# Patient Record
Sex: FEMALE | Race: WHITE
[De-identification: names, ages, dates, MRNs, and addresses within clinical notes are randomized per-mention and may not be internally consistent; named-entity substitution may affect disease eponyms.]

---

## 2017-03-30 ENCOUNTER — HOSPITAL ENCOUNTER (EMERGENCY)
Dept: HOSPITAL 10 - E/R | Age: 77
Discharge: HOME | End: 2017-03-30
Payer: MEDICARE

## 2017-03-30 VITALS
HEART RATE: 65 BPM | SYSTOLIC BLOOD PRESSURE: 222 MMHG | TEMPERATURE: 98.3 F | RESPIRATION RATE: 20 BRPM | DIASTOLIC BLOOD PRESSURE: 98 MMHG

## 2017-03-30 VITALS — HEIGHT: 55 IN | WEIGHT: 199.74 LBS | BODY MASS INDEX: 46.22 KG/M2

## 2017-03-30 DIAGNOSIS — R10.30: Primary | ICD-10-CM

## 2017-03-30 DIAGNOSIS — Z79.4: ICD-10-CM

## 2017-03-30 DIAGNOSIS — I10: ICD-10-CM

## 2017-03-30 DIAGNOSIS — E11.9: ICD-10-CM

## 2017-03-30 DIAGNOSIS — Z79.84: ICD-10-CM

## 2017-03-30 LAB
ADD SCAN DIFF: NO
ADD UMIC: NO
ALBUMIN SERPL-MCNC: 3.8 G/DL (ref 3.3–4.9)
ALBUMIN/GLOB SERPL: 1.11 {RATIO}
ALP SERPL-CCNC: 119 IU/L (ref 42–121)
ALT SERPL-CCNC: 23 IU/L (ref 13–69)
ANION GAP SERPL CALC-SCNC: 13 MMOL/L (ref 8–16)
AST SERPL-CCNC: 25 IU/L (ref 15–46)
BASOPHILS # BLD AUTO: 0 10^3/UL (ref 0–0.1)
BASOPHILS NFR BLD: 0.4 % (ref 0–2)
BILIRUB DIRECT SERPL-MCNC: 0 MG/DL (ref 0–0.2)
BILIRUB SERPL-MCNC: 0.2 MG/DL (ref 0.2–1.3)
BUN SERPL-MCNC: 16 MG/DL (ref 7–20)
CALCIUM SERPL-MCNC: 10.4 MG/DL (ref 8.4–10.2)
CHLORIDE SERPL-SCNC: 104 MMOL/L (ref 97–110)
CO2 SERPL-SCNC: 28 MMOL/L (ref 21–31)
COLOR UR: (no result)
CREAT SERPL-MCNC: 0.87 MG/DL (ref 0.44–1)
EOSINOPHIL # BLD: 0.2 10^3/UL (ref 0–0.5)
EOSINOPHIL NFR BLD: 2.8 % (ref 0–7)
ERYTHROCYTE [DISTWIDTH] IN BLOOD BY AUTOMATED COUNT: 14.3 % (ref 11.5–14.5)
GLOBULIN SER-MCNC: 3.4 G/DL (ref 1.3–3.2)
GLUCOSE SERPL-MCNC: 173 MG/DL (ref 70–220)
GLUCOSE UR STRIP-MCNC: NEGATIVE %
HCT VFR BLD CALC: 39.5 % (ref 37–47)
HGB BLD-MCNC: 13.2 G/DL (ref 12–16)
KETONES UR STRIP.AUTO-MCNC: NEGATIVE MG/DL
LYMPHOCYTES # BLD AUTO: 2.6 10^3/UL (ref 0.8–2.9)
LYMPHOCYTES NFR BLD AUTO: 33.7 % (ref 15–51)
MCH RBC QN AUTO: 31.3 PG (ref 29–33)
MCHC RBC AUTO-ENTMCNC: 33.4 G/DL (ref 32–37)
MCV RBC AUTO: 93.6 FL (ref 82–101)
MONOCYTES # BLD: 0.4 10^3/UL (ref 0.3–0.9)
MONOCYTES NFR BLD: 4.8 % (ref 0–11)
NEUTROPHILS # BLD: 4.5 10^3/UL (ref 1.6–7.5)
NEUTROPHILS NFR BLD AUTO: 57.8 % (ref 39–77)
NITRITE UR QL STRIP.AUTO: NEGATIVE
NRBC # BLD MANUAL: 0 10^3/UL (ref 0–0)
NRBC BLD QL: 0 /100WBC (ref 0–0)
PLATELET # BLD: 201 10^3/UL (ref 140–415)
PMV BLD AUTO: 10.2 FL (ref 7.4–10.4)
POTASSIUM SERPL-SCNC: 4.2 MMOL/L (ref 3.5–5.1)
PROT SERPL-MCNC: 7.2 G/DL (ref 6.1–8.1)
RBC # BLD AUTO: 4.22 10^6/UL (ref 4.2–5.4)
RBC # UR AUTO: NEGATIVE /UL
SODIUM SERPL-SCNC: 141 MMOL/L (ref 135–144)
URINE BILIRUBIN (DIP): NEGATIVE
URINE TOTAL PROTEIN (DIP): NEGATIVE
UROBILINOGEN UR STRIP-ACNC: (no result) (ref 0.1–1)
WBC # BLD AUTO: 7.7 10^3/UL (ref 4.8–10.8)
WBC # UR STRIP: NEGATIVE /UL

## 2017-03-30 PROCEDURE — 36415 COLL VENOUS BLD VENIPUNCTURE: CPT

## 2017-03-30 PROCEDURE — 80053 COMPREHEN METABOLIC PANEL: CPT

## 2017-03-30 PROCEDURE — 83690 ASSAY OF LIPASE: CPT

## 2017-03-30 PROCEDURE — 99284 EMERGENCY DEPT VISIT MOD MDM: CPT

## 2017-03-30 PROCEDURE — 81003 URINALYSIS AUTO W/O SCOPE: CPT

## 2017-03-30 PROCEDURE — 85025 COMPLETE CBC W/AUTO DIFF WBC: CPT

## 2017-03-30 PROCEDURE — 96374 THER/PROPH/DIAG INJ IV PUSH: CPT

## 2017-03-30 NOTE — ERD
ER Documentation


Chief Complaint


Date/Time


DATE: 3/30/17 


TIME: 10:59


Chief Complaint


LEFT FLANK PAIN LOWER ABD PAIN FOR A FEW DAYS. NO VOMITING. DYSURIA AT TIME





HPI


76-year-old female referred to the emergency department for abdominal pain that 

she has had for years now.  Her physician apparently checked a urine and was 

concerned that she might have a urinary tract infection and therefore referred 

the patient to the emergency department.  Patient reports the pain is 

nonspecific non-provoked and poorly localized.  Patient has no fevers, chills, 

vomiting, diarrhea.  Patient has no hematuria or dysuria but reports an itching 

inside her vagina for many years.





ROS


All systems reviewed and are negative except as per history of present illness.





Medications


Home Meds


Active Scripts


Fluconazole* (Diflucan*) 150 Mg Tablet, 150 MG PO ONCE, #1 TAB


   Prov:GIOVANI SOMMER         3/30/17


Reported Medications


Insulin Glargine* (Lantus*) 100 Unit/Ml Soln, 30 UNIT SC QPM Y for 0, #1 VIAL


   3/30/17


Insulin Glargine* (Lantus*) 100 Unit/Ml Soln, 45 UNIT SC QAM, #1 VIAL


   3/30/17


Zolpidem Tartrate* (Zolpidem Tartrate*) 5 Mg Tablet, 10 MG PO HS Y, TAB


   10/16/14


Ca Carbonate/Vitamin D3/Vit K (VIACTIV SOFT CHEW TABLET) 1 Each Tab.chew, 1 

EACH PO WITH MEALS, TAB.CHEW


   9/21/14


Esomeprazole Mag Trihydrate (Nexium) 40 Mg Capsule.dr, 40 MG PO DAILY, CAP


   9/21/14


Cholecalciferol (Vitamin D3) (VITAMIN D-3) 2,000 Unit Capsule, 2000 UNIT PO 

DAILY


   9/21/14


Amlodipine-Valsartan-HCTZ (Exforge HCT) -12.5 Mg Tab, 1 TAB PO DAILY, TAB


   9/21/14


Metoprolol Succinate* (Toprol XL*) 50 Mg Tab.er.24h, 50 MG PO DAILY, TAB


   9/21/14


Discontinued Reported Medications


Insulin Glargine* (Lantus*) 100 Unit/Ml Soln, 1 UNIT SC QHS, #1 VIAL


   9/11/16


Insulin Glargine* (Lantus*) 100 Unit/Ml Soln, 40 UNIT SC DAILY, #1 VIAL


   9/11/16


Hydrocodone Bit-Acetaminophen* (Norco*)  Mg Tablet, 1 TAB PO BID Y for 

PAIN, TAB


   9/21/14


Celecoxib* (Celebrex*) 200 Mg Capsule, 200 MG PO DAILY, CAP


   9/21/14


Gabapentin* (Gabapentin*) 300 Mg Capsule, 300 MG PO BID, CAP


   9/21/14


Sitagliptin* (Januvia*) 100 Mg Tablet, 100 MG PO DAILY, TAB


   9/21/14


Discontinued Scripts


Metoclopramide Hcl* (Metoclopramide Hcl*) 10 Mg Tablet, 10 MG PO Q6H Y for 

NAUSEA AND OR VOMITING, #20 TAB


   Prov:ESPERANZA LEMA MD         9/11/16


Sennosides* (Senna Lax*) 8.6 Mg Tablet, 1 TAB PO Q12H Y for CONSTIPATION, #10 

TAB


   Prov:ESPERANZA LEMA MD         9/11/16


Ciprofloxacin Hcl* (Ciprofloxacin Hcl*) 500 Mg Tablet, 500 MG PO BID for 10 Days

, TAB


   Prov:FRANCA ALVARADO DO         7/23/16


Tramadol HCl (Tramadol HCl) 50 Mg Tab, 50 MG PO Q6 Y for PAIN, #12 TAB


   Prov:DONALD HATHAWAY MD         6/28/15


Levofloxacin* (Levaquin*) 500 Mg Tab, 500 MG PO DAILY@06 for 3 Days


   Prov:DANTE VILLA NP         10/1/14





Allergies


Allergies:  


Coded Allergies:  


     No Known Allergies (Verified  Allergy, Mild, 6/28/15)





PMhx/Soc


History of Surgery:  No


Anesthesia Reaction:  No


Hx Neurological Disorder:  No


Hx Respiratory Disorders:  No


Hx Cardiac Disorders:  Yes (HTN )


Hx Psychiatric Problems:  No


Hx Miscellaneous Medical Probl:  Yes (DM, HIGH CHOLESTEROL )


Hx Alcohol Use:  No


Hx Substance Use:  No


Hx Tobacco Use:  No





FmHx


Noncontributory for chief complaint





Physical Exam


Vitals





Vital Signs








  Date Time  Temp Pulse Resp B/P Pulse Ox O2 Delivery O2 Flow Rate FiO2


 


3/30/17 09:27 98.8 67 20 184/77 97   








Physical Exam


GENERAL: The patient is well developed and appropriate for usual state of 

health in no apparent distress


HEENT: Pupils equal, round, and reactive to light.  EOMI. There is no scleral 

icterus.


NECK: C-spine is soft and supple, there is no meningismus.  There is no 

cervical lymphadenopathy.


LUNGS: Clear to auscultation bilaterally. There are no rales, wheezes or 

rhonchi.


HEART: Regular rate and rhythm, no murmurs, clicks, rubs or gallops.


ABDOMEN: Soft, non-tender, non-distended.  There are bowel sounds in all four 

quadrants. No rebound or guarding.


EXTREMITIES: There is no peripheral cyanosis or edema.  No focal swelling or 

erythema.


NEURO: The patient moves all four extremities with 5/5 strength.  Cranial 

nerves II - XII are intact. Normal gait. Alert and oriented


SKIN: There is no apparent rash or petechiae.


HEME/LYMPHATIC: There is no evidence of excessive bruising or lymphedema.


PSYCHIATRIC: The patient does not appear anxious or depressed.


Result Diagram:  


3/30/17 0950                                                                   

             3/30/17 0950





Results 24 hrs





 Laboratory Tests








Test


  3/30/17


09:30 3/30/17


09:50


 


Urine Color LT. YELLOW  


 


Urine Clarity CLEAR  


 


Urine pH 6.0  


 


Urine Specific Gravity 1.020  


 


Urine Ketones NEGATIVE  


 


Urine Nitrite NEGATIVE  


 


Urine Bilirubin NEGATIVE  


 


Urine Urobilinogen 0.2  E.U./dL  


 


Urine Leukocyte Esterase NEGATIVE  


 


Urine Hemoglobin NEGATIVE  


 


Urine Glucose NEGATIVE%  


 


Urine Total Protein NEGATIVE  


 


White Blood Count  7.710^3/ul 


 


Red Blood Count  4.2210^6/ul 


 


Hemoglobin  13.2g/dl 


 


Hematocrit  39.5% 


 


Mean Corpuscular Volume  93.6fl 


 


Mean Corpuscular Hemoglobin  31.3pg 


 


Mean Corpuscular Hemoglobin


Concent 


  33.4g/dl 


 


 


Red Cell Distribution Width  14.3% 


 


Platelet Count  77446^3/UL 


 


Mean Platelet Volume  10.2fl 


 


Neutrophils %  57.8% 


 


Lymphocytes %  33.7% 


 


Monocytes %  4.8% 


 


Eosinophils %  2.8% 


 


Basophils %  0.4% 


 


Nucleated Red Blood Cells %  0.0/100WBC 


 


Neutrophils #  4.510^3/ul 


 


Lymphocytes #  2.610^3/ul 


 


Monocytes #  0.410^3/ul 


 


Eosinophils #  0.210^3/ul 


 


Basophils #  0.010^3/ul 


 


Nucleated Red Blood Cells #  0.010^3/ul 


 


Sodium Level  141mmol/L 


 


Potassium Level  4.2mmol/L 


 


Chloride Level  104mmol/L 


 


Carbon Dioxide Level  28mmol/L 


 


Anion Gap  13 


 


Blood Urea Nitrogen  16mg/dl 


 


Creatinine  0.87mg/dl 


 


Glucose Level  173mg/dl 


 


Calcium Level  10.4mg/dl 


 


Total Bilirubin  0.2mg/dl 


 


Direct Bilirubin  0.00mg/dl 


 


Indirect Bilirubin  0.2mg/dl 


 


Aspartate Amino Transf


(AST/SGOT) 


  25IU/L 


 


 


Alanine Aminotransferase


(ALT/SGPT) 


  23IU/L 


 


 


Alkaline Phosphatase  119IU/L 


 


Total Protein  7.2g/dl 


 


Albumin  3.8g/dl 


 


Globulin  3.40g/dl 


 


Albumin/Globulin Ratio  1.11 


 


Lipase  121U/L 








 Current Medications








 Medications


  (Trade)  Dose


 Ordered  Sig/Ac


 Route


 PRN Reason  Start Time


 Stop Time Status Last Admin


Dose Admin


 


 Sodium Chloride  500 ml @ 


 500 mls/hr  Q1H STAT


 IV


   3/30/17 09:38


 3/30/17 10:37 DC 3/30/17 10:06


 


 


 Ceftriaxone Sodium


  (Rocephin)  50 ml @ 


 100 mls/hr  ONCE  STAT


 IVPB


   3/30/17 09:38


 3/30/17 10:07 DC 3/30/17 10:06


 











Procedures/MDM


Patient was taken to a room, seen and evaluated. Comfort measures were 

initiated. 





Diagnostic tests were ordered and reviewed.





REEVALUATION: Patient remained comfortable in the emergency room





MEDICAL DECISION MAKING: Patient presents with abdominal pain of uncertain 

etiology. Differential diagnosis considered includes appendicitis, 

diverticulitis, cholecystitis and other intra-abdominal medical and surgical 

concerns. I have reviewed the patients lab studies as well as multiple 

examinations of the abdomen. 


At this time, patient shows no evidence of high risk infection in her urine, 

her he would sugar is well controlled, she has no evidence of appendicitis or 

other high-risk issues.  I suspect the burning is likely a low-grade vaginitis 

which I will treat with fluconazole, but she is otherwise clinically well and 

appropriate for outpatient care.





Departure


Diagnosis:  


 Primary Impression:  


 Abdominal pain


Condition:  Stable


Patient Instructions:  Abdominal Pain


Referrals:  


JERRY GOMEZ (PCP)





Additional Instructions:  


See your doctor for follow-up as discussed.  Take a copy of your test results, 

if appropriate, to this follow-up visit.





See your doctor or return here if your symptoms do not improve as expected.





At any time, please return to the emergency department for any change or 

worsening in her symptoms.











GIOVANI SOMMER Mar 30, 2017 11:01

## 2017-05-21 ENCOUNTER — HOSPITAL ENCOUNTER (EMERGENCY)
Dept: HOSPITAL 10 - E/R | Age: 77
Discharge: HOME | End: 2017-05-21
Payer: MEDICARE

## 2017-05-21 VITALS
HEART RATE: 102 BPM | DIASTOLIC BLOOD PRESSURE: 76 MMHG | RESPIRATION RATE: 18 BRPM | TEMPERATURE: 98.7 F | SYSTOLIC BLOOD PRESSURE: 174 MMHG

## 2017-05-21 VITALS
WEIGHT: 242.51 LBS | HEIGHT: 62 IN | BODY MASS INDEX: 44.63 KG/M2 | WEIGHT: 242.51 LBS | HEIGHT: 62 IN | BODY MASS INDEX: 44.63 KG/M2

## 2017-05-21 DIAGNOSIS — Z79.4: ICD-10-CM

## 2017-05-21 DIAGNOSIS — E11.9: ICD-10-CM

## 2017-05-21 DIAGNOSIS — R10.84: Primary | ICD-10-CM

## 2017-05-21 DIAGNOSIS — I10: ICD-10-CM

## 2017-05-21 DIAGNOSIS — R11.11: ICD-10-CM

## 2017-05-21 DIAGNOSIS — R93.0: ICD-10-CM

## 2017-05-21 LAB
ADD SCAN DIFF: NO
ADD UMIC: NO
ALBUMIN SERPL-MCNC: 4.1 G/DL (ref 3.3–4.9)
ALBUMIN/GLOB SERPL: 1.02 {RATIO}
ALP SERPL-CCNC: 127 IU/L (ref 42–121)
ALT SERPL-CCNC: 38 IU/L (ref 13–69)
ANION GAP SERPL CALC-SCNC: 16 MMOL/L (ref 8–16)
APTT BLD: 33.4 SEC (ref 25–35)
AST SERPL-CCNC: 27 IU/L (ref 15–46)
BASOPHILS # BLD AUTO: 0 10^3/UL (ref 0–0.1)
BASOPHILS NFR BLD: 0.3 % (ref 0–2)
BILIRUB DIRECT SERPL-MCNC: 0 MG/DL (ref 0–0.2)
BILIRUB SERPL-MCNC: 0.1 MG/DL (ref 0.2–1.3)
BUN SERPL-MCNC: 17 MG/DL (ref 7–20)
CALCIUM SERPL-MCNC: 10.9 MG/DL (ref 8.4–10.2)
CHLORIDE SERPL-SCNC: 102 MMOL/L (ref 97–110)
CO2 SERPL-SCNC: 27 MMOL/L (ref 21–31)
COLOR UR: (no result)
CREAT SERPL-MCNC: 0.82 MG/DL (ref 0.44–1)
EOSINOPHIL # BLD: 0.2 10^3/UL (ref 0–0.5)
EOSINOPHIL NFR BLD: 1.8 % (ref 0–7)
ERYTHROCYTE [DISTWIDTH] IN BLOOD BY AUTOMATED COUNT: 13.6 % (ref 11.5–14.5)
GLOBULIN SER-MCNC: 4 G/DL (ref 1.3–3.2)
GLUCOSE SERPL-MCNC: 81 MG/DL (ref 70–220)
GLUCOSE UR STRIP-MCNC: NEGATIVE %
HCT VFR BLD CALC: 42.2 % (ref 37–47)
HGB BLD-MCNC: 13.9 G/DL (ref 12–16)
INR PPP: 0.91
KETONES UR STRIP.AUTO-MCNC: NEGATIVE MG/DL
LYMPHOCYTES # BLD AUTO: 2.6 10^3/UL (ref 0.8–2.9)
LYMPHOCYTES NFR BLD AUTO: 27.3 % (ref 15–51)
MCH RBC QN AUTO: 30.6 PG (ref 29–33)
MCHC RBC AUTO-ENTMCNC: 32.9 G/DL (ref 32–37)
MCV RBC AUTO: 93 FL (ref 82–101)
MONOCYTES # BLD: 0.5 10^3/UL (ref 0.3–0.9)
MONOCYTES NFR BLD: 4.9 % (ref 0–11)
NEUTROPHILS # BLD: 6.3 10^3/UL (ref 1.6–7.5)
NEUTROPHILS NFR BLD AUTO: 65.4 % (ref 39–77)
NITRITE UR QL STRIP.AUTO: NEGATIVE
NRBC # BLD MANUAL: 0 10^3/UL (ref 0–0)
NRBC BLD QL: 0 /100WBC (ref 0–0)
PLATELET # BLD: 232 10^3/UL (ref 140–415)
PMV BLD AUTO: 9.9 FL (ref 7.4–10.4)
POTASSIUM SERPL-SCNC: 3.8 MMOL/L (ref 3.5–5.1)
PROT SERPL-MCNC: 8.1 G/DL (ref 6.1–8.1)
PROTHROMBIN TIME: 12.3 SEC (ref 12.2–14.2)
PT RATIO: 1
RBC # BLD AUTO: 4.54 10^6/UL (ref 4.2–5.4)
RBC # UR AUTO: NEGATIVE /UL
SODIUM SERPL-SCNC: 141 MMOL/L (ref 135–144)
TROPONIN I SERPL-MCNC: < 0.012 NG/ML (ref 0–0.12)
URINE BILIRUBIN (DIP): NEGATIVE
URINE TOTAL PROTEIN (DIP): NEGATIVE
UROBILINOGEN UR STRIP-ACNC: (no result) (ref 0.1–1)
WBC # BLD AUTO: 9.6 10^3/UL (ref 4.8–10.8)
WBC # UR STRIP: NEGATIVE /UL

## 2017-05-21 PROCEDURE — 80053 COMPREHEN METABOLIC PANEL: CPT

## 2017-05-21 PROCEDURE — 84484 ASSAY OF TROPONIN QUANT: CPT

## 2017-05-21 PROCEDURE — 93005 ELECTROCARDIOGRAM TRACING: CPT

## 2017-05-21 PROCEDURE — 71010: CPT

## 2017-05-21 PROCEDURE — 36415 COLL VENOUS BLD VENIPUNCTURE: CPT

## 2017-05-21 PROCEDURE — 96376 TX/PRO/DX INJ SAME DRUG ADON: CPT

## 2017-05-21 PROCEDURE — 96375 TX/PRO/DX INJ NEW DRUG ADDON: CPT

## 2017-05-21 PROCEDURE — 70450 CT HEAD/BRAIN W/O DYE: CPT

## 2017-05-21 PROCEDURE — 81003 URINALYSIS AUTO W/O SCOPE: CPT

## 2017-05-21 PROCEDURE — 85025 COMPLETE CBC W/AUTO DIFF WBC: CPT

## 2017-05-21 PROCEDURE — 85730 THROMBOPLASTIN TIME PARTIAL: CPT

## 2017-05-21 PROCEDURE — 85610 PROTHROMBIN TIME: CPT

## 2017-05-21 PROCEDURE — 74176 CT ABD & PELVIS W/O CONTRAST: CPT

## 2017-05-21 PROCEDURE — 96374 THER/PROPH/DIAG INJ IV PUSH: CPT

## 2017-05-21 PROCEDURE — 83690 ASSAY OF LIPASE: CPT

## 2017-05-21 NOTE — RADRPT
PROCEDURE:   CT Brain without contrast. 

 

CLINICAL INDICATION:   Hypertension.  Nausea.  Vomiting.. 

 

TECHNIQUE:   A CT of the brain was performed on a multislice detector CT scanner utilizing axial sec
tions from the skull base through the vertex without contrast. Images were reviewed on a high-[facility]
[employer] PACS workstation.  Exam CTDlvol = 44 mGy and DLP = 630 mGy-cm.  One of the following 3 dose red
uction techniques were used: Automated exposure control; adjustment of the mA and/or kV according to
 patient size; or use of iterative reconstruction technique.

 

COMPARISON:   None available  

 

FINDINGS:

 

There are an old right occipital and left frontal subcortical infarct.  There is age appropriate rohan
tral and peripheral atrophy.  There is no midline shift.  There is a moderate degree of supratentori
al periventricular and subcortical white matter hypodensities.  There is no definite acute stroke.  
There is no mass lesion.  There is no intracranial hemorrhage or abnormal extra-axial fluid collecti
on.  Visualized paranasal sinuses are clear. 

 

IMPRESSION:

 

1.  No acute intracranial abnormality.

2.  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

3.  Nonspecific white matter changes most commonly seen with microvascular ischemic disease.

 

 

RPTAT:  HMVK

_____________________________________________ 

.Oumar Naranjo MD, MD           Date    Time 

Electronically viewed and signed by .Oumar Naranjo MD, MD on 05/21/2017 21:05 

 

D:  05/21/2017 21:05  T:  05/21/2017 21:05

.K/

## 2017-05-21 NOTE — RADRPT
PROCEDURE:   XR, Chest. 

 

CLINICAL INDICATION:   Cough/abdomen pain. 

 

TECHNIQUE:   AP chest 

 

COMPARISON:   Chest, 09/11/2016.

 

FINDINGS:

 

There is no acute infiltrate in the lungs. There is calcified atherosclerosis of the aortic arch. No
 pleural effusion.  The heart is not enlarged. 

 

IMPRESSION:

 

1.  Unremarkable chest x-ray.

2.  Calcified atherosclerosis of the aortic arch.

 

RPTAT: GG

_____________________________________________ 

.Sam Matos MD, MD           Date    Time 

Electronically viewed and signed by .Sam Matos MD, MD on 05/21/2017 17:17 

 

D:  05/21/2017 17:17  T:  05/21/2017 17:17

.Y/

## 2017-05-21 NOTE — ERA
ER Documentation


Chief Complaint


Date/Time


DATE: 5/21/17 


TIME: 17:53


Chief Complaint


vomiting today





HPI


76-year-old female history of chronic abdominal pain who presents with nausea 

and vomiting.  24 hours of nausea vomiting  that is nonbloody nonbilious.  She 

also describes mild diffuse cramping abdominal pain.  She states no diarrhea, 

she is having regular bowel movements.  No fevers or chills.  Abdominal pain 

feels somewhat similar to abdominal pain in the past.





ROS


All systems reviewed and are negative except as per history of present illness.





Medications


Home Meds


Active Scripts


Ondansetron (Ondansetron Odt) 4 Mg Tab.rapdis, 4 MG PO Q6H Y for NAUSEA AND/OR 

VOMITING, #30 TAB


   Prov:WOLFGANG BARROSO MD         5/21/17


Fluconazole* (Diflucan*) 150 Mg Tablet, 150 MG PO ONCE, #1 TAB


   Prov:GIOVANI SOMMER         3/30/17


Reported Medications


Exenatide Microspheres (Bydureon Pen) 2 Mg/0.65 Ml Pen.injctr, 2 MG SQ Q7D for 

ON Saturday., EACH


   5/21/17


Insulin Glargine* (Lantus*) 100 Unit/Ml Soln, 30 UNIT SC QPM Y for 0, #1 VIAL


   3/30/17


Insulin Glargine* (Lantus*) 100 Unit/Ml Soln, 45 UNIT SC QAM, #1 VIAL


   3/30/17


Zolpidem Tartrate* (Zolpidem Tartrate*) 5 Mg Tablet, 10 MG PO HS Y, TAB


   10/16/14


Ca Carbonate/Vitamin D3/Vit K (VIACTIV SOFT CHEW TABLET) 1 Each Tab.chew, 1 

EACH PO WITH MEALS, TAB.CHEW


   9/21/14


Esomeprazole Mag Trihydrate (Nexium) 40 Mg Capsule.dr, 40 MG PO DAILY, CAP


   9/21/14


Cholecalciferol (Vitamin D3) (VITAMIN D-3) 2,000 Unit Capsule, 2000 UNIT PO 

DAILY


   9/21/14


Amlodipine-Valsartan-HCTZ (Exforge HCT) -12.5 Mg Tab, 1 TAB PO DAILY, TAB


   9/21/14


Metoprolol Succinate* (Toprol XL*) 50 Mg Tab.er.24h, 50 MG PO DAILY, TAB


   9/21/14





Allergies


Allergies:  


Coded Allergies:  


     No Known Allergies (Verified  Allergy, Mild, 6/28/15)





PMhx/Soc


History of Surgery:  No


Anesthesia Reaction:  No


Hx Neurological Disorder:  No


Hx Respiratory Disorders:  No


Hx Cardiac Disorders:  Yes (HTN )


Hx Psychiatric Problems:  No


Hx Miscellaneous Medical Probl:  Yes (DM, HIGH CHOLESTEROL )


Hx Alcohol Use:  No


Hx Substance Use:  No


Hx Tobacco Use:  No


Smoking Status:  Never smoker





FmHx


Family History:  No diabetes





Physical Exam


Vitals





Vital Signs








  Date Time  Temp Pulse Resp B/P Pulse Ox O2 Delivery O2 Flow Rate FiO2


 


5/21/17 16:08 99.4 100 18 246/111 99   








Physical Exam


General: Well developed, well nourished, no acute distress


Head: Normocephalic, atraumatic


Eyes: Pupils equally reactive, EOM intact


ENT: Moist mucous membranes


Neck: Supple, no lymphadenopathy


Respiratory: Lungs clear bilaterally, no distress


Cardiovascular: RRR, no murmurs, rubs, or gallops


Abdominal: Soft, mild diffuse tenderness without rebound or guarding


: Deferred


MSK: No edema, no unilateral swelling, 5/5 strength


Neurologic: Alert and oriented, moving all extremities, normal speech, no focal 

weakness, no cerebellar signs


Skin: No rash


Psych: Normal mood


Result Diagram:  


5/21/17 1736                                                                   

             5/21/17 1736





Results 24 hrs





 Laboratory Tests








Test


  5/21/17


17:36 5/21/17


17:56


 


White Blood Count 9.610^3/ul  


 


Red Blood Count 4.5410^6/ul  


 


Hemoglobin 13.9g/dl  


 


Hematocrit 42.2%  


 


Mean Corpuscular Volume 93.0fl  


 


Mean Corpuscular Hemoglobin 30.6pg  


 


Mean Corpuscular Hemoglobin


Concent 32.9g/dl 


  


 


 


Red Cell Distribution Width 13.6%  


 


Platelet Count 33569^3/UL  


 


Mean Platelet Volume 9.9fl  


 


Neutrophils % 65.4%  


 


Lymphocytes % 27.3%  


 


Monocytes % 4.9%  


 


Eosinophils % 1.8%  


 


Basophils % 0.3%  


 


Nucleated Red Blood Cells % 0.0/100WBC  


 


Neutrophils # 6.310^3/ul  


 


Lymphocytes # 2.610^3/ul  


 


Monocytes # 0.510^3/ul  


 


Eosinophils # 0.210^3/ul  


 


Basophils # 0.010^3/ul  


 


Nucleated Red Blood Cells # 0.010^3/ul  


 


Prothrombin Time 12.3Sec  


 


Prothrombin Time Ratio 1.0  


 


INR International Normalized


Ratio 0.91 


  


 


 


Activated Partial


Thromboplast Time 33.4Sec 


  


 


 


Sodium Level 141mmol/L  


 


Potassium Level 3.8mmol/L  


 


Chloride Level 102mmol/L  


 


Carbon Dioxide Level 27mmol/L  


 


Anion Gap 16  


 


Blood Urea Nitrogen 17mg/dl  


 


Creatinine 0.82mg/dl  


 


Glucose Level 81mg/dl  


 


Calcium Level 10.9mg/dl  


 


Total Bilirubin 0.1mg/dl  


 


Direct Bilirubin 0.00mg/dl  


 


Indirect Bilirubin 0.1mg/dl  


 


Aspartate Amino Transf


(AST/SGOT) 27IU/L 


  


 


 


Alanine Aminotransferase


(ALT/SGPT) 38IU/L 


  


 


 


Alkaline Phosphatase 127IU/L  


 


Troponin I < 0.012ng/ml  


 


Total Protein 8.1g/dl  


 


Albumin 4.1g/dl  


 


Globulin 4.00g/dl  


 


Albumin/Globulin Ratio 1.02  


 


Lipase 59U/L  


 


Urine Color  LT. YELLOW 


 


Urine Clarity  CLEAR 


 


Urine pH  7.0 


 


Urine Specific Gravity  <=1.005 


 


Urine Ketones  NEGATIVE 


 


Urine Nitrite  NEGATIVE 


 


Urine Bilirubin  NEGATIVE 


 


Urine Urobilinogen  0.2  E.U./dL 


 


Urine Leukocyte Esterase  NEGATIVE 


 


Urine Hemoglobin  NEGATIVE 


 


Urine Glucose  NEGATIVE% 


 


Urine Total Protein  NEGATIVE 








 Current Medications








 Medications


  (Trade)  Dose


 Ordered  Sig/Ac


 Route


 PRN Reason  Start Time


 Stop Time Status Last Admin


Dose Admin


 


 Sodium Chloride


  (NS)  1,000 ml @ 


 1,000 mls/hr  Q1H STAT


 IV


   5/21/17 16:30


 5/21/17 17:29 DC 5/21/17 17:49


 


 


 Morphine Sulfate


  (morphine)  4 mg  ONCE  STAT


 IV


   5/21/17 16:30


 5/21/17 16:31 DC 5/21/17 17:49


 


 


 Ondansetron HCl


  (Zofran Inj)  4 mg  ONCE  STAT


 IV


   5/21/17 16:30


 5/21/17 16:31 DC 5/21/17 17:49


 











Procedures/MDM


EKG, MONITORS, & DIAGNOSTIC IMAGING:


Chest x-ray: I reviewed and interpreted a 1 view of the chest


Mediastinum: No enlargement


Cardiac silhouette: No cardiomegaly


Airspace: Clear lung fields bilaterally without evidence of pneumothorax


Bones: No evidence of fracture





EKG: I reviewed and interpreted a 12-lead EKG. 


Rhythm: Normal sinus rhythm


Ectopy: None


Intervals: No abnormalities


ST segments: No elevations or depressions


T waves: No contiguous inversions








CT abdomen and pelvis:


IMPRESSION:


1.  Mild atelectasis at the lung bases posteriorly.


2.  Fatty metamorphosis of the liver.


3.  Atherosclerosis.


4.  Normal appendix.


5.  Diverticulosis of the colon without evidence of diverticulitis.


6.  Degenerative changes of the spine.


7.  Bilateral pars defects at L5 with grade 1 anterolisthesis at L5-S1.


8.  Otherwise unremarkable noncontrast CT scan of the abdomen and pelvis.


9.  No significant change from 09/11/2016.  


 


RPTAT: QQ





LAB INTERPRETATION:


No leukocytosis, no hepatobiliary obstruction, no urinary tract infection, 

negative troponin





MEDICAL DECISION MAKING:


The patient does have a long history of abdominal pain, she has multiple visits 

to the emergency room including multiple CT scans.  However, given the patient 

says she is at risk for misdiagnosis and delayed diagnosis or atypical 

presentation.  For this reason a repeat CT scan was ordered.  Consider possible 

viral process versus bowel obstruction among others.





ER COURSE:


The patient's laboratory testing and diagnostic imaging are unrevealing.  

Reviewing the patient's electronic medical record she has multiple visits for 

abdominal pain in the past.  The patient is scheduled to follow-up with 

gastroenterology Dr. Mullins next month.  It is not clear if the patient has 

had a colonoscopy or endoscopy.  I strongly recommend this.  At this time the 

patient tolerated p.o. intake her symptoms are improved and repeat abdominal 

exam is benign.  The patient is safe for discharge.





I kept the patient and/or family informed of laboratory and diagnostic imaging 

results throughout the emergency room course.





DISPOSITION PLAN:


We discussed follow up with the patient's primary care doctor within 24 to 48 

hours as needed.  We also discussed return to the emergency room for worsening 

symptoms or worsening condition.





Outpatient referral: Gastroenterology





Discharge Medications:


Zofran





Departure


Diagnosis:  


 Primary Impression:  


 Abdominal pain


 Qualified Code:  R10.84 - Generalized abdominal pain


 Additional Impression:  


 Vomiting


 Qualified Code:  R11.11 - Non-intractable vomiting without nausea, unspecified 

vomiting type


Condition:  Stable











WOLFGANG BARROSO MD May 21, 2017 17:55

## 2017-05-21 NOTE — RADRPT
PROCEDURE:   CT Abdomen and Pelvis without contrast. 

 

CLINICAL INDICATION:   Abdominal and pelvic pain.  

 

TECHNIQUE:   CT scan of the abdomen and pelvis without contrast was performed. Coronal and sagittal 
reformatted images were obtained from the axial source images. Images were reviewed on a [employer] PACS workstation. Total exam DLP is 1080.65 mGy-cm.  CTDIvol is 21.14 mGy.  One or more of the 
following dose reduction techniques were used: Automated exposure control, adjustment of the mA and/
or kV according to patient size, use of iterative reconstruction technique.

 

COMPARISON:   CT scan of the abdomen and pelvis dated 09/11/2016.  

 

FINDINGS:

There is mild atelectasis at the lung bases posteriorly.  The lung bases are otherwise normal.  Ther
e is no pleural effusion or pericardial effusion.  The heart size is normal.  

The liver is normal in size and diffusely decreased in attenuation.  There is no focal hepatic lesio
n. 

The gallbladder and bile ducts are normal. 

The spleen is normal in size.  There is no focal splenic lesion. 

Both adrenals are normal with no enlargement or mass. 

The pancreas is unremarkable with no mass or evidence of pancreatitis. 

There is no renal mass or hydronephrosis.  There is no renal calculus or ureteral calculus. 

The abdominal aorta is not dilated.  There is calcification in the wall of the aorta consistent with
 atherosclerosis. 

There is no retroperitoneal lymphadenopathy or mass. 

There is no pelvic lymphadenopathy or mass. 

The bladder and distal ureters are normal. 

The appendix is well seen and appears normal. 

There is diverticulosis of the colon without evidence of diverticulitis.  The bowel and mesentery ar
e otherwise normal. 

There is no free fluid or free gas. 

There are degenerative changes of the spine.  There is no fracture or lytic lesion.  There are bilat
eral pars defects at L5 with grade 1 anterolisthesis at L5-S1.  

 

IMPRESSION:

1.  Mild atelectasis at the lung bases posteriorly.

2.  Fatty metamorphosis of the liver.

3.  Atherosclerosis.

4.  Normal appendix.

5.  Diverticulosis of the colon without evidence of diverticulitis.

6.  Degenerative changes of the spine.

7.  Bilateral pars defects at L5 with grade 1 anterolisthesis at L5-S1.

8.  Otherwise unremarkable noncontrast CT scan of the abdomen and pelvis.

9.  No significant change from 09/11/2016.  

 

RPTAT: QQ

_____________________________________________ 

.Miguel Granados MD, MD           Date    Time 

Electronically viewed and signed by .Miguel Granados MD, MD on 05/21/2017 18:46 

 

D:  05/21/2017 18:46  T:  05/21/2017 18:46

.R/

## 2017-07-16 ENCOUNTER — HOSPITAL ENCOUNTER (EMERGENCY)
Dept: HOSPITAL 10 - E/R | Age: 77
Discharge: HOME | End: 2017-07-16
Payer: MEDICARE

## 2017-07-16 VITALS — RESPIRATION RATE: 18 BRPM | DIASTOLIC BLOOD PRESSURE: 68 MMHG | SYSTOLIC BLOOD PRESSURE: 154 MMHG | HEART RATE: 71 BPM

## 2017-07-16 VITALS
WEIGHT: 197.31 LBS | HEIGHT: 62 IN | HEIGHT: 62 IN | BODY MASS INDEX: 36.31 KG/M2 | WEIGHT: 197.31 LBS | BODY MASS INDEX: 36.31 KG/M2

## 2017-07-16 VITALS — TEMPERATURE: 98.2 F

## 2017-07-16 DIAGNOSIS — E11.9: ICD-10-CM

## 2017-07-16 DIAGNOSIS — Z79.4: ICD-10-CM

## 2017-07-16 DIAGNOSIS — I10: ICD-10-CM

## 2017-07-16 DIAGNOSIS — R10.32: Primary | ICD-10-CM

## 2017-07-16 LAB
ADD SCAN DIFF: NO
ADD UMIC: NO
ALBUMIN SERPL-MCNC: 4.7 G/DL (ref 3.3–4.9)
ALBUMIN/GLOB SERPL: 1.3 {RATIO}
ALP SERPL-CCNC: 142 IU/L (ref 42–121)
ALT SERPL-CCNC: 28 IU/L (ref 13–69)
AMYLASE SERPL-CCNC: 52 U/L (ref 11–123)
ANION GAP SERPL CALC-SCNC: 21 MMOL/L (ref 8–16)
APTT BLD: 32.6 SEC (ref 25–35)
AST SERPL-CCNC: 25 IU/L (ref 15–46)
BASOPHILS # BLD AUTO: 0 10^3/UL (ref 0–0.1)
BASOPHILS NFR BLD: 0.3 % (ref 0–2)
BILIRUB DIRECT SERPL-MCNC: 0 MG/DL (ref 0–0.2)
BILIRUB SERPL-MCNC: 0.1 MG/DL (ref 0.2–1.3)
BUN SERPL-MCNC: 19 MG/DL (ref 7–20)
CALCIUM SERPL-MCNC: 11 MG/DL (ref 8.4–10.2)
CHLORIDE SERPL-SCNC: 97 MMOL/L (ref 97–110)
CO2 SERPL-SCNC: 27 MMOL/L (ref 21–31)
COLOR UR: (no result)
CREAT SERPL-MCNC: 0.8 MG/DL (ref 0.44–1)
EOSINOPHIL # BLD: 0.1 10^3/UL (ref 0–0.5)
EOSINOPHIL NFR BLD: 0.9 % (ref 0–7)
ERYTHROCYTE [DISTWIDTH] IN BLOOD BY AUTOMATED COUNT: 13.6 % (ref 11.5–14.5)
GLOBULIN SER-MCNC: 3.6 G/DL (ref 1.3–3.2)
GLUCOSE SERPL-MCNC: 163 MG/DL (ref 70–220)
GLUCOSE UR STRIP-MCNC: NEGATIVE MG/DL
HCT VFR BLD CALC: 42 % (ref 37–47)
HGB BLD-MCNC: 14.1 G/DL (ref 12–16)
INR PPP: 0.88
KETONES UR STRIP.AUTO-MCNC: NEGATIVE MG/DL
LYMPHOCYTES # BLD AUTO: 2.7 10^3/UL (ref 0.8–2.9)
LYMPHOCYTES NFR BLD AUTO: 25.7 % (ref 15–51)
MCH RBC QN AUTO: 30.5 PG (ref 29–33)
MCHC RBC AUTO-ENTMCNC: 33.6 G/DL (ref 32–37)
MCV RBC AUTO: 90.7 FL (ref 82–101)
MONOCYTES # BLD: 0.4 10^3/UL (ref 0.3–0.9)
MONOCYTES NFR BLD: 4.1 % (ref 0–11)
NEUTROPHILS # BLD: 7.1 10^3/UL (ref 1.6–7.5)
NEUTROPHILS NFR BLD AUTO: 68.5 % (ref 39–77)
NITRITE UR QL STRIP.AUTO: NEGATIVE MG/DL
NRBC # BLD MANUAL: 0 10^3/UL (ref 0–0)
NRBC BLD QL: 0 /100WBC (ref 0–0)
PLATELET # BLD: 248 10^3/UL (ref 140–415)
PMV BLD AUTO: 9.8 FL (ref 7.4–10.4)
POTASSIUM SERPL-SCNC: 4.2 MMOL/L (ref 3.5–5.1)
PROT SERPL-MCNC: 8.3 G/DL (ref 6.1–8.1)
PROTHROMBIN TIME: 11.9 SEC (ref 12.2–14.2)
PT RATIO: 0.9
RBC # BLD AUTO: 4.63 10^6/UL (ref 4.2–5.4)
RBC # UR AUTO: NEGATIVE MG/DL
SODIUM SERPL-SCNC: 141 MMOL/L (ref 135–144)
TROPONIN I SERPL-MCNC: < 0.012 NG/ML (ref 0–0.12)
UR ASCORBIC ACID: NEGATIVE MG/DL
UR BILIRUBIN (DIP): NEGATIVE MG/DL
UR CLARITY: CLEAR
UR PH (DIP): 7 (ref 5–9)
UR SPECIFIC GRAVITY (DIP): 1.02 (ref 1–1.03)
UR TOTAL PROTEIN (DIP): NEGATIVE MG/DL
UROBILINOGEN UR STRIP-ACNC: NEGATIVE MG/DL
WBC # BLD AUTO: 10.4 10^3/UL (ref 4.8–10.8)
WBC # UR STRIP: NEGATIVE LEU/UL

## 2017-07-16 PROCEDURE — 87040 BLOOD CULTURE FOR BACTERIA: CPT

## 2017-07-16 PROCEDURE — 81003 URINALYSIS AUTO W/O SCOPE: CPT

## 2017-07-16 PROCEDURE — 83690 ASSAY OF LIPASE: CPT

## 2017-07-16 PROCEDURE — 93005 ELECTROCARDIOGRAM TRACING: CPT

## 2017-07-16 PROCEDURE — 87086 URINE CULTURE/COLONY COUNT: CPT

## 2017-07-16 PROCEDURE — 82150 ASSAY OF AMYLASE: CPT

## 2017-07-16 PROCEDURE — 85730 THROMBOPLASTIN TIME PARTIAL: CPT

## 2017-07-16 PROCEDURE — 85025 COMPLETE CBC W/AUTO DIFF WBC: CPT

## 2017-07-16 PROCEDURE — 71010: CPT

## 2017-07-16 PROCEDURE — 74177 CT ABD & PELVIS W/CONTRAST: CPT

## 2017-07-16 PROCEDURE — 80053 COMPREHEN METABOLIC PANEL: CPT

## 2017-07-16 PROCEDURE — 84484 ASSAY OF TROPONIN QUANT: CPT

## 2017-07-16 PROCEDURE — 85610 PROTHROMBIN TIME: CPT

## 2017-07-16 PROCEDURE — 96375 TX/PRO/DX INJ NEW DRUG ADDON: CPT

## 2017-07-16 PROCEDURE — 96374 THER/PROPH/DIAG INJ IV PUSH: CPT

## 2017-07-16 PROCEDURE — 83605 ASSAY OF LACTIC ACID: CPT

## 2017-07-16 NOTE — ERD
ER Documentation


Chief Complaint


Date/Time


DATE: 7/16/17 


TIME: 16:04


Chief Complaint


Pt here for abdominal pain x1 day with constipation and N/V





HPI


This is a very pleasant 76-year-old Norwegian-speaking female presents to the 

emergency department brought in by her daughter.  The patient has a known 

history of insulin-dependent diabetes mellitus and hypertension.  She indicates 

that over the past year she has had recurrent abdominal pain however over the 

past 3 days she states she has had worsening of her left lower quadrant pain 

that has radiated to the suprapubic region.  She states the pain is 8 out of 10 

in intensity.  She states there is no alleviating or exacerbating factors.  She 

has had no past surgical history.  She stated that this morning just prior to 

arrival the pain increased to 10 out of 10 intensity which prompted her to come 

to the emergency department to be further evaluated.  She has not had a bowel 

movement in the past 24 hours but also indicates she has not consumed any oral 

intake for roughly 1 day due to the pain.  She is not had any diarrhea or 

recent hospitalizations.  She does have frequency urgency and dysuria with 

recurrent urinary tract infections.  She did not take any analgesic medication 

or antipyretics prior to arrival.  She denies any chest pain or pressure that 

radiates to the neck arm back or jaw.  She has no shortness of breath at rest 

or exertion.  She denies any back pain.  She did have 4 episodes of nonbloody 

nonbilious emesis in the past 24 hours.





ROS


All systems reviewed and are negative except as per history of present illness.





Medications


Home Meds


Active Scripts


Sucralfate* (Carafate*) 1 Gm Tab, 1 GM PO AC MEALS AND BEDTIME, #30 TAB


   Prov:ANGELINA RODRIGUEZ         7/16/17


Hydrocodone/Acetaminophen (Norco 5-325 Tablet) 1 Each Tablet, 1 EACH PO Q6, #20 

TAB


   Prov:ANGELINA RODRIGUEZ         7/16/17


Ondansetron (Ondansetron Odt) 4 Mg Tab.rapdis, 4 MG PO Q6H Y for NAUSEA AND/OR 

VOMITING, #30 TAB


   Prov:WOLFGANG BARROSO MD         5/21/17


Fluconazole* (Diflucan*) 150 Mg Tablet, 150 MG PO ONCE, #1 TAB


   Prov:GIOVANI SOMMER         3/30/17


Reported Medications


Exenatide Microspheres (Bydureon Pen) 2 Mg/0.65 Ml Pen.injctr, 2 MG SQ Q7D for 

ON Saturday., EACH


   5/21/17


Insulin Glargine* (Lantus*) 100 Unit/Ml Soln, 30 UNIT SC QPM Y for 0, #1 VIAL


   3/30/17


Insulin Glargine* (Lantus*) 100 Unit/Ml Soln, 45 UNIT SC QAM, #1 VIAL


   3/30/17


Zolpidem Tartrate* (Zolpidem Tartrate*) 5 Mg Tablet, 10 MG PO HS Y, TAB


   10/16/14


Ca Carbonate/Vitamin D3/Vit K (VIACTIV SOFT CHEW TABLET) 1 Each Tab.chew, 1 

EACH PO WITH MEALS, TAB.CHEW


   9/21/14


Esomeprazole Mag Trihydrate (Nexium) 40 Mg Capsule.dr, 40 MG PO DAILY, CAP


   9/21/14


Cholecalciferol (Vitamin D3) (VITAMIN D-3) 2,000 Unit Capsule, 2000 UNIT PO 

DAILY


   9/21/14


Amlodipine-Valsartan-HCTZ (Exforge HCT) -12.5 Mg Tab, 1 TAB PO DAILY, TAB


   9/21/14


Metoprolol Succinate* (Toprol XL*) 50 Mg Tab.er.24h, 50 MG PO DAILY, TAB


   9/21/14





Allergies


Allergies:  


Coded Allergies:  


     No Known Allergies (Verified  Allergy, Mild, 6/28/15)





PMhx/Soc


History of Surgery:  No


Anesthesia Reaction:  No


Hx Neurological Disorder:  No


Hx Respiratory Disorders:  No


Hx Cardiac Disorders:  Yes (HTN )


Hx Psychiatric Problems:  No


Hx Miscellaneous Medical Probl:  Yes (DM, HIGH CHOLESTEROL )


Hx Alcohol Use:  No


Hx Substance Use:  No


Hx Tobacco Use:  No





Physical Exam


Vitals





Vital Signs








  Date Time  Temp Pulse Resp B/P Pulse Ox O2 Delivery O2 Flow Rate FiO2


 


7/16/17 18:40 98.2 87 16 159/73 96 Room Air  


 


7/16/17 16:30 98.3 91 20 168/72 97 Room Air  


 


7/16/17 14:23 97.3 107 24 183/86 97   








Physical Exam


Constitutional:Well-developed. Well-nourished.


HEENT:Normocephalic. Atraumatic.Pupils were equal round reactive to light. 

Moist mucous membranes.No tonsillar exudates.


Neck: No nuchal rigidity. No lymphadenopathy. No posterior cervical spine 

tenderness or step-offs.


Respiratory: Not using accessory muscles of respiration.Lungs were clear to 

auscultation bilaterally. No rhonchi. No rales. No wheezing. 


Cardiovascular: Regular rate regular rhythm.No murmurs. No rubs were 

appreciated.S1, S2 normal. Distal pulses are palpable 2+ bilaterally.


GI: Abdomen was soft.  Left lower quadrant tenderness.  Non Distended. No 

pulsatile abdominal masses or bruits. No rebound. No guarding. Bowel sounds 

were present and normal. 


Muscle skeletal: Full range of motion of both the upper and lower extremities 

bilaterally.Normal muscle tone.No assymetrical calf tenderness or swelling. 


Skin: No petechia, no purpura. No lesions on the palms or the soles of the 

feet. No maculopapular rash.


NEURO: Patient was alert, awake, orientated x3.No facial droop. Gait observed 

and normal with no ataxia.Speech had regular rate and rhythm. No focal 

neurological deficits.


Result Diagram:  


7/16/17 1610                                                                   

             7/16/17 1610





Results 24 hrs





 Laboratory Tests








Test


  7/16/17


16:10 7/16/17


18:04 7/16/17


18:10


 


White Blood Count 10.410^3/ul   


 


Red Blood Count 4.6310^6/ul   


 


Hemoglobin 14.1g/dl   


 


Hematocrit 42.0%   


 


Mean Corpuscular Volume 90.7fl   


 


Mean Corpuscular Hemoglobin 30.5pg   


 


Mean Corpuscular Hemoglobin


Concent 33.6g/dl 


  


  


 


 


Red Cell Distribution Width 13.6%   


 


Platelet Count 86870^3/UL   


 


Mean Platelet Volume 9.8fl   


 


Neutrophils % 68.5%   


 


Lymphocytes % 25.7%   


 


Monocytes % 4.1%   


 


Eosinophils % 0.9%   


 


Basophils % 0.3%   


 


Nucleated Red Blood Cells % 0.0/100WBC   


 


Neutrophils # 7.110^3/ul   


 


Lymphocytes # 2.710^3/ul   


 


Monocytes # 0.410^3/ul   


 


Eosinophils # 0.110^3/ul   


 


Basophils # 0.010^3/ul   


 


Nucleated Red Blood Cells # 0.010^3/ul   


 


Prothrombin Time 11.9Sec   


 


Prothrombin Time Ratio 0.9   


 


INR International Normalized


Ratio 0.88 


  


  


 


 


Activated Partial


Thromboplast Time 32.6Sec 


  


  


 


 


Sodium Level 141mmol/L   


 


Potassium Level 4.2mmol/L   


 


Chloride Level 97mmol/L   


 


Carbon Dioxide Level 27mmol/L   


 


Anion Gap 21   


 


Blood Urea Nitrogen 19mg/dl   


 


Creatinine 0.80mg/dl   


 


Glucose Level 163mg/dl   


 


Lactic Acid Level 1.5mmol/L   


 


Calcium Level 11.0mg/dl   


 


Total Bilirubin 0.1mg/dl   


 


Direct Bilirubin 0.00mg/dl   


 


Indirect Bilirubin 0.1mg/dl   


 


Aspartate Amino Transf


(AST/SGOT) 25IU/L 


  


  


 


 


Alanine Aminotransferase


(ALT/SGPT) 28IU/L 


  


  


 


 


Alkaline Phosphatase 142IU/L   


 


Troponin I < 0.012ng/ml   


 


Total Protein 8.3g/dl   


 


Albumin 4.7g/dl   


 


Globulin 3.60g/dl   


 


Albumin/Globulin Ratio 1.30   


 


Amylase Level 52U/L   


 


Lipase 125U/L   


 


Urine Color  STRAW  


 


Urine Clarity  CLEAR  


 


Urine pH  7.0  


 


Urine Specific Gravity  1.020  


 


Urine Ketones  NEGATIVEmg/dL  


 


Urine Nitrite  NEGATIVEmg/dL  


 


Urine Bilirubin  NEGATIVEmg/dL  


 


Urine Urobilinogen  NEGATIVEmg/dL  


 


Urine Leukocyte Esterase  NEGATIVELeu/ul  


 


Urine Hemoglobin  NEGATIVEmg/dL  


 


Urine Glucose  NEGATIVEmg/dL  


 


Urine Total Protein  NEGATIVEmg/dl  


 


Bedside Urine pH (LAB)   7.0 


 


Bedside Urine Protein (LAB)   Trace 


 


Bedside Urine Glucose (UA)   Negative 


 


Bedside Urine Ketones (LAB)   Negative 


 


Bedside Urine Blood   Negative 


 


Bedside Urine Nitrite (LAB)   Negative 


 


Bedside Urine Leukocyte


Esterase (L 


  


  Negative 


 








 Current Medications








 Medications


  (Trade)  Dose


 Ordered  Sig/Ac


 Route


 PRN Reason  Start Time


 Stop Time Status Last Admin


Dose Admin


 


 Sodium Chloride


  (NS)  2,770 ml  BOLUS OVER 2 HOURS STAT


 IV*


   7/16/17 15:48


 7/16/17 15:51 DC 7/16/17 16:21


 


 


 IV Flush 10 ml  10 ml  STK-MED ONCE


 .ROUTE


   7/16/17 17:21


 7/16/17 17:22 DC 7/16/17 17:33


 


 


 Sodium Chloride


  (NS)  100 ml @ ud  STK-MED ONCE


 .ROUTE


   7/16/17 17:21


 7/16/17 17:22 DC 7/16/17 17:34


 


 


 Iodixanol


  (Visipaque Locm)  100 ml  STK-MED ONCE


 .ROUTE


   7/16/17 17:21


 7/16/17 17:22 DC 7/16/17 17:34


 


 


 Ketorolac


 Tromethamine


  (Toradol)  30 mg  ONCE  STAT


 IV


   7/16/17 18:24


 7/16/17 18:26 DC 7/16/17 18:38


 


 


 Hydromorphone HCl


  (Dilaudid)  1 mg  ONCE  STAT


 IV


   7/16/17 19:24


 7/16/17 19:25 DC 7/16/17 19:32


 


 


 Ondansetron HCl


  (Zofran Inj)  4 mg  ONCE  STAT


 IV


   7/16/17 19:24


 7/16/17 19:25 DC 7/16/17 19:32


 











Procedures/MDM


This patient presented to the emergency department with abdominal pain and was 

seen and evaluated by myself. My differential diagnosis included but was not 

limited to abdominal aortic aneurysm, appendicitis, pancreatitis, perforated 

peptic ulcer, perforated viscus, Boerhaave's syndrome or visceral pain such as 

diverticulitis, DKA, esophagitis, hepatitis or bowel obstruction.





The patient was placed on a cardiac monitor, continuous pulse oximetry, and IV 

access was established by nursing staff.  Patient did meet SIRS criteria and 

therefore was given a 30 cc/kg bolus of normal saline.  She was afebrile.  She 

was given intravenous morphine and Zofran for analgesic control.





I did obtain blood cultures and urine cultures.  The patient did not have a 

urinary tract infection.  Also obtained a CT scan of the patient's abdomen and 

pelvis which showed no acute pathology.  This was reviewed by myself and the 

radiologist.  There is no evidence of appendicitis, small bowel obstruction, 

masses or perforated viscus.  Given that this pain has been present for over 2 

years I did indicate that the patient will benefit from an outpatient upper 

endoscopy and colonoscopy.  The patient received analgesic medication with 

opiates and her pain had completely resolved.  Lactic acid was normal and there 

is no evidence of sepsis.





Observation Note:


Time:   6 hours


Family Hx:   No Hypertension


Evaluation:   Multiple exams showed improving symptoms and no evidence of 

peritoneal signs to suggest a surgical abdomen.





The patient was discharged home in fair condition. They were instructed to 

return to the emergency department at any time if there was any worsening of 

their condition. The patient stated they would follow up with their PCP in the 

next 24-48 hours to initiate a suitable medication regimen under the care of 

their PCP as well as to allow their PCP to monitor any drug reactions. The 

patient was discharged home with prescriptions after they gave informed consent 

to the new medication.  They were also fully informed by myself on the adverse 

effects and adverse drug interactions in order to provide adequate safeguards 

to prevent possible adverse reactions to medications.





Departure


Diagnosis:  


 Primary Impression:  


 Abdominal pain


 Abdominal location:  left lower quadrant  Qualified Code:  R10.32 - Left lower 

quadrant pain


Condition:  ANGELINA Kaur Jul 16, 2017 16:07

## 2017-07-16 NOTE — RADRPT
PROCEDURE:   XR Chest.

 

CLINICAL INDICATION:   Sepsis

 

TECHNIQUE:   Single frontal chest x-ray. 

 

COMPARISON:   05/21/2017

 

FINDINGS:

The lungs are clear of acute infiltrates, edema, effusions, or masses. Calcific atherosclerosis of t
he aorta is present..  The cardiomediastinal silhouette is unremarkable. The osseous structures are 
intact.   

 

IMPRESSION:

No acute cardiopulmonary disease.   

 

RPTAT: HJPL

_____________________________________________ 

.Robert Ferrari MD, MD           Date    Time 

Electronically viewed and signed by .Robert Ferrari MD, MD on 07/16/2017 16:40 

 

D:  07/16/2017 16:40  T:  07/16/2017 16:40

.L/

## 2017-07-23 ENCOUNTER — HOSPITAL ENCOUNTER (INPATIENT)
Dept: HOSPITAL 10 - E/R | Age: 77
LOS: 7 days | Discharge: HOME | DRG: 74 | End: 2017-07-30
Attending: INTERNAL MEDICINE | Admitting: INTERNAL MEDICINE
Payer: MEDICARE

## 2017-07-23 VITALS — RESPIRATION RATE: 20 BRPM | SYSTOLIC BLOOD PRESSURE: 162 MMHG | HEART RATE: 93 BPM | DIASTOLIC BLOOD PRESSURE: 70 MMHG

## 2017-07-23 VITALS — RESPIRATION RATE: 16 BRPM | DIASTOLIC BLOOD PRESSURE: 72 MMHG | SYSTOLIC BLOOD PRESSURE: 177 MMHG

## 2017-07-23 VITALS
HEIGHT: 62 IN | HEIGHT: 62 IN | BODY MASS INDEX: 37.85 KG/M2 | WEIGHT: 205.69 LBS | BODY MASS INDEX: 37.85 KG/M2 | WEIGHT: 205.69 LBS

## 2017-07-23 VITALS — TEMPERATURE: 98.1 F

## 2017-07-23 DIAGNOSIS — K31.7: ICD-10-CM

## 2017-07-23 DIAGNOSIS — K31.84: ICD-10-CM

## 2017-07-23 DIAGNOSIS — E11.43: Primary | ICD-10-CM

## 2017-07-23 DIAGNOSIS — R11.2: ICD-10-CM

## 2017-07-23 DIAGNOSIS — K29.70: ICD-10-CM

## 2017-07-23 DIAGNOSIS — K75.81: ICD-10-CM

## 2017-07-23 DIAGNOSIS — K59.00: ICD-10-CM

## 2017-07-23 DIAGNOSIS — K57.90: ICD-10-CM

## 2017-07-23 DIAGNOSIS — I10: ICD-10-CM

## 2017-07-23 DIAGNOSIS — E66.9: ICD-10-CM

## 2017-07-23 DIAGNOSIS — E86.0: ICD-10-CM

## 2017-07-23 LAB
ADD UMIC: YES
ALBUMIN SERPL-MCNC: 4.3 G/DL (ref 3.3–4.9)
ALBUMIN SERPL-MCNC: 4.7 G/DL (ref 3.3–4.9)
ALBUMIN/GLOB SERPL: 1.14 {RATIO}
ALP SERPL-CCNC: 128 IU/L (ref 42–121)
ALP SERPL-CCNC: 160 IU/L (ref 42–121)
ALT SERPL-CCNC: 31 IU/L (ref 13–69)
ALT SERPL-CCNC: 33 IU/L (ref 13–69)
ANION GAP SERPL CALC-SCNC: 21 MMOL/L (ref 8–16)
APTT BLD: 33.2 SEC (ref 25–35)
AST SERPL-CCNC: 26 IU/L (ref 15–46)
AST SERPL-CCNC: 33 IU/L (ref 15–46)
BASOPHILS # BLD AUTO: 0 10^3/UL (ref 0–0.1)
BASOPHILS # BLD AUTO: 0 10^3/UL (ref 0–0.1)
BASOPHILS NFR BLD: 0.4 % (ref 0–2)
BASOPHILS NFR BLD: 0.4 % (ref 0–2)
BILIRUB DIRECT SERPL-MCNC: 0 MG/DL (ref 0–0.2)
BILIRUB DIRECT SERPL-MCNC: 0 MG/DL (ref 0–0.2)
BILIRUB SERPL-MCNC: 0.1 MG/DL (ref 0.2–1.3)
BILIRUB SERPL-MCNC: 0.2 MG/DL (ref 0.2–1.3)
BUN SERPL-MCNC: 13 MG/DL (ref 7–20)
CALCIUM SERPL-MCNC: 11.1 MG/DL (ref 8.4–10.2)
CHLORIDE SERPL-SCNC: 99 MMOL/L (ref 97–110)
CO2 SERPL-SCNC: 29 MMOL/L (ref 21–31)
COLOR UR: YELLOW
CREAT SERPL-MCNC: 0.89 MG/DL (ref 0.44–1)
EOSINOPHIL # BLD: 0 10^3/UL (ref 0–0.5)
EOSINOPHIL # BLD: 0.1 10^3/UL (ref 0–0.5)
EOSINOPHIL NFR BLD: 0.3 % (ref 0–7)
EOSINOPHIL NFR BLD: 0.6 % (ref 0–7)
ERYTHROCYTE [DISTWIDTH] IN BLOOD BY AUTOMATED COUNT: 13.9 % (ref 11.5–14.5)
ERYTHROCYTE [DISTWIDTH] IN BLOOD BY AUTOMATED COUNT: 14 % (ref 11.5–14.5)
GLOBULIN SER-MCNC: 4.1 G/DL (ref 1.3–3.2)
GLUCOSE SERPL-MCNC: 208 MG/DL (ref 70–220)
GLUCOSE UR STRIP-MCNC: (no result) MG/DL
HCT VFR BLD CALC: 38.6 % (ref 37–47)
HCT VFR BLD CALC: 43.6 % (ref 37–47)
HGB BLD-MCNC: 12.6 G/DL (ref 12–16)
HGB BLD-MCNC: 14.8 G/DL (ref 12–16)
INR PPP: 0.92
KETONES UR STRIP.AUTO-MCNC: (no result) MG/DL
LYMPHOCYTES # BLD AUTO: 1.7 10^3/UL (ref 0.8–2.9)
LYMPHOCYTES # BLD AUTO: 2.8 10^3/UL (ref 0.8–2.9)
LYMPHOCYTES NFR BLD AUTO: 14.8 % (ref 15–51)
LYMPHOCYTES NFR BLD AUTO: 29 % (ref 15–51)
MCH RBC QN AUTO: 30.1 PG (ref 29–33)
MCH RBC QN AUTO: 30.9 PG (ref 29–33)
MCHC RBC AUTO-ENTMCNC: 32.6 G/DL (ref 32–37)
MCHC RBC AUTO-ENTMCNC: 33.9 G/DL (ref 32–37)
MCV RBC AUTO: 91 FL (ref 82–101)
MCV RBC AUTO: 92.1 FL (ref 82–101)
MONOCYTES # BLD: 0.3 10^3/UL (ref 0.3–0.9)
MONOCYTES # BLD: 0.4 10^3/UL (ref 0.3–0.9)
MONOCYTES NFR BLD: 2.9 % (ref 0–11)
MONOCYTES NFR BLD: 4.5 % (ref 0–11)
MUCOUS THREADS #/AREA URNS HPF: (no result) /HPF
NEUTROPHILS # BLD: 6.1 10^3/UL (ref 1.6–7.5)
NEUTROPHILS # BLD: 9.1 10^3/UL (ref 1.6–7.5)
NEUTROPHILS NFR BLD AUTO: 65 % (ref 39–77)
NEUTROPHILS NFR BLD AUTO: 81.1 % (ref 39–77)
NITRITE UR QL STRIP.AUTO: NEGATIVE MG/DL
NRBC # BLD MANUAL: 0 10^3/UL (ref 0–0)
NRBC # BLD MANUAL: 0 10^3/UL (ref 0–0)
NRBC BLD AUTO-RTO: 0 /100WBC (ref 0–0)
NRBC BLD AUTO-RTO: 0 /100WBC (ref 0–0)
PLATELET # BLD: 221 10^3/UL (ref 140–415)
PLATELET # BLD: 237 10^3/UL (ref 140–415)
PMV BLD AUTO: 10.1 FL (ref 7.4–10.4)
PMV BLD AUTO: 10.2 FL (ref 7.4–10.4)
POTASSIUM SERPL-SCNC: 4.4 MMOL/L (ref 3.5–5.1)
PROT SERPL-MCNC: 7.8 G/DL (ref 6.1–8.1)
PROT SERPL-MCNC: 8.8 G/DL (ref 6.1–8.1)
PROTHROMBIN TIME: 12.4 SEC (ref 12.2–14.2)
PT RATIO: 1
RBC # BLD AUTO: 4.19 10^6/UL (ref 4.2–5.4)
RBC # BLD AUTO: 4.79 10^6/UL (ref 4.2–5.4)
RBC # UR AUTO: NEGATIVE MG/DL
SODIUM SERPL-SCNC: 145 MMOL/L (ref 135–144)
TROPONIN I SERPL-MCNC: < 0.012 NG/ML (ref 0–0.12)
UR ASCORBIC ACID: NEGATIVE MG/DL
UR BILIRUBIN (DIP): NEGATIVE MG/DL
UR CLARITY: (no result)
UR PH (DIP): 6 (ref 5–9)
UR RBC: 2 /HPF (ref 0–5)
UR SPECIFIC GRAVITY (DIP): 1.02 (ref 1–1.03)
UR TOTAL PROTEIN (DIP): (no result) MG/DL
UROBILINOGEN UR STRIP-ACNC: NEGATIVE MG/DL
WBC # BLD AUTO: 11.2 10^3/UL (ref 4.8–10.8)
WBC # BLD AUTO: 9.5 10^3/UL (ref 4.8–10.8)
WBC # UR STRIP: NEGATIVE LEU/UL

## 2017-07-23 PROCEDURE — 96374 THER/PROPH/DIAG INJ IV PUSH: CPT

## 2017-07-23 PROCEDURE — 88312 SPECIAL STAINS GROUP 1: CPT

## 2017-07-23 PROCEDURE — 80053 COMPREHEN METABOLIC PANEL: CPT

## 2017-07-23 PROCEDURE — 84484 ASSAY OF TROPONIN QUANT: CPT

## 2017-07-23 PROCEDURE — G0378 HOSPITAL OBSERVATION PER HR: HCPCS

## 2017-07-23 PROCEDURE — 88305 TISSUE EXAM BY PATHOLOGIST: CPT

## 2017-07-23 PROCEDURE — 96375 TX/PRO/DX INJ NEW DRUG ADDON: CPT

## 2017-07-23 PROCEDURE — 87340 HEPATITIS B SURFACE AG IA: CPT

## 2017-07-23 PROCEDURE — 86704 HEP B CORE ANTIBODY TOTAL: CPT

## 2017-07-23 PROCEDURE — 82947 ASSAY GLUCOSE BLOOD QUANT: CPT

## 2017-07-23 PROCEDURE — 81001 URINALYSIS AUTO W/SCOPE: CPT

## 2017-07-23 PROCEDURE — 80048 BASIC METABOLIC PNL TOTAL CA: CPT

## 2017-07-23 PROCEDURE — 83690 ASSAY OF LIPASE: CPT

## 2017-07-23 PROCEDURE — 96376 TX/PRO/DX INJ SAME DRUG ADON: CPT

## 2017-07-23 PROCEDURE — 99217: CPT

## 2017-07-23 PROCEDURE — 86803 HEPATITIS C AB TEST: CPT

## 2017-07-23 PROCEDURE — 86709 HEPATITIS A IGM ANTIBODY: CPT

## 2017-07-23 PROCEDURE — 71010: CPT

## 2017-07-23 PROCEDURE — 85025 COMPLETE CBC W/AUTO DIFF WBC: CPT

## 2017-07-23 PROCEDURE — 80076 HEPATIC FUNCTION PANEL: CPT

## 2017-07-23 PROCEDURE — 83036 HEMOGLOBIN GLYCOSYLATED A1C: CPT

## 2017-07-23 PROCEDURE — 85730 THROMBOPLASTIN TIME PARTIAL: CPT

## 2017-07-23 PROCEDURE — 83605 ASSAY OF LACTIC ACID: CPT

## 2017-07-23 PROCEDURE — 74176 CT ABD & PELVIS W/O CONTRAST: CPT

## 2017-07-23 PROCEDURE — 93005 ELECTROCARDIOGRAM TRACING: CPT

## 2017-07-23 PROCEDURE — 36415 COLL VENOUS BLD VENIPUNCTURE: CPT

## 2017-07-23 PROCEDURE — 85610 PROTHROMBIN TIME: CPT

## 2017-07-23 PROCEDURE — 82962 GLUCOSE BLOOD TEST: CPT

## 2017-07-23 RX ADMIN — DOCUSATE SODIUM SCH MG: 100 CAPSULE, LIQUID FILLED ORAL at 21:02

## 2017-07-23 RX ADMIN — HYDRALAZINE HYDROCHLORIDE PRN MG: 20 INJECTION INTRAMUSCULAR; INTRAVENOUS at 21:15

## 2017-07-23 RX ADMIN — INSULIN GLARGINE SCH UNIT: 100 INJECTION, SOLUTION SUBCUTANEOUS at 22:36

## 2017-07-23 RX ADMIN — SENNOSIDES SCH TAB: 8.6 TABLET, FILM COATED ORAL at 21:02

## 2017-07-23 RX ADMIN — CALCIUM GLUCONATE SCH MLS/HR: 94 INJECTION, SOLUTION INTRAVENOUS at 15:54

## 2017-07-23 NOTE — ERA
ER Documentation


Chief Complaint


Date/Time


DATE: 7/23/17 


TIME: 10:39


Chief Complaint


10/10 abd pain with N/V x 1 week





HPI


76-year-old  female history of chronic abdominal pain who presents with 

abdominal pain.  Abdominal pain for approximately 1 week with nonbloody 

nonbilious emesis.  The patient has multiple visits to the emergency room one 

recently within the past week or so.  Patient had a CAT scan with IV contrast 

that showed no acute process other than mild constipation.  However the patient 

still reports 10 out of 10 diffuse abdominal pain with nonbloody nonbilious 

emesis, lack of oral intake.  The patient has had a colonoscopy in the last 

several years that showed polyps and no other acute process.  No abdominal 

surgical history.





ROS


All systems reviewed and are negative except as per history of present illness.





Medications


Home Meds


Reported Medications


Alprazolam* (Alprazolam*) 0.5 Mg Tablet, 0.5 MG PO QHS Y for SLEEP, TAB


   7/23/17


Omeprazole* (Omeprazole*) 40 Mg Capsule.dr, 40 MG PO DAILY, #30 CAP


   7/23/17


Metoprolol Succinate* (Toprol XL*) 100 Mg Tab.sr.24h, 100 MG PO DAILY, #30 TAB


   7/23/17


Insulin Glargine* (Lantus*) 100 Unit/Ml Soln, 35 UNIT SC QPM, #1 VIAL


   7/23/17


Insulin Glargine* (Lantus*) 100 Unit/Ml Soln, 40 UNIT SC QAM, #1 VIAL


   7/23/17


Exenatide Microspheres (Bydureon Pen) 2 Mg/0.65 Ml Pen.injctr, 2 MG SQ Q7D for 

ON Saturday., EACH


   5/21/17


Zolpidem Tartrate* (Zolpidem Tartrate*) 5 Mg Tablet, 10 MG PO HS Y, TAB


   10/16/14


Ca Carbonate/Vitamin D3/Vit K (VIACTIV SOFT CHEW TABLET) 1 Each Tab.chew, 1 

EACH PO WITH MEALS, TAB.CHEW


   9/21/14


Cholecalciferol (Vitamin D3) (VITAMIN D-3) 2,000 Unit Capsule, 2000 UNIT PO 

DAILY


   9/21/14


Amlodipine-Valsartan-HCTZ (Exforge HCT) -12.5 Mg Tab, 1 TAB PO DAILY, TAB


   9/21/14


Discontinued Reported Medications


Insulin Glargine* (Lantus*) 100 Unit/Ml Soln, 30 UNIT SC QPM Y for 0, #1 VIAL


   3/30/17


Insulin Glargine* (Lantus*) 100 Unit/Ml Soln, 45 UNIT SC QAM, #1 VIAL


   3/30/17


Esomeprazole Mag Trihydrate (Nexium) 40 Mg Capsule.dr, 40 MG PO DAILY, CAP


   9/21/14


Metoprolol Succinate* (Toprol XL*) 50 Mg Tab.er.24h, 50 MG PO DAILY, TAB


   9/21/14


Discontinued Scripts


Sucralfate* (Carafate*) 1 Gm Tab, 1 GM PO AC MEALS AND BEDTIME, #30 TAB


   Prov:ANGELINA RODRIGUEZ         7/16/17


Hydrocodone/Acetaminophen (Norco 5-325 Tablet) 1 Each Tablet, 1 EACH PO Q6, #20 

TAB


   Prov:ANGELINA RODRIGUEZ         7/16/17


Ondansetron (Ondansetron Odt) 4 Mg Tab.rapdis, 4 MG PO Q6H Y for NAUSEA AND/OR 

VOMITING, #30 TAB


   Prov:WOLFGANG BARROSO MD         5/21/17


Fluconazole* (Diflucan*) 150 Mg Tablet, 150 MG PO ONCE, #1 TAB


   Prov:GIOVANI SOMMER         3/30/17





Allergies


Allergies:  


Coded Allergies:  


     No Known Allergies (Verified  Allergy, Mild, 7/23/17)





PMhx/Soc


History of Surgery:  No


Anesthesia Reaction:  No


Hx Neurological Disorder:  No


Hx Respiratory Disorders:  No


Hx Cardiac Disorders:  No


Hx Psychiatric Problems:  No


Hx Miscellaneous Medical Probl:  No


Hx Alcohol Use:  No


Hx Substance Use:  No


Hx Tobacco Use:  No





FmHx


Family History:  No diabetes





Physical Exam


Vitals





Vital Signs








  Date Time  Temp Pulse Resp B/P Pulse Ox O2 Delivery O2 Flow Rate FiO2


 


7/23/17 12:39 98.1 96 18 167/81 96   


 


7/23/17 10:19 98.2 120 18 194/86 96   








Physical Exam


General: Morbidly obese, uncomfortable


Head: Normocephalic, atraumatic.


Eyes: Pupils equally reactive, EOM intact


ENT: Moist mucous membranes


Neck: Supple, no lymphadenopathy


Respiratory: Lungs clear bilaterally, no distress


Cardiovascular: RRR, no murmurs, rubs, or gallops


Abdominal: Soft, protuberant, diffuse tenderness without rebound or guarding, 

negative Manning sign, no tenderness to McBurney's point


: Deferred


MSK: No edema, no unilateral swelling, 5/5 strength


Neurologic: Alert and oriented, moving all extremities, normal speech, no focal 

weakness, no cerebellar signs


Skin: No rash


Psych: Normal mood


Result Diagram:  


7/23/17 1040                                                                   

             7/23/17 1040





Results 24 hrs





 Laboratory Tests








Test


  7/23/17


10:40


 


White Blood Count 11.210^3/ul 


 


Red Blood Count 4.7910^6/ul 


 


Hemoglobin 14.8g/dl 


 


Hematocrit 43.6% 


 


Mean Corpuscular Volume 91.0fl 


 


Mean Corpuscular Hemoglobin 30.9pg 


 


Mean Corpuscular Hemoglobin


Concent 33.9g/dl 


 


 


Red Cell Distribution Width 13.9% 


 


Platelet Count 27642^3/UL 


 


Mean Platelet Volume 10.2fl 


 


Neutrophils % 81.1% 


 


Lymphocytes % 14.8% 


 


Monocytes % 2.9% 


 


Eosinophils % 0.3% 


 


Basophils % 0.4% 


 


Nucleated Red Blood Cells % 0.0/100WBC 


 


Neutrophils # 9.110^3/ul 


 


Lymphocytes # 1.710^3/ul 


 


Monocytes # 0.310^3/ul 


 


Eosinophils # 0.010^3/ul 


 


Basophils # 0.010^3/ul 


 


Nucleated Red Blood Cells # 0.010^3/ul 


 


Prothrombin Time 12.4Sec 


 


Prothrombin Time Ratio 1.0 


 


INR International Normalized


Ratio 0.92 


 


 


Activated Partial


Thromboplast Time 33.2Sec 


 


 


Urine Color YELLOW 


 


Urine Clarity


  SLIGHTLY


CLOUDY


 


Urine pH 6.0 


 


Urine Specific Gravity 1.016 


 


Urine Ketones TRACEmg/dL 


 


Urine Nitrite NEGATIVEmg/dL 


 


Urine Bilirubin NEGATIVEmg/dL 


 


Urine Urobilinogen NEGATIVEmg/dL 


 


Urine Leukocyte Esterase NEGATIVELeu/ul 


 


Urine Microscopic RBC 2/HPF 


 


Urine Microscopic WBC 6/HPF 


 


Urine Mucus FEW/HPF 


 


Urine Hemoglobin NEGATIVEmg/dL 


 


Urine Glucose 1+mg/dL 


 


Urine Total Protein 2+mg/dl 


 


Sodium Level 145mmol/L 


 


Potassium Level 4.4mmol/L 


 


Chloride Level 99mmol/L 


 


Carbon Dioxide Level 29mmol/L 


 


Anion Gap 21 


 


Blood Urea Nitrogen 13mg/dl 


 


Creatinine 0.89mg/dl 


 


Glucose Level 208mg/dl 


 


Lactic Acid Level 2.7mmol/L 


 


Calcium Level 11.1mg/dl 


 


Total Bilirubin 0.2mg/dl 


 


Direct Bilirubin 0.00mg/dl 


 


Indirect Bilirubin 0.2mg/dl 


 


Aspartate Amino Transf


(AST/SGOT) 26IU/L 


 


 


Alanine Aminotransferase


(ALT/SGPT) 31IU/L 


 


 


Alkaline Phosphatase 160IU/L 


 


Troponin I < 0.012ng/ml 


 


Total Protein 8.8g/dl 


 


Albumin 4.7g/dl 


 


Globulin 4.10g/dl 


 


Albumin/Globulin Ratio 1.14 


 


Lipase 174U/L 








 Current Medications








 Medications


  (Trade)  Dose


 Ordered  Sig/Ac


 Route


 PRN Reason  Start Time


 Stop Time Status Last Admin


Dose Admin


 


 Sodium Chloride


  (NS)  1,000 ml @ 


 1,000 mls/hr  Q1H STAT


 IV


   7/23/17 10:32


 7/23/17 11:31 DC 7/23/17 10:59


 


 


 Morphine Sulfate


  (morphine)  4 mg  ONCE  STAT


 IV


   7/23/17 10:32


 7/23/17 10:33 DC 7/23/17 10:59


 


 


 Ondansetron HCl


  (Zofran Inj)  4 mg  ONCE  STAT


 IV


   7/23/17 10:32


 7/23/17 10:33 DC 7/23/17 10:59


 


 


 Ondansetron HCl


  (Zofran Inj)  4 mg  BRIDGE ORDER PRN


 IV


 NAUSEA AND/OR VOMITING  7/23/17 13:00


 7/24/17 12:59   


 


 


 Acetaminophen


  (Tylenol Tab)  650 mg  ER BRIDGE PRN


 PO


 MILD PAIN/FEVER  7/23/17 13:00


 7/24/17 12:59   


 











Procedures/MDM


EKG, MONITORS, & DIAGNOSTIC IMAGING:


EKG: I reviewed and interpreted a 12-lead EKG. 


Rhythm: Normal sinus rhythm


Ectopy: None


Intervals: No abnormalities


ST segments: No elevations or depressions


T waves: No contiguous inversions








CT abdomen and pelvis:


IMPRESSION:


 


1.  Unremarkable CT scan of the abdomen and pelvis. 


2.  Mild colonic diverticulosis without diverticulitis.  Mild fecal retention 

within the colon.


3.  No lymphadenopathy, bowel obstruction, obstructive uropathy, inflammatory 

process.


4.  Stable mild hepatomegaly with nodular contour.  No mass, intra or 

extrahepatic biliary ductal dilatation.


5.  Chronic changes as detailed above.


  


 


RPTAT:AAJJ





LAB INTERPRETATION:


Elevated lactic acid, no significant leukocytosis





MEDICAL DECISION MAKING:


The patient has a history of chronic abdominal pain.  However given her age and 

comorbidities she is at risk for acute intra-abdominal process.  My bigger 

concern is that the patient has not tolerated any oral intake in the past 48 

hours.  She appears to be uncomfortable.  Unfortunately repeat laboratory 

testing and CT imaging is required to rule out acute process.  No evidence of 

dissection or mesenteric ischemia given the chronicity of symptoms.  The 

patient had a CT with IV contrast last time.  Plain CT here is appropriate.  

Low threshold for hospitalization given chronicity of symptoms multiple repeat 

visits to the emergency room.





ER COURSE:


The patient has 1 out of 4 Sirs criteria with no evidence of source.  CT belly 

is negative.  The patient's lactic acid is slightly elevated likely secondary 

to vomiting and dehydration.  Fluid resuscitation will continue.  Repeat lactic 

acid pending.  At this time no indication for antibiotics.  I believe the 

patient will benefit from hospitalization for hydration and close monitoring.





I kept the patient and/or family informed of laboratory and diagnostic imaging 

results throughout the emergency room course.





DISPOSITION PLAN:


Medical surgical admission for management of vomiting, abdominal pain, 

dehydration





CONSULTATION:


Accepting care team and consultations:


I discussed the current laboratory data, diagnostic imaging and emergency care 

provided.


Admitting team: Dr. Maza


Admitting team indication: Insurance directed, Dr. Limon, the patient's primary 

care physician requests admission to Dr. Maza


Consulting services: None





Departure


Diagnosis:  


 Primary Impression:  


 Abdominal pain


 Qualified Code:  R10.84 - Generalized abdominal pain


 Additional Impressions:  


 Nausea and vomiting


 Qualified Code:  R11.2 - Nausea and vomiting, intractability of vomiting not 

specified, unspecified vomiting type


 Moderate dehydration


 Lactic acidosis


Condition:  Stable











WOLFGANG BARROSO MD Jul 23, 2017 10:41

## 2017-07-23 NOTE — HP
Date/Time of Note


Date/Time of Note


DATE: 7/23/17 


TIME: 19:20





Assessment/Plan


VTE Prophylaxis


VTE Prophylaxis Intervention:  SCD's





Lines/Catheters


IV Catheter Type (from Nrsg):  Saline Lock





Assessment/Plan


Assessment/Plan


- intractable Abdominal pain. CT showed-  diverticulosis without 

diverticulitis.  Mild fecal retention within the colon.


   - ADMIT TO ms


   - clear liquid diet


   - IVF


   - GI consult- Dr Hitchcock is notified


   - Zosyn


   - bowl regimen


   - resume home meds


-Intractable Nausea and vomiting- none at present


-Moderate dehydration- IVF, am labs


-Lactic acidosis


- Diabetes Mellitus


   - Glycemic control


- SCD for DVT prophylaxis


- Protonix for GI prophylaxis





Patient is getting Bydureon Pen 2 mg SQ q7DAY- staff to check with MD in am if 

needed to be continued here. Further recommendations depend upon patient 

clinical course.  Further recommendations depend upon patient clinical course. 

Plan of care dw Dr Maza/staff/patient daughter.





HPI/ROS


Admit Date/Time


Admit Date/Time


Jul 23, 2017 at 12:54





Hx of Present Illness


HPI


This is a 76-year-old  female with  history of chronic abdominal pain 

is admitted with abdominal pain from x1 week with nonbloody nonbilious emesis.  

The patient  was seen multiple times in  the emergency the past week. In ER, 

CAT scan with IV contrast that showed no acute process other than mild 

constipation but patient c/o 10 out of 10 diffuse abdominal pain with nonbloody 

nonbilious emesis, lack of oral intake.  The patient has had a colonoscopy in 

the last several years that showed polyps and no other acute process.  No 

abdominal surgical history reported.


During exam patient is alert,awake. oriented, seems comfortable. During 

assessment, patient c/o weakness /Denies any chest pain, shortness of breath, 

headache, dizziness, palpitations, focal weakness/numbness, nausea/vomitting.


 Plan of care dw daughter/staff/Dr Youngblood. Daughter at bed side- all Qs 

answered. Patient is admitted under DR Maza on med surg unit.








ROS


All systems reviewed and are negative except as per history of present illness.


Allergies





     No Known Allergies (Verified  Allergy, Mild, 7/23/17)





ROS


Respiratory:  no complaints


Cardiovascular:  no complaints


Gastrointestinal:  nausea, pain


Genitourinary:  no complaints


Skin:  no complaints


Neurologic:  no complaints





PMH/Family/Social


Past Medical History


PMhx/Soc


History of Surgery:  No


Anesthesia Reaction:  No


Hx Neurological Disorder:  No


Hx Respiratory Disorders:  No


Hx Cardiac Disorders:  No


Hx Psychiatric Problems:  No


Hx Miscellaneous Medical Probl:  No


Hx Alcohol Use:  No


Hx Substance Use:  No


Hx Tobacco Use:  No





FmHx


Family History:  No diabetes





Past Surgical History


Past Surgical Hx:  no surgical history





Family History


Significant Family History:  no pertinent family hx





Social History


Alcohol Use:  none


Smoking Status:  Never smoker


Drug Use:  cocaine





Exam/Review of Systems


Vital Signs


Vitals





 Vital Signs








  Date Time  Temp Pulse Resp B/P Pulse Ox O2 Delivery O2 Flow Rate FiO2


 


7/23/17 15:10      Nasal Cannula 2.0 


 


7/23/17 14:55 99.3 93 20 162/70 96   











Exam


Constitutional:  alert, oriented, well developed


Psych:  nl mood/affect


Respiratory:  clear to auscultation, normal air movement


Cardiovascular:  nl pulses, regular rate and rhythm


Gastrointestinal:  soft, tender (Diffuse tenderness without rebound or guarding

, negative Manning sign, no tenderness to McBurney's point)


Musculoskeletal:  nl extremities to inspection


Extremities:  normal pulses


Neurological:  nl mental status, nl speech





Labs


Result Diagram:  


7/23/17 1905                                                                   

             7/23/17 1040








Medications


Medications





 Current Medications


Sodium Chloride (1/2 NS) 1,000 ml @  50 mls/hr Q20H IV  Last administered on 7/ 23/17at 15:54; Admin Dose 50 MLS/HR;  Start 7/23/17 at 15:30


Ondansetron HCl (Zofran Inj) 4 mg Q6H  PRN IV NAUSEA AND/OR VOMITING;  Start 7/ 23/17 at 15:30


Insulin Aspart (Novolog Insulin Pen) NOVOLOG *MILD* ALGORITHM Q6 SC ;  Start 7/ 24/17 at 00:00


Miscellaneous Information 1 ea NOTE XX ;  Start 7/23/17 at 18:30


Glucose (Glutose) 15 gm Q15M  PRN PO DECREASED GLUCOSE;  Start 7/23/17 at 18:30


Glucose (Glutose) 22.5 gm Q15M  PRN PO DECREASED GLUCOSE;  Start 7/23/17 at 18:

30


Dextrose (D50w Syringe) 25 ml Q15M  PRN IV DECREASED GLUCOSE;  Start 7/23/17 at 

18:30


Dextrose (D50w Syringe) 50 ml Q15M  PRN IV DECREASED GLUCOSE;  Start 7/23/17 at 

18:30


Glucagon (Glucagen) 1 mg Q15M  PRN IM DECREASED GLUCOSE;  Start 7/23/17 at 18:30


Glucose (Glutose) 15 gm Q15M  PRN BUCCAL DECREASED GLUCOSE;  Start 7/23/17 at 18

:30





Procedures


Procedures


CT ABDOMEN/PELVIS


IMPRESSION:


 


1.  Unremarkable CT scan of the abdomen and pelvis. 


2.  Mild colonic diverticulosis without diverticulitis.  Mild fecal retention 

within the colon.


3.  No lymphadenopathy, bowel obstruction, obstructive uropathy, inflammatory 

process.


4.  Stable mild hepatomegaly with nodular contour.  No mass, intra or 

extrahepatic biliary ductal dilatation.


5.  Chronic changes as detailed above.











IMAN HOANG Jul 23, 2017 19:33

## 2017-07-23 NOTE — RADRPT
PROCEDURE:   CT Abdomen and Pelvis  without contrast. 

 

CLINICAL INDICATION:  Abdominal pain.  Vomiting.

 

TECHNIQUE:   CT scan of the abdomen and pelvis with and without contrast was performed on a multidet
marlys high-resolution CT scanner.   Coronal and sagittal reformatted images were obtained from the a
xial source images. The total exam CTDI equals 21.99 mGy and the total exam DLP equals 1166.61 mGy-c
m. One or more of the following dose reduction techniques were utilized:  Automated exposure control
, adjustment of the mA and/or kV according to patient size, use of iterative reconstruction techniqu
e.

 

COMPARISON:   07/16/2017 CT abdomen 

 

FINDINGS:

 

CT abdomen:

 

The lung bases are clear.  The heart size is normal, without pericardial thickening or effusion. Sta
ble hepatomegaly with mild nodular contour.  The liver is otherwise normal in density without focal 
mass or intrahepatic biliary dilatation.  The shaye hepatis region is clear. The spleen is normal in
 size and homogeneous in density.  The stomach is partially collapsed, but is grossly unremarkable. 
 The pancreas as visualized is normal. 

 

The gallbladder is mildly distended. No evidence of intra or extra hepatic biliary ductal dilatation
. Minimal nodularity of the adrenal gland.  Bilaterally left greater than right without focal mass.

 

The kidneys demonstrate no parenchymal abnormality. No nephrolithiasis, hydronephrosis, or obstructi
ve uropathy.

 

The aorta is of normal caliber.  Aortic vascular calcifications are present.  There is no ascites, r
etroperitoneal, or mesenteric lymphadenopathy.  The bowel and mesentery, as visualized, are equally 
unremarkable. Mild fecal retention within the colon.  Scattered colonic diverticulosis.

 

CT pelvis:

 

Normal retrocecal appendix.

 

The small bowel loops situated within the pelvis are unremarkable. Fat containing right inguinal her
ramandeep.  The pelvic organs are normal.  The pelvic sidewalls and inguinal regions are clear.  No lympha
denopathy, free fluid, or mass. Sigmoid diverticulosis without diverticulitis.

 

Bones:

 

Multilevel degenerative spondylosis and enthesopathy throughout the visualized thoracic and lumbar s
pine.  Grade 1 anterior anterolisthesis of 5 S1 with bilateral spondylolysis vacuum disk formation. 
 No osteolytic or osteoblastic lesion is detected.  

 

IMPRESSION:

 

1.  Unremarkable CT scan of the abdomen and pelvis. 

2.  Mild colonic diverticulosis without diverticulitis.  Mild fecal retention within the colon.

3.  No lymphadenopathy, bowel obstruction, obstructive uropathy, inflammatory process.

4.  Stable mild hepatomegaly with nodular contour.  No mass, intra or extrahepatic biliary ductal di
latation.

5.  Chronic changes as detailed above.

  

 

RPTAT:[id number]

_____________________________________________ 

JUAN PABLO Jesus Physician           Date    Time 

Electronically viewed and signed by JUAN PABLO Jesus Physician on 07/23/2017 11:56 

 

D:  07/23/2017 11:56  T:  07/23/2017 11:56

ARIES/

## 2017-07-24 VITALS — DIASTOLIC BLOOD PRESSURE: 57 MMHG | RESPIRATION RATE: 16 BRPM | SYSTOLIC BLOOD PRESSURE: 115 MMHG

## 2017-07-24 VITALS — SYSTOLIC BLOOD PRESSURE: 130 MMHG | RESPIRATION RATE: 19 BRPM | DIASTOLIC BLOOD PRESSURE: 68 MMHG

## 2017-07-24 VITALS — DIASTOLIC BLOOD PRESSURE: 69 MMHG | SYSTOLIC BLOOD PRESSURE: 149 MMHG | HEART RATE: 86 BPM

## 2017-07-24 VITALS — DIASTOLIC BLOOD PRESSURE: 76 MMHG | HEART RATE: 86 BPM | SYSTOLIC BLOOD PRESSURE: 179 MMHG

## 2017-07-24 VITALS — DIASTOLIC BLOOD PRESSURE: 68 MMHG | HEART RATE: 84 BPM | SYSTOLIC BLOOD PRESSURE: 156 MMHG

## 2017-07-24 VITALS — RESPIRATION RATE: 18 BRPM | DIASTOLIC BLOOD PRESSURE: 74 MMHG | SYSTOLIC BLOOD PRESSURE: 177 MMHG

## 2017-07-24 VITALS — RESPIRATION RATE: 18 BRPM | SYSTOLIC BLOOD PRESSURE: 189 MMHG | DIASTOLIC BLOOD PRESSURE: 85 MMHG

## 2017-07-24 VITALS — SYSTOLIC BLOOD PRESSURE: 165 MMHG | DIASTOLIC BLOOD PRESSURE: 70 MMHG

## 2017-07-24 LAB
ALBUMIN SERPL-MCNC: 3.8 G/DL (ref 3.3–4.9)
ALBUMIN/GLOB SERPL: 1.08 {RATIO}
ALP SERPL-CCNC: 108 IU/L (ref 42–121)
ALT SERPL-CCNC: 23 IU/L (ref 13–69)
ANION GAP SERPL CALC-SCNC: 18 MMOL/L (ref 8–16)
ANION GAP SERPL CALC-SCNC: 18 MMOL/L (ref 8–16)
AST SERPL-CCNC: 21 IU/L (ref 15–46)
BILIRUB DIRECT SERPL-MCNC: 0 MG/DL (ref 0–0.2)
BILIRUB SERPL-MCNC: 0.2 MG/DL (ref 0.2–1.3)
BUN SERPL-MCNC: 12 MG/DL (ref 7–20)
BUN SERPL-MCNC: 12 MG/DL (ref 7–20)
CALCIUM SERPL-MCNC: 10.1 MG/DL (ref 8.4–10.2)
CALCIUM SERPL-MCNC: 10.2 MG/DL (ref 8.4–10.2)
CHLORIDE SERPL-SCNC: 99 MMOL/L (ref 97–110)
CHLORIDE SERPL-SCNC: 99 MMOL/L (ref 97–110)
CO2 SERPL-SCNC: 28 MMOL/L (ref 21–31)
CO2 SERPL-SCNC: 28 MMOL/L (ref 21–31)
CREAT SERPL-MCNC: 0.86 MG/DL (ref 0.44–1)
CREAT SERPL-MCNC: 0.9 MG/DL (ref 0.44–1)
GLOBULIN SER-MCNC: 3.5 G/DL (ref 1.3–3.2)
GLUCOSE SERPL-MCNC: 148 MG/DL (ref 70–220)
GLUCOSE SERPL-MCNC: 178 MG/DL (ref 70–220)
POTASSIUM SERPL-SCNC: 3.8 MMOL/L (ref 3.5–5.1)
POTASSIUM SERPL-SCNC: 3.9 MMOL/L (ref 3.5–5.1)
PROT SERPL-MCNC: 7.3 G/DL (ref 6.1–8.1)
SODIUM SERPL-SCNC: 141 MMOL/L (ref 135–144)
SODIUM SERPL-SCNC: 141 MMOL/L (ref 135–144)

## 2017-07-24 RX ADMIN — SENNOSIDES SCH TAB: 8.6 TABLET, FILM COATED ORAL at 08:17

## 2017-07-24 RX ADMIN — DOCUSATE SODIUM SCH MG: 100 CAPSULE, LIQUID FILLED ORAL at 21:00

## 2017-07-24 RX ADMIN — SENNOSIDES SCH TAB: 8.6 TABLET, FILM COATED ORAL at 21:00

## 2017-07-24 RX ADMIN — HYDRALAZINE HYDROCHLORIDE PRN MG: 20 INJECTION INTRAMUSCULAR; INTRAVENOUS at 12:13

## 2017-07-24 RX ADMIN — DEXAMETHASONE SODIUM PHOSPHATE PRN MG: 10 INJECTION, SOLUTION INTRAMUSCULAR; INTRAVENOUS at 06:06

## 2017-07-24 RX ADMIN — INSULIN GLARGINE SCH UNIT: 100 INJECTION, SOLUTION SUBCUTANEOUS at 21:00

## 2017-07-24 RX ADMIN — ZOLPIDEM TARTRATE PRN MG: 5 TABLET, FILM COATED ORAL at 00:20

## 2017-07-24 RX ADMIN — DOCUSATE SODIUM SCH MG: 100 CAPSULE, LIQUID FILLED ORAL at 08:17

## 2017-07-24 RX ADMIN — CALCIUM GLUCONATE SCH MLS/HR: 94 INJECTION, SOLUTION INTRAVENOUS at 11:30

## 2017-07-24 RX ADMIN — PIPERACILLIN SODIUM AND TAZOBACTAM SODIUM SCH MLS/HR: 3; .375 INJECTION, POWDER, LYOPHILIZED, FOR SOLUTION INTRAVENOUS at 02:13

## 2017-07-24 RX ADMIN — PIPERACILLIN SODIUM AND TAZOBACTAM SODIUM SCH MLS/HR: 3; .375 INJECTION, POWDER, LYOPHILIZED, FOR SOLUTION INTRAVENOUS at 10:14

## 2017-07-24 RX ADMIN — PIPERACILLIN SODIUM AND TAZOBACTAM SODIUM SCH MLS/HR: 3; .375 INJECTION, POWDER, LYOPHILIZED, FOR SOLUTION INTRAVENOUS at 17:10

## 2017-07-24 RX ADMIN — INSULIN GLARGINE SCH UNIT: 100 INJECTION, SOLUTION SUBCUTANEOUS at 08:19

## 2017-07-24 RX ADMIN — METOPROLOL SUCCINATE SCH MG: 100 TABLET, FILM COATED, EXTENDED RELEASE ORAL at 08:17

## 2017-07-24 RX ADMIN — AMLODIPINE BESYLATE SCH MG: 10 TABLET ORAL at 08:17

## 2017-07-24 RX ADMIN — MORPHINE SULFATE PRN MG: 2 INJECTION, SOLUTION INTRAMUSCULAR; INTRAVENOUS at 13:50

## 2017-07-24 NOTE — CONS
Date/Time of Note


Date/Time of Note


DATE: 7/24/17 


TIME: 17:22





Assessment/Plan


Assessment/Plan


Additional Assessment/Plan


1.  Epigastric pain associated with nausea going on for last 6 month


2.  Irregular surface of the liver probably nodular may have cirrhosis of 

liver.  Patient's alkaline phosphatase is elevated


3.  Diabetes mellitus


4.  Obesity


Plan


Start empirically on Reglan for possible diabetic gastroparesis


Hepatitis panel


Cirrhosis of liver might be due to [place].


Patient may need EGD for abdominal pain vomiting and also surveillance for her 

varicose veins





Consultation Date/Type/Reason


Admit Date/Time


Jul 23, 2017 at 12:54


Reason for Consultation


Abdominal pain and vomiting





Hx of Present Illness


Patient is 76-year-old female with history of diabetes mellitus, presented to 

the ER complaining of abdominal pain confined to the epigastric area associated 

with nausea and vomiting.  This pain is going on for last 6 months.  No 

radiation to any part of the body.  No chest pain no shortness of breath no GI 

bleeding no  or CNS problem.  No history of weight loss.  Patient had a CAT 

scan done which showed the irregular contour consistent with the diagnosis of 

possible cirrhosis.


Respiratory:  no complaints


Cardiovascular:  no complaints


Gastrointestinal:  nausea, pain


Genitourinary:  no complaints


Skin:  no complaints


Neurologic:  no complaints





Past Surgical History


Past Surgical Hx:  no surgical history





Social History


Alcohol Use:  none


Smoking Status:  Never smoker


Drug Use:  none





Exam/Review of Systems


Vital Signs


Vitals





 Vital Signs








  Date Time  Temp Pulse Resp B/P Pulse Ox O2 Delivery O2 Flow Rate FiO2


 


7/24/17 15:04  84  156/68    


 


7/24/17 14:03 98.4  18  98   


 


7/24/17 08:30      Nasal Cannula 2.0 














 Intake and Output   


 


 7/23/17 7/23/17 7/24/17





 15:00 23:00 07:00


 


Intake Total  200 ml 750 ml


 


Balance  200 ml 750 ml











Exam


Constitutional:  alert, oriented, well developed


Psych:  nl mood/affect, no complaints


Head:  atraumatic, normocephalic


Eyes:  EOMI, PERRL, nl conjunctiva, nl lids, nl sclera


ENMT:  nl external ears & nose, nl lips & teeth, nl nasal mucosa & septum


Neck:  non-tender, supple


Respiratory:  clear to auscultation, normal air movement


Cardiovascular:  nl pulses, regular rate and rhythm


Gastrointestinal:  nl liver, spleen, non-tender, soft


Musculoskeletal:  nl extremities to inspection, nl gait and stance


Extremities:  normal pulses


Neurological:  CNS II-XII intact, nl mental status, nl speech, nl strength


Skin:  nl turgor, 


   No rash or lesions


Lymph:  nl lymph nodes





Results


Result Diagram:  


7/23/17 1905                                                                   

             7/24/17 0502





Results 24 hrs





Laboratory Tests








Test


  7/23/17


19:05 7/23/17


22:32 7/24/17


00:11 7/24/17


05:02


 


White Blood Count 9.5     


 


Red Blood Count 4.19  L   


 


Hemoglobin 12.6     


 


Hematocrit 38.6     


 


Mean Corpuscular Volume 92.1     


 


Mean Corpuscular Hemoglobin 30.1     


 


Mean Corpuscular Hemoglobin


Concent 32.6  


  


  


  


 


 


Red Cell Distribution Width 14.0     


 


Platelet Count 221     


 


Mean Platelet Volume 10.1     


 


Neutrophils % 65.0     


 


Lymphocytes % 29.0     


 


Monocytes % 4.5     


 


Eosinophils % 0.6     


 


Basophils % 0.4     


 


Nucleated Red Blood Cells % 0.0     


 


Neutrophils # 6.1     


 


Lymphocytes # 2.8     


 


Monocytes # 0.4     


 


Eosinophils # 0.1     


 


Basophils # 0.0     


 


Nucleated Red Blood Cells # 0.0     


 


Bedside Glucose  167   140   


 


Sodium Level    141  


 


Potassium Level    3.9  


 


Chloride Level    99  


 


Carbon Dioxide Level    28  


 


Anion Gap    18  H


 


Blood Urea Nitrogen    12  


 


Creatinine    0.86  


 


Glucose Level    148  #


 


Hemoglobin A1c    7.8  H


 


Calcium Level    10.2  














Test


  7/24/17


06:02 7/24/17


08:11 7/24/17


11:49 7/24/17


13:00


 


Bedside Glucose 137   137   134   


 


Troponin I    < 0.012  














Test


  7/24/17


17:11 


  


  


 


 


Bedside Glucose 136     











Medications


Medications





 Current Medications


Sodium Chloride (1/2 NS) 1,000 ml @  50 mls/hr Q20H IV  Last administered on 7/ 23/17at 15:54; Admin Dose 50 MLS/HR;  Start 7/23/17 at 15:30


Ondansetron HCl (Zofran Inj) 4 mg Q6H  PRN IV NAUSEA AND/OR VOMITING Last 

administered on 7/24/17at 06:06; Admin Dose 4 MG;  Start 7/23/17 at 15:30


Insulin Aspart (Novolog Insulin Pen) NOVOLOG *MILD* ALGORITHM Q6 SC ;  Start 7/ 24/17 at 00:00


Miscellaneous Information 1 ea NOTE XX ;  Start 7/23/17 at 18:30


Glucose (Glutose) 15 gm Q15M  PRN PO DECREASED GLUCOSE;  Start 7/23/17 at 18:30


Glucose (Glutose) 22.5 gm Q15M  PRN PO DECREASED GLUCOSE;  Start 7/23/17 at 18:

30


Dextrose (D50w Syringe) 25 ml Q15M  PRN IV DECREASED GLUCOSE;  Start 7/23/17 at 

18:30


Dextrose (D50w Syringe) 50 ml Q15M  PRN IV DECREASED GLUCOSE;  Start 7/23/17 at 

18:30


Glucagon (Glucagen) 1 mg Q15M  PRN IM DECREASED GLUCOSE;  Start 7/23/17 at 18:30


Glucose (Glutose) 15 gm Q15M  PRN BUCCAL DECREASED GLUCOSE;  Start 7/23/17 at 18

:30


Ondansetron HCl 4 mg 4 mg Q6H  PRN IV NAUSEA AND/OR VOMITING;  Start 7/23/17 at 

20:00


Piperacillin Sod/ Tazobactam Sod (Zosyn 3.375gm/ 100 ml (Pmx)) 100 ml @  25 mls/

hr TID@02,10,18 IVPB  Last administered on 7/24/17at 17:10; Admin Dose 25 MLS/HR

;  Start 7/24/17 at 02:00


Morphine Sulfate (morphine) 2 mg Q6  PRN IV PAIN LEVEL 7-10 Last administered 

on 7/24/17at 13:50; Admin Dose 2 MG;  Start 7/23/17 at 20:00


Docusate Sodium (Colace) 200 mg BID PO  Last administered on 7/24/17at 08:17; 

Admin Dose 200 MG;  Start 7/23/17 at 21:00


Senna (Senokot) 2 tab BID PO  Last administered on 7/24/17at 08:17; Admin Dose 

2 TAB;  Start 7/23/17 at 21:00


Acetaminophen (Tylenol Tab) 650 mg Q6H  PRN PO PAIN AND OR ELEVATED TEMP Last 

administered on 7/23/17at 21:15; Admin Dose 650 MG;  Start 7/23/17 at 20:00


Cholecalciferol (Vitamin D) 2,000 unit DAILY PO  Last administered on 7/24/17at 

08:17; Admin Dose 2,000 UNIT;  Start 7/24/17 at 09:00


Metoprolol Succinate (Toprol Xl) 100 mg DAILY PO  Last administered on 7/24/ 17at 08:17; Admin Dose 100 MG;  Start 7/24/17 at 09:00


Zolpidem Tartrate (Ambien) 5 mg HS  PRN PO INSOMNIA Last administered on 7/24/ 17at 00:20; Admin Dose 5 MG;  Start 7/23/17 at 20:00


Amlodipine Besylate (Norvasc) 10 mg DAILY PO  Last administered on 7/24/17at 08:

17; Admin Dose 10 MG;  Start 7/24/17 at 09:00


Insulin Glargine (Lantus) 35 unit QPM SC  Last administered on 7/23/17at 22:36; 

Admin Dose 35 UNIT;  Start 7/23/17 at 21:00


Insulin Glargine (Lantus) 40 unit QAM SC  Last administered on 7/24/17at 08:19; 

Admin Dose 40 UNIT;  Start 7/24/17 at 09:00


Miscellaneous Information (* Miscellaneous Pharmacy Order)  ONCE XX ;  Start 7/ 23/17 at 20:00


Miscellaneous Information (* Miscellaneous Pharmacy Order)  ONCE XX ;  Start 7/ 23/17 at 20:00


Hydralazine HCl (Apresoline) 10 mg Q6H  PRN IV ELEVATED BLOOD PRESSURE Last 

administered on 7/24/17at 12:13; Admin Dose 10 MG;  Start 7/23/17 at 20:30











ZULEYMA FERNANDO MD Jul 24, 2017 17:25

## 2017-07-24 NOTE — PN
Date/Time of Note


Date/Time of Note


DATE: 7/24/17 


TIME: 19:15





Assessment/Plan


VTE Prophylaxis


VTE Prophylaxis Intervention:  SCD's





Lines/Catheters


IV Catheter Type (from Nrs):  Saline Lock





Assessment/Plan


Chief Complaint/Hosp Course


Patient denies any nausea and vomiting, denies chest pain denies shortness of 

breath


Problems:  


Assessment/Plan


- Epigastric pain associated with nausea going on for last 6 month.  Dr. Hitchcock 

is following in gastroenterology consultation.  Possible EGD.


- Intractable nausea and vomiting and admission, continue Zofran as needed.


- Possible diabetic gastroparesis, continue Reglan.


- Diabetes mellitus, hemoglobin A1c 7.8, continue Lantus and NovoLog.


- Hypertension, continue metoprolol and Norvasc


- Obesity with BMI of 37.6


Further recommendations based on clinical course.  Plan of care discussed with 

Dr. Maza.





Exam/Review of Systems


Vital Signs


Vitals





 Vital Signs








  Date Time  Temp Pulse Resp B/P Pulse Ox O2 Delivery O2 Flow Rate FiO2


 


7/24/17 15:04  84  156/68    


 


7/24/17 14:03 98.4  18  98   


 


7/24/17 08:30      Nasal Cannula 2.0 














 Intake and Output   


 


 7/23/17 7/23/17 7/24/17





 15:00 23:00 07:00


 


Intake Total  200 ml 750 ml


 


Balance  200 ml 750 ml











Exam


Constitutional:  alert, oriented


Neck:  supple


Respiratory:  normal air movement


Cardiovascular:  nl pulses


Gastrointestinal:  non-tender, soft


Extremities:  normal pulses


Neurological:  nl mental status





Results


Result Diagram:  


7/23/17 1905                                                                   

             7/24/17 0502





Results 24 hrs





Laboratory Tests








Test


  7/23/17


22:32 7/24/17


00:11 7/24/17


05:02 7/24/17


06:02


 


Bedside Glucose 167   140    137  


 


Sodium Level   141   


 


Potassium Level   3.9   


 


Chloride Level   99   


 


Carbon Dioxide Level   28   


 


Anion Gap   18  H 


 


Blood Urea Nitrogen   12   


 


Creatinine   0.86   


 


Glucose Level   148  # 


 


Hemoglobin A1c   7.8  H 


 


Calcium Level   10.2   














Test


  7/24/17


08:11 7/24/17


11:49 7/24/17


13:00 7/24/17


17:11


 


Bedside Glucose 137   134    136  


 


Troponin I   < 0.012   











Medications


Medications





 Current Medications


Sodium Chloride (1/2 NS) 1,000 ml @  50 mls/hr Q20H IV  Last administered on 7/ 23/17at 15:54; Admin Dose 50 MLS/HR;  Start 7/23/17 at 15:30


Ondansetron HCl (Zofran Inj) 4 mg Q6H  PRN IV NAUSEA AND/OR VOMITING Last 

administered on 7/24/17at 06:06; Admin Dose 4 MG;  Start 7/23/17 at 15:30


Miscellaneous Information 1 ea NOTE XX ;  Start 7/23/17 at 18:30


Glucose (Glutose) 15 gm Q15M  PRN PO DECREASED GLUCOSE;  Start 7/23/17 at 18:30


Glucose (Glutose) 22.5 gm Q15M  PRN PO DECREASED GLUCOSE;  Start 7/23/17 at 18:

30


Dextrose (D50w Syringe) 25 ml Q15M  PRN IV DECREASED GLUCOSE;  Start 7/23/17 at 

18:30


Dextrose (D50w Syringe) 50 ml Q15M  PRN IV DECREASED GLUCOSE;  Start 7/23/17 at 

18:30


Glucagon (Glucagen) 1 mg Q15M  PRN IM DECREASED GLUCOSE;  Start 7/23/17 at 18:30


Glucose (Glutose) 15 gm Q15M  PRN BUCCAL DECREASED GLUCOSE;  Start 7/23/17 at 18

:30


Ondansetron HCl 4 mg 4 mg Q6H  PRN IV NAUSEA AND/OR VOMITING;  Start 7/23/17 at 

20:00


Piperacillin Sod/ Tazobactam Sod (Zosyn 3.375gm/ 100 ml (Pmx)) 100 ml @  25 mls/

hr TID@02,10,18 IVPB  Last administered on 7/24/17at 17:10; Admin Dose 25 MLS/HR

;  Start 7/24/17 at 02:00


Morphine Sulfate (morphine) 2 mg Q6  PRN IV PAIN LEVEL 7-10 Last administered 

on 7/24/17at 13:50; Admin Dose 2 MG;  Start 7/23/17 at 20:00


Docusate Sodium (Colace) 200 mg BID PO  Last administered on 7/24/17at 08:17; 

Admin Dose 200 MG;  Start 7/23/17 at 21:00


Senna (Senokot) 2 tab BID PO  Last administered on 7/24/17at 08:17; Admin Dose 

2 TAB;  Start 7/23/17 at 21:00


Acetaminophen (Tylenol Tab) 650 mg Q6H  PRN PO PAIN AND OR ELEVATED TEMP Last 

administered on 7/23/17at 21:15; Admin Dose 650 MG;  Start 7/23/17 at 20:00


Cholecalciferol (Vitamin D) 2,000 unit DAILY PO  Last administered on 7/24/17at 

08:17; Admin Dose 2,000 UNIT;  Start 7/24/17 at 09:00


Metoprolol Succinate (Toprol Xl) 100 mg DAILY PO  Last administered on 7/24/ 17at 08:17; Admin Dose 100 MG;  Start 7/24/17 at 09:00


Zolpidem Tartrate (Ambien) 5 mg HS  PRN PO INSOMNIA Last administered on 7/24/ 17at 00:20; Admin Dose 5 MG;  Start 7/23/17 at 20:00


Amlodipine Besylate (Norvasc) 10 mg DAILY PO  Last administered on 7/24/17at 08:

17; Admin Dose 10 MG;  Start 7/24/17 at 09:00


Insulin Glargine (Lantus) 35 unit QPM SC  Last administered on 7/23/17at 22:36; 

Admin Dose 35 UNIT;  Start 7/23/17 at 21:00


Insulin Glargine (Lantus) 40 unit QAM SC  Last administered on 7/24/17at 08:19; 

Admin Dose 40 UNIT;  Start 7/24/17 at 09:00


Miscellaneous Information (* Miscellaneous Pharmacy Order)  ONCE XX ;  Start 7/ 23/17 at 20:00


Miscellaneous Information (* Miscellaneous Pharmacy Order)  ONCE XX ;  Start 7/ 23/17 at 20:00


Hydralazine HCl (Apresoline) 10 mg Q6H  PRN IV ELEVATED BLOOD PRESSURE Last 

administered on 7/24/17at 12:13; Admin Dose 10 MG;  Start 7/23/17 at 20:30











ALIRIO MC Jul 24, 2017 19:20

## 2017-07-25 VITALS — DIASTOLIC BLOOD PRESSURE: 72 MMHG | SYSTOLIC BLOOD PRESSURE: 181 MMHG | RESPIRATION RATE: 19 BRPM

## 2017-07-25 VITALS — SYSTOLIC BLOOD PRESSURE: 149 MMHG | DIASTOLIC BLOOD PRESSURE: 70 MMHG | RESPIRATION RATE: 18 BRPM

## 2017-07-25 VITALS — RESPIRATION RATE: 20 BRPM | DIASTOLIC BLOOD PRESSURE: 74 MMHG | SYSTOLIC BLOOD PRESSURE: 178 MMHG

## 2017-07-25 VITALS — RESPIRATION RATE: 20 BRPM | DIASTOLIC BLOOD PRESSURE: 76 MMHG | SYSTOLIC BLOOD PRESSURE: 153 MMHG | HEART RATE: 78 BPM

## 2017-07-25 VITALS — DIASTOLIC BLOOD PRESSURE: 69 MMHG | RESPIRATION RATE: 20 BRPM | SYSTOLIC BLOOD PRESSURE: 154 MMHG

## 2017-07-25 LAB
BASOPHILS # BLD AUTO: 0 10^3/UL (ref 0–0.1)
BASOPHILS NFR BLD: 0.3 % (ref 0–2)
EOSINOPHIL # BLD: 0.2 10^3/UL (ref 0–0.5)
EOSINOPHIL NFR BLD: 1.5 % (ref 0–7)
ERYTHROCYTE [DISTWIDTH] IN BLOOD BY AUTOMATED COUNT: 14.2 % (ref 11.5–14.5)
HCT VFR BLD CALC: 39.5 % (ref 37–47)
HGB BLD-MCNC: 12.9 G/DL (ref 12–16)
LYMPHOCYTES # BLD AUTO: 2.9 10^3/UL (ref 0.8–2.9)
LYMPHOCYTES NFR BLD AUTO: 28.7 % (ref 15–51)
MCH RBC QN AUTO: 29.9 PG (ref 29–33)
MCHC RBC AUTO-ENTMCNC: 32.7 G/DL (ref 32–37)
MCV RBC AUTO: 91.4 FL (ref 82–101)
MONOCYTES # BLD: 0.6 10^3/UL (ref 0.3–0.9)
MONOCYTES NFR BLD: 5.5 % (ref 0–11)
NEUTROPHILS # BLD: 6.4 10^3/UL (ref 1.6–7.5)
NEUTROPHILS NFR BLD AUTO: 63.7 % (ref 39–77)
NRBC # BLD MANUAL: 0 10^3/UL (ref 0–0)
NRBC BLD AUTO-RTO: 0 /100WBC (ref 0–0)
PLATELET # BLD: 236 10^3/UL (ref 140–415)
PMV BLD AUTO: 10.1 FL (ref 7.4–10.4)
RBC # BLD AUTO: 4.32 10^6/UL (ref 4.2–5.4)
WBC # BLD AUTO: 10 10^3/UL (ref 4.8–10.8)

## 2017-07-25 RX ADMIN — PIPERACILLIN SODIUM AND TAZOBACTAM SODIUM SCH MLS/HR: 3; .375 INJECTION, POWDER, LYOPHILIZED, FOR SOLUTION INTRAVENOUS at 09:18

## 2017-07-25 RX ADMIN — SENNOSIDES SCH TAB: 8.6 TABLET, FILM COATED ORAL at 09:09

## 2017-07-25 RX ADMIN — SENNOSIDES SCH TAB: 8.6 TABLET, FILM COATED ORAL at 21:07

## 2017-07-25 RX ADMIN — METOPROLOL SUCCINATE SCH MG: 100 TABLET, FILM COATED, EXTENDED RELEASE ORAL at 09:11

## 2017-07-25 RX ADMIN — PIPERACILLIN SODIUM AND TAZOBACTAM SODIUM SCH MLS/HR: 3; .375 INJECTION, POWDER, LYOPHILIZED, FOR SOLUTION INTRAVENOUS at 02:00

## 2017-07-25 RX ADMIN — MORPHINE SULFATE PRN MG: 2 INJECTION, SOLUTION INTRAMUSCULAR; INTRAVENOUS at 17:47

## 2017-07-25 RX ADMIN — INSULIN GLARGINE SCH UNIT: 100 INJECTION, SOLUTION SUBCUTANEOUS at 09:16

## 2017-07-25 RX ADMIN — PIPERACILLIN SODIUM AND TAZOBACTAM SODIUM SCH MLS/HR: 3; .375 INJECTION, POWDER, LYOPHILIZED, FOR SOLUTION INTRAVENOUS at 17:46

## 2017-07-25 RX ADMIN — DOCUSATE SODIUM SCH MG: 100 CAPSULE, LIQUID FILLED ORAL at 09:09

## 2017-07-25 RX ADMIN — INSULIN GLARGINE SCH UNIT: 100 INJECTION, SOLUTION SUBCUTANEOUS at 21:08

## 2017-07-25 RX ADMIN — DOCUSATE SODIUM SCH MG: 100 CAPSULE, LIQUID FILLED ORAL at 21:06

## 2017-07-25 RX ADMIN — ZOLPIDEM TARTRATE PRN MG: 5 TABLET, FILM COATED ORAL at 22:02

## 2017-07-25 RX ADMIN — AMLODIPINE BESYLATE SCH MG: 10 TABLET ORAL at 09:10

## 2017-07-25 RX ADMIN — CALCIUM GLUCONATE SCH MLS/HR: 94 INJECTION, SOLUTION INTRAVENOUS at 01:00

## 2017-07-25 NOTE — CONS
Date/Time of Note


Date/Time of Note


DATE: 7/25/17 


TIME: 19:44





Assessment/Plan


Assessment/Plan


Chief Complaint/Hosp Course


Patient is 76-year-old female with history of diabetes mellitus, presented to 

the ER complaining of abdominal pain confined to the epigastric area associated 

with nausea and vomiting.  This pain is going on for last 6 months.  No 

radiation to any part of the body.  No chest pain no shortness of breath no GI 

bleeding no  or CNS problem.  No history of weight loss.  Patient had a CAT 

scan done which showed the irregular contour consistent with the diagnosis of 

possible cirrhosis.


Problems:  


Additional Assessment/Plan


Additional Assessment/Plan


1.  Epigastric pain associated with nausea going on for last 6 month


2.  Irregular surface of the liver probably nodular may have cirrhosis of 

liver.  Patient's alkaline phosphatase is elevated


3.  Diabetes mellitus


4.  Obesity


Plan


Start empirically on Reglan for possible diabetic gastroparesis


Hepatitis panel


Cirrhosis of liver might be due to [place].


Patient may need EGD for abdominal pain vomiting and also surveillance for her 

varicose veins


Colonoscopy for abdominal pain and constipation





Consultation Date/Type/Reason


Admit Date/Time


Jul 25, 2017 at 16:00


Initial Consult Date








24 HR Interval Summary


Free Text/Dictation


Complaints of severe, constipation.  Patient also is demanding colonoscopy





Exam/Review of Systems


Vital Signs


Vitals





 Vital Signs








  Date Time  Temp Pulse Resp B/P Pulse Ox O2 Delivery O2 Flow Rate FiO2


 


7/25/17 14:00 97.8 75 18 149/70 95   


 


7/25/17 09:00      Nasal Cannula 2.0 














 Intake and Output   


 


 7/24/17 7/24/17 7/25/17





 15:00 23:00 07:00


 


Intake Total 250 ml 1010 ml 500 ml


 


Balance 250 ml 1010 ml 500 ml











Exam


Constitutional:  alert, oriented, well developed


Psych:  nl mood/affect, no complaints


Head:  atraumatic, normocephalic


Eyes:  EOMI, PERRL, nl conjunctiva, nl lids, nl sclera


ENMT:  nl external ears & nose, nl lips & teeth, nl nasal mucosa & septum


Neck:  non-tender, supple


Respiratory:  clear to auscultation, normal air movement


Cardiovascular:  nl pulses, regular rate and rhythm


Gastrointestinal:  nl liver, spleen, non-tender, soft


Musculoskeletal:  nl extremities to inspection, nl gait and stance


Extremities:  normal pulses


Neurological:  CNS II-XII intact, nl mental status, nl speech, nl strength


Skin:  nl turgor, 


   No rash or lesions


Lymph:  nl lymph nodes





Results


Result Diagram:  


7/25/17 0539                                                                   

             7/24/17 2005





Results 24 hrs





Laboratory Tests








Test


  7/24/17


20:05 7/24/17


20:47 7/24/17


22:42 7/25/17


05:39


 


Sodium Level 141     


 


Potassium Level 3.8     


 


Chloride Level 99     


 


Carbon Dioxide Level 28     


 


Anion Gap 18  H   


 


Blood Urea Nitrogen 12     


 


Creatinine 0.90     


 


Glucose Level 178     


 


Calcium Level 10.1     


 


Total Bilirubin 0.2     


 


Direct Bilirubin 0.00     


 


Indirect Bilirubin 0.2     


 


Aspartate Amino Transf


(AST/SGOT) 21  


  


  


  


 


 


Alanine Aminotransferase


(ALT/SGPT) 23  


  


  


  


 


 


Alkaline Phosphatase 108     


 


Total Protein 7.3     


 


Albumin 3.8     


 


Globulin 3.50  H   


 


Albumin/Globulin Ratio 1.08     


 


Bedside Glucose  171   146   


 


White Blood Count    10.0  


 


Red Blood Count    4.32  


 


Hemoglobin    12.9  


 


Hematocrit    39.5  


 


Mean Corpuscular Volume    91.4  


 


Mean Corpuscular Hemoglobin    29.9  


 


Mean Corpuscular Hemoglobin


Concent 


  


  


  32.7  


 


 


Red Cell Distribution Width    14.2  


 


Platelet Count    236  


 


Mean Platelet Volume    10.1  


 


Neutrophils %    63.7  


 


Lymphocytes %    28.7  


 


Monocytes %    5.5  


 


Eosinophils %    1.5  


 


Basophils %    0.3  


 


Nucleated Red Blood Cells %    0.0  


 


Neutrophils #    6.4  


 


Lymphocytes #    2.9  


 


Monocytes #    0.6  


 


Eosinophils #    0.2  


 


Basophils #    0.0  


 


Nucleated Red Blood Cells #    0.0  














Test


  7/25/17


08:08 7/25/17


11:39 7/25/17


17:33 


 


 


Bedside Glucose 119   137   160   











Medications


Medications





 Current Medications


Sodium Chloride (1/2 NS) 1,000 ml @  50 mls/hr Q20H IV  Last administered on 7/ 25/17at 01:00; Admin Dose 50 MLS/HR;  Start 7/23/17 at 15:30


Ondansetron HCl (Zofran Inj) 4 mg Q6H  PRN IV NAUSEA AND/OR VOMITING Last 

administered on 7/24/17at 06:06; Admin Dose 4 MG;  Start 7/23/17 at 15:30


Miscellaneous Information 1 ea NOTE XX ;  Start 7/23/17 at 18:30


Glucose (Glutose) 15 gm Q15M  PRN PO DECREASED GLUCOSE;  Start 7/23/17 at 18:30


Glucose (Glutose) 22.5 gm Q15M  PRN PO DECREASED GLUCOSE;  Start 7/23/17 at 18:

30


Dextrose (D50w Syringe) 25 ml Q15M  PRN IV DECREASED GLUCOSE;  Start 7/23/17 at 

18:30


Dextrose (D50w Syringe) 50 ml Q15M  PRN IV DECREASED GLUCOSE;  Start 7/23/17 at 

18:30


Glucagon (Glucagen) 1 mg Q15M  PRN IM DECREASED GLUCOSE;  Start 7/23/17 at 18:30


Glucose (Glutose) 15 gm Q15M  PRN BUCCAL DECREASED GLUCOSE;  Start 7/23/17 at 18

:30


Ondansetron HCl 4 mg 4 mg Q6H  PRN IV NAUSEA AND/OR VOMITING;  Start 7/23/17 at 

20:00


Piperacillin Sod/ Tazobactam Sod (Zosyn 3.375gm/ 100 ml (Pmx)) 100 ml @  25 mls/

hr TID@02,10,18 IVPB  Last administered on 7/25/17at 17:46; Admin Dose 25 MLS/HR

;  Start 7/24/17 at 02:00


Morphine Sulfate (morphine) 2 mg Q6  PRN IV PAIN LEVEL 7-10 Last administered 

on 7/25/17at 17:47; Admin Dose 2 MG;  Start 7/23/17 at 20:00


Docusate Sodium (Colace) 200 mg BID PO  Last administered on 7/25/17at 09:09; 

Admin Dose 200 MG;  Start 7/23/17 at 21:00


Senna (Senokot) 2 tab BID PO  Last administered on 7/25/17at 09:09; Admin Dose 

2 TAB;  Start 7/23/17 at 21:00


Acetaminophen (Tylenol Tab) 650 mg Q6H  PRN PO PAIN AND OR ELEVATED TEMP Last 

administered on 7/23/17at 21:15; Admin Dose 650 MG;  Start 7/23/17 at 20:00


Cholecalciferol (Vitamin D) 2,000 unit DAILY PO  Last administered on 7/25/17at 

09:12; Admin Dose 2,000 UNIT;  Start 7/24/17 at 09:00


Metoprolol Succinate (Toprol Xl) 100 mg DAILY PO  Last administered on 7/25/ 17at 09:11; Admin Dose 100 MG;  Start 7/24/17 at 09:00


Zolpidem Tartrate (Ambien) 5 mg HS  PRN PO INSOMNIA Last administered on 7/24/ 17at 00:20; Admin Dose 5 MG;  Start 7/23/17 at 20:00


Amlodipine Besylate (Norvasc) 10 mg DAILY PO  Last administered on 7/25/17at 09:

10; Admin Dose 10 MG;  Start 7/24/17 at 09:00


Insulin Glargine (Lantus) 35 unit QPM SC  Last administered on 7/24/17at 21:00; 

Admin Dose 35 UNIT;  Start 7/23/17 at 21:00


Insulin Glargine (Lantus) 40 unit QAM SC  Last administered on 7/25/17at 09:16; 

Admin Dose 40 UNIT;  Start 7/24/17 at 09:00


Miscellaneous Information (* Miscellaneous Pharmacy Order)  ONCE XX ;  Start 7/ 23/17 at 20:00


Miscellaneous Information (* Miscellaneous Pharmacy Order)  ONCE XX ;  Start 7/ 23/17 at 20:00


Hydralazine HCl (Apresoline) 10 mg Q6H  PRN IV ELEVATED BLOOD PRESSURE Last 

administered on 7/24/17at 12:13; Admin Dose 10 MG;  Start 7/23/17 at 20:30


Alprazolam (Xanax) 0.5 mg Q6H  PRN PO ANXIETY;  Start 7/25/17 at 16:00











ZULEYMA FERNANDO MD Jul 25, 2017 19:45

## 2017-07-25 NOTE — PN
Date/Time of Note


Date/Time of Note


DATE: 7/25/17 


TIME: 17:18





Assessment/Plan


VTE Prophylaxis


VTE Prophylaxis Intervention:  SCD's





Lines/Catheters


IV Catheter Type (from [facility]):  Saline Lock





Assessment/Plan


Chief Complaint/Hosp Course


Patient complains of left upper quadrant pain, denies nausea vomiting.


Assessment/Plan


- Epigastric pain associated with nausea going on for last 6 month.  Dr. Hitchcock 

is following in gastroenterology consultation.  Possible EGD.


- Intractable nausea and vomiting and admission, continue Zofran as needed.


- Possible diabetic gastroparesis, continue Reglan.


- Diabetes mellitus, hemoglobin A1c 7.8, continue Lantus and NovoLog.


- Hypertension, continue metoprolol and Norvasc


- Obesity with BMI of 37.6


Further recommendations based on clinical course.  Plan of care discussed with 

Dr. Maza.


Problems:  





Exam/Review of Systems


Vital Signs


Vitals





 Vital Signs








  Date Time  Temp Pulse Resp B/P Pulse Ox O2 Delivery O2 Flow Rate FiO2


 


7/25/17 14:00 97.8 75 18 149/70 95   


 


7/25/17 09:00      Nasal Cannula 2.0 














 Intake and Output   


 


 7/24/17 7/24/17 7/25/17





 15:00 23:00 07:00


 


Intake Total 250 ml 1010 ml 500 ml


 


Balance 250 ml 1010 ml 500 ml











Exam


Constitutional:  alert, oriented


Neck:  supple


Respiratory:  normal air movement


Cardiovascular:  nl pulses


Gastrointestinal:  non-tender, soft


Extremities:  normal pulses


Neurological:  nl mental status





Results


Result Diagram:  


7/25/17 0539                                                                   

             7/24/17 2005





Results 24 hrs





Laboratory Tests








Test


  7/24/17


20:05 7/24/17


20:47 7/24/17


22:42 7/25/17


05:39


 


Sodium Level 141     


 


Potassium Level 3.8     


 


Chloride Level 99     


 


Carbon Dioxide Level 28     


 


Anion Gap 18  H   


 


Blood Urea Nitrogen 12     


 


Creatinine 0.90     


 


Glucose Level 178     


 


Calcium Level 10.1     


 


Total Bilirubin 0.2     


 


Direct Bilirubin 0.00     


 


Indirect Bilirubin 0.2     


 


Aspartate Amino Transf


(AST/SGOT) 21  


  


  


  


 


 


Alanine Aminotransferase


(ALT/SGPT) 23  


  


  


  


 


 


Alkaline Phosphatase 108     


 


Total Protein 7.3     


 


Albumin 3.8     


 


Globulin 3.50  H   


 


Albumin/Globulin Ratio 1.08     


 


Bedside Glucose  171   146   


 


White Blood Count    10.0  


 


Red Blood Count    4.32  


 


Hemoglobin    12.9  


 


Hematocrit    39.5  


 


Mean Corpuscular Volume    91.4  


 


Mean Corpuscular Hemoglobin    29.9  


 


Mean Corpuscular Hemoglobin


Concent 


  


  


  32.7  


 


 


Red Cell Distribution Width    14.2  


 


Platelet Count    236  


 


Mean Platelet Volume    10.1  


 


Neutrophils %    63.7  


 


Lymphocytes %    28.7  


 


Monocytes %    5.5  


 


Eosinophils %    1.5  


 


Basophils %    0.3  


 


Nucleated Red Blood Cells %    0.0  


 


Neutrophils #    6.4  


 


Lymphocytes #    2.9  


 


Monocytes #    0.6  


 


Eosinophils #    0.2  


 


Basophils #    0.0  


 


Nucleated Red Blood Cells #    0.0  














Test


  7/25/17


08:08 7/25/17


11:39 


  


 


 


Bedside Glucose 119   137    











Medications


Medications





 Current Medications


Sodium Chloride (1/2 NS) 1,000 ml @  50 mls/hr Q20H IV  Last administered on 7/ 25/17at 01:00; Admin Dose 50 MLS/HR;  Start 7/23/17 at 15:30


Ondansetron HCl (Zofran Inj) 4 mg Q6H  PRN IV NAUSEA AND/OR VOMITING Last 

administered on 7/24/17at 06:06; Admin Dose 4 MG;  Start 7/23/17 at 15:30


Miscellaneous Information 1 ea NOTE XX ;  Start 7/23/17 at 18:30


Glucose (Glutose) 15 gm Q15M  PRN PO DECREASED GLUCOSE;  Start 7/23/17 at 18:30


Glucose (Glutose) 22.5 gm Q15M  PRN PO DECREASED GLUCOSE;  Start 7/23/17 at 18:

30


Dextrose (D50w Syringe) 25 ml Q15M  PRN IV DECREASED GLUCOSE;  Start 7/23/17 at 

18:30


Dextrose (D50w Syringe) 50 ml Q15M  PRN IV DECREASED GLUCOSE;  Start 7/23/17 at 

18:30


Glucagon (Glucagen) 1 mg Q15M  PRN IM DECREASED GLUCOSE;  Start 7/23/17 at 18:30


Glucose (Glutose) 15 gm Q15M  PRN BUCCAL DECREASED GLUCOSE;  Start 7/23/17 at 18

:30


Ondansetron HCl 4 mg 4 mg Q6H  PRN IV NAUSEA AND/OR VOMITING;  Start 7/23/17 at 

20:00


Piperacillin Sod/ Tazobactam Sod (Zosyn 3.375gm/ 100 ml (Pmx)) 100 ml @  25 mls/

hr TID@02,10,18 IVPB  Last administered on 7/25/17at 09:18; Admin Dose 25 MLS/HR

;  Start 7/24/17 at 02:00


Morphine Sulfate (morphine) 2 mg Q6  PRN IV PAIN LEVEL 7-10 Last administered 

on 7/24/17at 13:50; Admin Dose 2 MG;  Start 7/23/17 at 20:00


Docusate Sodium (Colace) 200 mg BID PO  Last administered on 7/25/17at 09:09; 

Admin Dose 200 MG;  Start 7/23/17 at 21:00


Senna (Senokot) 2 tab BID PO  Last administered on 7/25/17at 09:09; Admin Dose 

2 TAB;  Start 7/23/17 at 21:00


Acetaminophen (Tylenol Tab) 650 mg Q6H  PRN PO PAIN AND OR ELEVATED TEMP Last 

administered on 7/23/17at 21:15; Admin Dose 650 MG;  Start 7/23/17 at 20:00


Cholecalciferol (Vitamin D) 2,000 unit DAILY PO  Last administered on 7/25/17at 

09:12; Admin Dose 2,000 UNIT;  Start 7/24/17 at 09:00


Metoprolol Succinate (Toprol Xl) 100 mg DAILY PO  Last administered on 7/25/ 17at 09:11; Admin Dose 100 MG;  Start 7/24/17 at 09:00


Zolpidem Tartrate (Ambien) 5 mg HS  PRN PO INSOMNIA Last administered on 7/24/ 17at 00:20; Admin Dose 5 MG;  Start 7/23/17 at 20:00


Amlodipine Besylate (Norvasc) 10 mg DAILY PO  Last administered on 7/25/17at 09:

10; Admin Dose 10 MG;  Start 7/24/17 at 09:00


Insulin Glargine (Lantus) 35 unit QPM SC  Last administered on 7/24/17at 21:00; 

Admin Dose 35 UNIT;  Start 7/23/17 at 21:00


Insulin Glargine (Lantus) 40 unit QAM SC  Last administered on 7/25/17at 09:16; 

Admin Dose 40 UNIT;  Start 7/24/17 at 09:00


Miscellaneous Information (* Miscellaneous Pharmacy Order)  ONCE XX ;  Start 7/ 23/17 at 20:00


Miscellaneous Information (* Miscellaneous Pharmacy Order)  ONCE XX ;  Start 7/ 23/17 at 20:00


Hydralazine HCl (Apresoline) 10 mg Q6H  PRN IV ELEVATED BLOOD PRESSURE Last 

administered on 7/24/17at 12:13; Admin Dose 10 MG;  Start 7/23/17 at 20:30


Alprazolam (Xanax) 0.5 mg Q6H  PRN PO ANXIETY;  Start 7/25/17 at 16:00











ALIRIO MC Jul 25, 2017 17:19

## 2017-07-25 NOTE — RADRPT
PROCEDURE:   XR Chest. 

 

CLINICAL INDICATION:   Shortness of breath.

 

TECHNIQUE:   Single frontal view. 

 

COMPARISON:   07/16/2017. 

 

FINDINGS:

The lungs are clear. 

The heart size is normal.  There is calcification in the aorta consistent with atherosclerosis. 

There is no pleural effusion. 

There is no pneumothorax.   

 

IMPRESSION:

1.  Atherosclerosis.

2.  Otherwise normal chest radiograph.

 

RPTAT: QQ

_____________________________________________ 

.Miguel Granados MD, MD           Date    Time 

Electronically viewed and signed by .Miguel Granados MD, MD on 07/25/2017 17:15 

 

D:  07/25/2017 17:15  T:  07/25/2017 17:15

.R/

## 2017-07-26 VITALS — HEART RATE: 81 BPM | SYSTOLIC BLOOD PRESSURE: 156 MMHG | DIASTOLIC BLOOD PRESSURE: 79 MMHG

## 2017-07-26 VITALS — RESPIRATION RATE: 18 BRPM | SYSTOLIC BLOOD PRESSURE: 173 MMHG | DIASTOLIC BLOOD PRESSURE: 73 MMHG

## 2017-07-26 VITALS — RESPIRATION RATE: 19 BRPM | HEART RATE: 78 BPM | SYSTOLIC BLOOD PRESSURE: 150 MMHG | DIASTOLIC BLOOD PRESSURE: 68 MMHG

## 2017-07-26 VITALS — RESPIRATION RATE: 22 BRPM | SYSTOLIC BLOOD PRESSURE: 149 MMHG | DIASTOLIC BLOOD PRESSURE: 68 MMHG | HEART RATE: 78 BPM

## 2017-07-26 VITALS — HEART RATE: 85 BPM | SYSTOLIC BLOOD PRESSURE: 155 MMHG | DIASTOLIC BLOOD PRESSURE: 73 MMHG

## 2017-07-26 VITALS — SYSTOLIC BLOOD PRESSURE: 148 MMHG | DIASTOLIC BLOOD PRESSURE: 65 MMHG | RESPIRATION RATE: 19 BRPM | HEART RATE: 84 BPM

## 2017-07-26 VITALS — DIASTOLIC BLOOD PRESSURE: 75 MMHG | SYSTOLIC BLOOD PRESSURE: 190 MMHG | HEART RATE: 86 BPM

## 2017-07-26 VITALS — SYSTOLIC BLOOD PRESSURE: 183 MMHG | RESPIRATION RATE: 16 BRPM | DIASTOLIC BLOOD PRESSURE: 77 MMHG

## 2017-07-26 VITALS — DIASTOLIC BLOOD PRESSURE: 72 MMHG | SYSTOLIC BLOOD PRESSURE: 174 MMHG | HEART RATE: 76 BPM | RESPIRATION RATE: 19 BRPM

## 2017-07-26 VITALS — DIASTOLIC BLOOD PRESSURE: 67 MMHG | HEART RATE: 76 BPM | SYSTOLIC BLOOD PRESSURE: 169 MMHG | RESPIRATION RATE: 24 BRPM

## 2017-07-26 VITALS — HEART RATE: 85 BPM | SYSTOLIC BLOOD PRESSURE: 172 MMHG | DIASTOLIC BLOOD PRESSURE: 75 MMHG

## 2017-07-26 VITALS — SYSTOLIC BLOOD PRESSURE: 156 MMHG | HEART RATE: 80 BPM | RESPIRATION RATE: 28 BRPM | DIASTOLIC BLOOD PRESSURE: 66 MMHG

## 2017-07-26 VITALS — DIASTOLIC BLOOD PRESSURE: 66 MMHG | SYSTOLIC BLOOD PRESSURE: 152 MMHG | HEART RATE: 77 BPM | RESPIRATION RATE: 25 BRPM

## 2017-07-26 VITALS — HEART RATE: 76 BPM | SYSTOLIC BLOOD PRESSURE: 180 MMHG | RESPIRATION RATE: 27 BRPM | DIASTOLIC BLOOD PRESSURE: 68 MMHG

## 2017-07-26 VITALS — DIASTOLIC BLOOD PRESSURE: 70 MMHG | RESPIRATION RATE: 20 BRPM | SYSTOLIC BLOOD PRESSURE: 123 MMHG | HEART RATE: 71 BPM

## 2017-07-26 VITALS — DIASTOLIC BLOOD PRESSURE: 69 MMHG | RESPIRATION RATE: 19 BRPM | SYSTOLIC BLOOD PRESSURE: 160 MMHG | HEART RATE: 90 BPM

## 2017-07-26 VITALS — DIASTOLIC BLOOD PRESSURE: 69 MMHG | SYSTOLIC BLOOD PRESSURE: 160 MMHG | RESPIRATION RATE: 18 BRPM

## 2017-07-26 VITALS — SYSTOLIC BLOOD PRESSURE: 168 MMHG | HEART RATE: 85 BPM | DIASTOLIC BLOOD PRESSURE: 71 MMHG

## 2017-07-26 VITALS — SYSTOLIC BLOOD PRESSURE: 179 MMHG | RESPIRATION RATE: 22 BRPM | HEART RATE: 77 BPM | DIASTOLIC BLOOD PRESSURE: 87 MMHG

## 2017-07-26 VITALS — SYSTOLIC BLOOD PRESSURE: 190 MMHG | HEART RATE: 84 BPM | DIASTOLIC BLOOD PRESSURE: 74 MMHG

## 2017-07-26 LAB
ANION GAP SERPL CALC-SCNC: 16 MMOL/L (ref 8–16)
BASOPHILS # BLD AUTO: 0 10^3/UL (ref 0–0.1)
BASOPHILS NFR BLD: 0.3 % (ref 0–2)
BUN SERPL-MCNC: 10 MG/DL (ref 7–20)
CALCIUM SERPL-MCNC: 9.8 MG/DL (ref 8.4–10.2)
CHLORIDE SERPL-SCNC: 103 MMOL/L (ref 97–110)
CO2 SERPL-SCNC: 29 MMOL/L (ref 21–31)
CREAT SERPL-MCNC: 0.9 MG/DL (ref 0.44–1)
DEPRECATED HBV CORE AB SER IA-ACNC: NEGATIVE
EOSINOPHIL # BLD: 0.1 10^3/UL (ref 0–0.5)
EOSINOPHIL NFR BLD: 0.9 % (ref 0–7)
ERYTHROCYTE [DISTWIDTH] IN BLOOD BY AUTOMATED COUNT: 14 % (ref 11.5–14.5)
GLUCOSE SERPL-MCNC: 78 MG/DL (ref 70–220)
HAAIG REFLEX: (no result)
HCT VFR BLD CALC: 38 % (ref 37–47)
HGB BLD-MCNC: 12.6 G/DL (ref 12–16)
LYMPHOCYTES # BLD AUTO: 2.9 10^3/UL (ref 0.8–2.9)
LYMPHOCYTES NFR BLD AUTO: 30.7 % (ref 15–51)
MCH RBC QN AUTO: 30.1 PG (ref 29–33)
MCHC RBC AUTO-ENTMCNC: 33.2 G/DL (ref 32–37)
MCV RBC AUTO: 90.9 FL (ref 82–101)
MONOCYTES # BLD: 0.6 10^3/UL (ref 0.3–0.9)
MONOCYTES NFR BLD: 6 % (ref 0–11)
NEUTROPHILS # BLD: 5.8 10^3/UL (ref 1.6–7.5)
NEUTROPHILS NFR BLD AUTO: 61.8 % (ref 39–77)
NRBC # BLD MANUAL: 0 10^3/UL (ref 0–0)
NRBC BLD AUTO-RTO: 0 /100WBC (ref 0–0)
PLATELET # BLD: 243 10^3/UL (ref 140–415)
PMV BLD AUTO: 9.8 FL (ref 7.4–10.4)
POTASSIUM SERPL-SCNC: 3.3 MMOL/L (ref 3.5–5.1)
RBC # BLD AUTO: 4.18 10^6/UL (ref 4.2–5.4)
SODIUM SERPL-SCNC: 145 MMOL/L (ref 135–144)
WBC # BLD AUTO: 9.4 10^3/UL (ref 4.8–10.8)

## 2017-07-26 PROCEDURE — 0DB68ZX EXCISION OF STOMACH, VIA NATURAL OR ARTIFICIAL OPENING ENDOSCOPIC, DIAGNOSTIC: ICD-10-PCS | Performed by: INTERNAL MEDICINE

## 2017-07-26 PROCEDURE — 0DJD8ZZ INSPECTION OF LOWER INTESTINAL TRACT, VIA NATURAL OR ARTIFICIAL OPENING ENDOSCOPIC: ICD-10-PCS | Performed by: INTERNAL MEDICINE

## 2017-07-26 RX ADMIN — PIPERACILLIN SODIUM AND TAZOBACTAM SODIUM SCH MLS/HR: 3; .375 INJECTION, POWDER, LYOPHILIZED, FOR SOLUTION INTRAVENOUS at 09:33

## 2017-07-26 RX ADMIN — INSULIN GLARGINE SCH UNIT: 100 INJECTION, SOLUTION SUBCUTANEOUS at 10:53

## 2017-07-26 RX ADMIN — DOCUSATE SODIUM SCH MG: 100 CAPSULE, LIQUID FILLED ORAL at 09:00

## 2017-07-26 RX ADMIN — ZOLPIDEM TARTRATE PRN MG: 5 TABLET, FILM COATED ORAL at 20:59

## 2017-07-26 RX ADMIN — AMLODIPINE BESYLATE SCH MG: 10 TABLET ORAL at 08:55

## 2017-07-26 RX ADMIN — DOCUSATE SODIUM SCH MG: 100 CAPSULE, LIQUID FILLED ORAL at 21:06

## 2017-07-26 RX ADMIN — SENNOSIDES SCH TAB: 8.6 TABLET, FILM COATED ORAL at 09:00

## 2017-07-26 RX ADMIN — HYDRALAZINE HYDROCHLORIDE PRN MG: 20 INJECTION INTRAMUSCULAR; INTRAVENOUS at 16:51

## 2017-07-26 RX ADMIN — DEXAMETHASONE SODIUM PHOSPHATE PRN MG: 10 INJECTION, SOLUTION INTRAMUSCULAR; INTRAVENOUS at 20:55

## 2017-07-26 RX ADMIN — SENNOSIDES SCH TAB: 8.6 TABLET, FILM COATED ORAL at 20:54

## 2017-07-26 RX ADMIN — INSULIN GLARGINE SCH UNIT: 100 INJECTION, SOLUTION SUBCUTANEOUS at 21:07

## 2017-07-26 RX ADMIN — PIPERACILLIN SODIUM AND TAZOBACTAM SODIUM SCH MLS/HR: 3; .375 INJECTION, POWDER, LYOPHILIZED, FOR SOLUTION INTRAVENOUS at 02:05

## 2017-07-26 RX ADMIN — METOPROLOL SUCCINATE SCH MG: 100 TABLET, FILM COATED, EXTENDED RELEASE ORAL at 08:55

## 2017-07-26 RX ADMIN — ALPRAZOLAM PRN MG: 0.5 TABLET ORAL at 18:00

## 2017-07-26 RX ADMIN — CALCIUM GLUCONATE SCH MLS/HR: 94 INJECTION, SOLUTION INTRAVENOUS at 03:30

## 2017-07-26 RX ADMIN — METOPROLOL SUCCINATE SCH MG: 50 TABLET, EXTENDED RELEASE ORAL at 20:53

## 2017-07-26 NOTE — RADRPT
Vent Rate: 89 bpm

RR Interval: 0 msec

KS Interval: 180 msec

QRS Duration: 84 msec

QT Interval: 382 msec

QTC Interval: 464 msec

P-R-T Axis: 56 - 2 - 51 degrees

 

 Normal sinus rhythm

 Normal ECG

 

Electronically Signed By: Bo Yanez

[id number]

## 2017-07-26 NOTE — PN
Date/Time of Note


Date/Time of Note


DATE: 7/26/17 


TIME: 15:56





Assessment/Plan


VTE Prophylaxis


VTE Prophylaxis Intervention:  SCD's





Lines/Catheters


IV Catheter Type (from Nrs):  Peripheral IV





Assessment/Plan


Chief Complaint/Hosp Course


Patient status post EGD yesterday, report is not available,  patient denies any 

nausea vomiting, states decrease in abdominal pain.


Assessment/Plan


- Epigastric pain associated with nausea going on for last 6 month.  Dr. Hitchcock 

is following in gastroenterology consultation. S/p EGD, report is not 

available.  Continue to follow-up gastroenterology recommendations.


- Intractable nausea and vomiting and admission, continue Zofran as needed.


- Possible diabetic gastroparesis, continue Reglan.


- Diabetes mellitus, hemoglobin A1c 7.8, continue Lantus and NovoLog.


- Hypertension, continue metoprolol and Norvasc


- Obesity with BMI of 37.6


Further recommendations based on clinical course.  Plan of care discussed with 

Dr. Maza.


Problems:  





Exam/Review of Systems


Vital Signs


Vitals





 Vital Signs








  Date Time  Temp Pulse Resp B/P Pulse Ox O2 Delivery O2 Flow Rate FiO2


 


7/26/17 14:00 98.2 82 16 183/77 97   


 


7/26/17 13:39      Room Air  


 


7/25/17 20:10       2.0 














 Intake and Output   


 


 7/25/17 7/25/17 7/26/17





 15:00 23:00 07:00


 


Intake Total 250 ml 1350 ml 3275 ml


 


Output Total   7 ml


 


Balance 250 ml 1350 ml 3268 ml











Exam


Constitutional:  alert, oriented


Neck:  supple


Respiratory:  normal air movement


Cardiovascular:  nl pulses


Gastrointestinal:  non-tender, soft


Extremities:  normal pulses


Neurological:  nl mental status





Results


Result Diagram:  


7/26/17 0505                                                                   

             7/26/17 0505





Results 24 hrs





Laboratory Tests








Test


  7/25/17


17:33 7/25/17


21:06 7/26/17


05:05 7/26/17


07:56


 


Bedside Glucose 160   157    80  


 


White Blood Count   9.4   


 


Red Blood Count   4.18  L 


 


Hemoglobin   12.6   


 


Hematocrit   38.0   


 


Mean Corpuscular Volume   90.9   


 


Mean Corpuscular Hemoglobin   30.1   


 


Mean Corpuscular Hemoglobin


Concent 


  


  33.2  


  


 


 


Red Cell Distribution Width   14.0   


 


Platelet Count   243   


 


Mean Platelet Volume   9.8   


 


Neutrophils %   61.8   


 


Lymphocytes %   30.7   


 


Monocytes %   6.0   


 


Eosinophils %   0.9   


 


Basophils %   0.3   


 


Nucleated Red Blood Cells %   0.0   


 


Neutrophils #   5.8   


 


Lymphocytes #   2.9   


 


Monocytes #   0.6   


 


Eosinophils #   0.1   


 


Basophils #   0.0   


 


Nucleated Red Blood Cells #   0.0   


 


Sodium Level   145  H 


 


Potassium Level   3.3  L 


 


Chloride Level   103   


 


Carbon Dioxide Level   29   


 


Anion Gap   16   


 


Blood Urea Nitrogen   10   


 


Creatinine   0.90   


 


Glucose Level   78  # 


 


Calcium Level   9.8   


 


Hepatitis B Surface Antigen   NEGATIVE   


 


Hepatitis B Core Total


Antibody 


  


  NEGATIVE  


  


 


 


Hepatitis C Antibody   NEGATIVE   














Test


  7/26/17


09:38 7/26/17


11:44 


  


 


 


Bedside Glucose 90   97    











Medications


Medications





 Current Medications


Ondansetron HCl (Zofran Inj) 4 mg Q6H  PRN IV NAUSEA AND/OR VOMITING Last 

administered on 7/24/17at 06:06; Admin Dose 4 MG;  Start 7/23/17 at 15:30


Miscellaneous Information 1 ea NOTE XX ;  Start 7/23/17 at 18:30


Glucose (Glutose) 15 gm Q15M  PRN PO DECREASED GLUCOSE;  Start 7/23/17 at 18:30


Glucose (Glutose) 22.5 gm Q15M  PRN PO DECREASED GLUCOSE;  Start 7/23/17 at 18:

30


Dextrose (D50w Syringe) 25 ml Q15M  PRN IV DECREASED GLUCOSE;  Start 7/23/17 at 

18:30


Dextrose (D50w Syringe) 50 ml Q15M  PRN IV DECREASED GLUCOSE;  Start 7/23/17 at 

18:30


Glucagon (Glucagen) 1 mg Q15M  PRN IM DECREASED GLUCOSE;  Start 7/23/17 at 18:30


Glucose (Glutose) 15 gm Q15M  PRN BUCCAL DECREASED GLUCOSE;  Start 7/23/17 at 18

:30


Ondansetron HCl (Zofran Inj) 4 mg Q6H  PRN IV NAUSEA AND/OR VOMITING;  Start 7/ 23/17 at 20:00


Morphine Sulfate (morphine) 2 mg Q6  PRN IV PAIN LEVEL 7-10 Last administered 

on 7/25/17at 17:47; Admin Dose 2 MG;  Start 7/23/17 at 20:00


Docusate Sodium (Colace) 200 mg BID PO  Last administered on 7/25/17at 21:06; 

Admin Dose 200 MG;  Start 7/23/17 at 21:00


Senna (Senokot) 2 tab BID PO  Last administered on 7/25/17at 21:07; Admin Dose 

2 TAB;  Start 7/23/17 at 21:00


Acetaminophen (Tylenol Tab) 650 mg Q6H  PRN PO PAIN AND OR ELEVATED TEMP Last 

administered on 7/23/17at 21:15; Admin Dose 650 MG;  Start 7/23/17 at 20:00


Cholecalciferol (Vitamin D) 2,000 unit DAILY PO  Last administered on 7/25/17at 

09:12; Admin Dose 2,000 UNIT;  Start 7/24/17 at 09:00


Metoprolol Succinate (Toprol Xl) 100 mg DAILY PO  Last administered on 7/26/ 17at 08:55; Admin Dose 100 MG;  Start 7/24/17 at 09:00


Zolpidem Tartrate (Ambien) 5 mg HS  PRN PO INSOMNIA Last administered on 7/25/ 17at 22:02; Admin Dose 5 MG;  Start 7/23/17 at 20:00


Amlodipine Besylate (Norvasc) 10 mg DAILY PO  Last administered on 7/26/17at 08:

55; Admin Dose 10 MG;  Start 7/24/17 at 09:00


Insulin Glargine (Lantus) 35 unit QPM SC  Last administered on 7/25/17at 21:08; 

Admin Dose 35 UNIT;  Start 7/23/17 at 21:00


Insulin Glargine (Lantus) 40 unit QAM SC  Last administered on 7/26/17at 10:53; 

Admin Dose 40 UNIT;  Start 7/24/17 at 09:00


Miscellaneous Information (* Miscellaneous Pharmacy Order)  ONCE XX ;  Start 7/ 23/17 at 20:00


Miscellaneous Information (* Miscellaneous Pharmacy Order)  ONCE XX ;  Start 7/ 23/17 at 20:00


Hydralazine HCl (Apresoline) 10 mg Q6H  PRN IV ELEVATED BLOOD PRESSURE Last 

administered on 7/24/17at 12:13; Admin Dose 10 MG;  Start 7/23/17 at 20:30


Alprazolam 0.5 mg 0.5 mg Q6H  PRN PO ANXIETY;  Start 7/25/17 at 16:00


Dextrose/Sodium Chloride (D5-1/2ns) 1,000 ml @  50 mls/hr Q20H IV  Last 

administered on 7/26/17at 10:52; Admin Dose 50 MLS/HR;  Start 7/26/17 at 10:30











ALIRIO MC Jul 26, 2017 15:58

## 2017-07-27 VITALS — DIASTOLIC BLOOD PRESSURE: 73 MMHG | SYSTOLIC BLOOD PRESSURE: 165 MMHG | RESPIRATION RATE: 16 BRPM

## 2017-07-27 VITALS — DIASTOLIC BLOOD PRESSURE: 74 MMHG | SYSTOLIC BLOOD PRESSURE: 162 MMHG | RESPIRATION RATE: 20 BRPM

## 2017-07-27 VITALS — SYSTOLIC BLOOD PRESSURE: 139 MMHG | HEART RATE: 72 BPM | DIASTOLIC BLOOD PRESSURE: 63 MMHG

## 2017-07-27 VITALS — SYSTOLIC BLOOD PRESSURE: 152 MMHG | DIASTOLIC BLOOD PRESSURE: 67 MMHG | RESPIRATION RATE: 16 BRPM

## 2017-07-27 VITALS — SYSTOLIC BLOOD PRESSURE: 191 MMHG | HEART RATE: 76 BPM | DIASTOLIC BLOOD PRESSURE: 72 MMHG

## 2017-07-27 VITALS — RESPIRATION RATE: 18 BRPM | SYSTOLIC BLOOD PRESSURE: 153 MMHG | DIASTOLIC BLOOD PRESSURE: 83 MMHG

## 2017-07-27 RX ADMIN — DEXAMETHASONE SODIUM PHOSPHATE PRN MG: 10 INJECTION, SOLUTION INTRAMUSCULAR; INTRAVENOUS at 17:23

## 2017-07-27 RX ADMIN — METOPROLOL SUCCINATE SCH MG: 50 TABLET, EXTENDED RELEASE ORAL at 08:08

## 2017-07-27 RX ADMIN — INSULIN GLARGINE SCH UNIT: 100 INJECTION, SOLUTION SUBCUTANEOUS at 20:49

## 2017-07-27 RX ADMIN — ZOLPIDEM TARTRATE PRN MG: 5 TABLET, FILM COATED ORAL at 20:48

## 2017-07-27 RX ADMIN — DOCUSATE SODIUM SCH MG: 100 CAPSULE, LIQUID FILLED ORAL at 20:47

## 2017-07-27 RX ADMIN — METOPROLOL SUCCINATE SCH MG: 50 TABLET, EXTENDED RELEASE ORAL at 20:47

## 2017-07-27 RX ADMIN — DOCUSATE SODIUM SCH MG: 100 CAPSULE, LIQUID FILLED ORAL at 08:09

## 2017-07-27 RX ADMIN — SENNOSIDES SCH TAB: 8.6 TABLET, FILM COATED ORAL at 08:09

## 2017-07-27 RX ADMIN — AMLODIPINE BESYLATE SCH MG: 10 TABLET ORAL at 08:08

## 2017-07-27 RX ADMIN — INSULIN GLARGINE SCH UNIT: 100 INJECTION, SOLUTION SUBCUTANEOUS at 08:10

## 2017-07-27 RX ADMIN — MORPHINE SULFATE PRN MG: 2 INJECTION, SOLUTION INTRAMUSCULAR; INTRAVENOUS at 17:23

## 2017-07-27 RX ADMIN — SENNOSIDES SCH TAB: 8.6 TABLET, FILM COATED ORAL at 20:47

## 2017-07-27 RX ADMIN — HYDRALAZINE HYDROCHLORIDE PRN MG: 20 INJECTION INTRAMUSCULAR; INTRAVENOUS at 21:55

## 2017-07-27 RX ADMIN — ALPRAZOLAM PRN MG: 0.5 TABLET ORAL at 11:23

## 2017-07-27 NOTE — CONS
Date/Time of Note


Date/Time of Note


DATE: 7/27/17 


TIME: 18:02





Assessment/Plan


Assessment/Plan


Chief Complaint/Hosp Course


Patient is 76-year-old female with history of diabetes mellitus, presented to 

the ER complaining of abdominal pain confined to the epigastric area associated 

with nausea and vomiting.  This pain is going on for last 6 months.  No 

radiation to any part of the body.  No chest pain no shortness of breath no GI 

bleeding no  or CNS problem.  No history of weight loss.  Patient had a CAT 

scan done which showed the irregular contour consistent with the diagnosis of 

possible cirrhosis.


Problems:  


Additional Assessment/Plan


Additional Assessment/Plan


1.  Epigastric pain associated with nausea going on for last 6 month


2.  Irregular surface of the liver probably nodular may have cirrhosis of 

liver.  Patient's alkaline phosphatase is elevated, hepatitis panel negative


3.  Diabetes mellitus


4.  Obesity


5.  Multiple gastric hyperplastic polyp


Plan


Start empirically on Reglan for possible diabetic gastroparesis


Hepatitis panel


Cirrhosis of liver might be due to Boles.  Patient may need antispasmodic 

medication





Consultation Date/Type/Reason


Admit Date/Time


Jul 25, 2017 at 16:00





24 HR Interval Summary


Free Text/Dictation


Patient complains of epigastric pain no nausea no vomiting.


Constitutional:  improved, no complaints





Exam/Review of Systems


Vital Signs


Vitals





 Vital Signs








  Date Time  Temp Pulse Resp B/P Pulse Ox O2 Delivery O2 Flow Rate FiO2


 


7/27/17 14:00 98.8 75 20 162/74 96   


 


7/26/17 22:30      Room Air  


 


7/25/17 20:10       2.0 














 Intake and Output   


 


 7/26/17 7/26/17 7/27/17





 15:00 23:00 07:00


 


Intake Total 50 ml 1480 ml 480 ml


 


Output Total 200 ml  


 


Balance -150 ml 1480 ml 480 ml











Exam


Constitutional:  alert, oriented, well developed


Psych:  nl mood/affect, no complaints


Head:  atraumatic, normocephalic


Eyes:  EOMI, PERRL, nl conjunctiva, nl lids, nl sclera


ENMT:  nl external ears & nose, nl lips & teeth, nl nasal mucosa & septum


Neck:  non-tender, supple


Respiratory:  clear to auscultation, normal air movement


Cardiovascular:  nl pulses, regular rate and rhythm


Gastrointestinal:  nl liver, spleen, non-tender, soft


Musculoskeletal:  nl extremities to inspection, nl gait and stance


Extremities:  normal pulses


Neurological:  CNS II-XII intact, nl mental status, nl speech, nl strength


Skin:  nl turgor, 


   No rash or lesions


Lymph:  nl lymph nodes





Results


Result Diagram:  


7/26/17 0505                                                                   

             7/26/17 0505





Results 24 hrs





Laboratory Tests








Test


  7/26/17


20:32 7/27/17


08:05 7/27/17


12:30 7/27/17


16:42


 


Bedside Glucose 322  H 128   163   121  











Medications


Medications





 Current Medications


Ondansetron HCl (Zofran Inj) 4 mg Q6H  PRN IV NAUSEA AND/OR VOMITING Last 

administered on 7/27/17at 17:23; Admin Dose 4 MG;  Start 7/23/17 at 15:30


Miscellaneous Information 1 ea NOTE XX ;  Start 7/23/17 at 18:30


Glucose (Glutose) 15 gm Q15M  PRN PO DECREASED GLUCOSE;  Start 7/23/17 at 18:30


Glucose (Glutose) 22.5 gm Q15M  PRN PO DECREASED GLUCOSE;  Start 7/23/17 at 18:

30


Dextrose (D50w Syringe) 25 ml Q15M  PRN IV DECREASED GLUCOSE;  Start 7/23/17 at 

18:30


Dextrose (D50w Syringe) 50 ml Q15M  PRN IV DECREASED GLUCOSE;  Start 7/23/17 at 

18:30


Glucagon (Glucagen) 1 mg Q15M  PRN IM DECREASED GLUCOSE;  Start 7/23/17 at 18:30


Glucose (Glutose) 15 gm Q15M  PRN BUCCAL DECREASED GLUCOSE;  Start 7/23/17 at 18

:30


Ondansetron HCl (Zofran Inj) 4 mg Q6H  PRN IV NAUSEA AND/OR VOMITING;  Start 7/ 23/17 at 20:00


Morphine Sulfate (morphine) 2 mg Q6  PRN IV PAIN LEVEL 7-10 Last administered 

on 7/27/17at 17:23; Admin Dose 2 MG;  Start 7/23/17 at 20:00


Docusate Sodium (Colace) 200 mg BID PO  Last administered on 7/27/17at 08:09; 

Admin Dose 200 MG;  Start 7/23/17 at 21:00


Senna (Senokot) 2 tab BID PO  Last administered on 7/27/17at 08:09; Admin Dose 

2 TAB;  Start 7/23/17 at 21:00


Acetaminophen (Tylenol Tab) 650 mg Q6H  PRN PO PAIN AND OR ELEVATED TEMP Last 

administered on 7/23/17at 21:15; Admin Dose 650 MG;  Start 7/23/17 at 20:00


Cholecalciferol (Vitamin D) 2,000 unit DAILY PO  Last administered on 7/27/17at 

08:09; Admin Dose 2,000 UNIT;  Start 7/24/17 at 09:00


Zolpidem Tartrate (Ambien) 5 mg HS  PRN PO INSOMNIA Last administered on 7/26/ 17at 20:59; Admin Dose 5 MG;  Start 7/23/17 at 20:00


Amlodipine Besylate (Norvasc) 10 mg DAILY PO  Last administered on 7/27/17at 08:

08; Admin Dose 10 MG;  Start 7/24/17 at 09:00


Insulin Glargine (Lantus) 35 unit QPM SC  Last administered on 7/26/17at 21:07; 

Admin Dose 35 UNIT;  Start 7/23/17 at 21:00


Insulin Glargine (Lantus) 40 unit QAM SC  Last administered on 7/27/17at 08:10; 

Admin Dose 40 UNIT;  Start 7/24/17 at 09:00


Miscellaneous Information (* Miscellaneous Pharmacy Order)  ONCE XX ;  Start 7/ 23/17 at 20:00


Miscellaneous Information (* Miscellaneous Pharmacy Order)  ONCE XX ;  Start 7/ 23/17 at 20:00


Hydralazine HCl (Apresoline) 10 mg Q6H  PRN IV ELEVATED BLOOD PRESSURE Last 

administered on 7/26/17at 16:51; Admin Dose 10 MG;  Start 7/23/17 at 20:30


Alprazolam (Xanax) 0.5 mg Q6H  PRN PO ANXIETY Last administered on 7/27/17at 11:

23; Admin Dose 0.5 MG;  Start 7/25/17 at 16:00


Metoprolol Succinate (Toprol Xl) 50 mg BID PO  Last administered on 7/27/17at 08

:08; Admin Dose 50 MG;  Start 7/26/17 at 21:00











ZULEYMA FERNANDO MD Jul 27, 2017 18:03

## 2017-07-27 NOTE — PN
Date/Time of Note


Date/Time of Note


DATE: 7/27/17 


TIME: 13:14





Assessment/Plan


VTE Prophylaxis


VTE Prophylaxis Intervention:  other





Lines/Catheters


IV Catheter Type (from Nrsg):  Saline Lock





Assessment/Plan


Chief Complaint/Hosp Course


- Epigastric pain associated with nausea going on for last 6 month.  Dr. Hitchcock 

is following in gastroenterology consultation. S/p EGD, report is not 

available.  Continue to follow-up gastroenterology recommendations.


- Intractable nausea and vomiting and admission, continue Zofran as needed.


- Possible diabetic gastroparesis, continue Reglan.


- Diabetes mellitus, hemoglobin A1c 7.8, continue Lantus and NovoLog.


- Hypertension, continue metoprolol and Norvasc


- Obesity with BMI of 37.6


Problems:  





Subjective


24 Hr Interval Summary


Free Text/Dictation


Patient complain of abdominal pain





Exam/Review of Systems


Vital Signs


Vitals





 Vital Signs








  Date Time  Temp Pulse Resp B/P Pulse Ox O2 Delivery O2 Flow Rate FiO2


 


7/27/17 08:00 98.6 100 18 153/83 98   


 


7/26/17 22:30      Room Air  


 


7/25/17 20:10       2.0 














 Intake and Output   


 


 7/26/17 7/26/17 7/27/17





 15:00 23:00 07:00


 


Intake Total 50 ml 1480 ml 480 ml


 


Output Total 200 ml  


 


Balance -150 ml 1480 ml 480 ml











Exam


Constitutional:  well developed


Head:  atraumatic, normocephalic


Neck:  supple


Respiratory:  clear to auscultation


Cardiovascular:  regular rate and rhythm


Gastrointestinal:  non-tender, soft


Extremities:  normal pulses





Results


Result Diagram:  


7/26/17 0505                                                                   

             7/26/17 0505





Results 24 hrs





Laboratory Tests








Test


  7/26/17


16:59 7/26/17


20:32 7/27/17


08:05 7/27/17


12:30


 


Bedside Glucose 116   322  H 128   163  











Medications


Medications





 Current Medications


Ondansetron HCl (Zofran Inj) 4 mg Q6H  PRN IV NAUSEA AND/OR VOMITING Last 

administered on 7/26/17at 20:55; Admin Dose 4 MG;  Start 7/23/17 at 15:30


Miscellaneous Information 1 ea NOTE XX ;  Start 7/23/17 at 18:30


Glucose (Glutose) 15 gm Q15M  PRN PO DECREASED GLUCOSE;  Start 7/23/17 at 18:30


Glucose (Glutose) 22.5 gm Q15M  PRN PO DECREASED GLUCOSE;  Start 7/23/17 at 18:

30


Dextrose (D50w Syringe) 25 ml Q15M  PRN IV DECREASED GLUCOSE;  Start 7/23/17 at 

18:30


Dextrose (D50w Syringe) 50 ml Q15M  PRN IV DECREASED GLUCOSE;  Start 7/23/17 at 

18:30


Glucagon (Glucagen) 1 mg Q15M  PRN IM DECREASED GLUCOSE;  Start 7/23/17 at 18:30


Glucose (Glutose) 15 gm Q15M  PRN BUCCAL DECREASED GLUCOSE;  Start 7/23/17 at 18

:30


Ondansetron HCl (Zofran Inj) 4 mg Q6H  PRN IV NAUSEA AND/OR VOMITING;  Start 7/ 23/17 at 20:00


Morphine Sulfate (morphine) 2 mg Q6  PRN IV PAIN LEVEL 7-10 Last administered 

on 7/25/17at 17:47; Admin Dose 2 MG;  Start 7/23/17 at 20:00


Docusate Sodium (Colace) 200 mg BID PO  Last administered on 7/27/17at 08:09; 

Admin Dose 200 MG;  Start 7/23/17 at 21:00


Senna (Senokot) 2 tab BID PO  Last administered on 7/27/17at 08:09; Admin Dose 

2 TAB;  Start 7/23/17 at 21:00


Acetaminophen (Tylenol Tab) 650 mg Q6H  PRN PO PAIN AND OR ELEVATED TEMP Last 

administered on 7/23/17at 21:15; Admin Dose 650 MG;  Start 7/23/17 at 20:00


Cholecalciferol (Vitamin D) 2,000 unit DAILY PO  Last administered on 7/27/17at 

08:09; Admin Dose 2,000 UNIT;  Start 7/24/17 at 09:00


Zolpidem Tartrate (Ambien) 5 mg HS  PRN PO INSOMNIA Last administered on 7/26/ 17at 20:59; Admin Dose 5 MG;  Start 7/23/17 at 20:00


Amlodipine Besylate (Norvasc) 10 mg DAILY PO  Last administered on 7/27/17at 08:

08; Admin Dose 10 MG;  Start 7/24/17 at 09:00


Insulin Glargine (Lantus) 35 unit QPM SC  Last administered on 7/26/17at 21:07; 

Admin Dose 35 UNIT;  Start 7/23/17 at 21:00


Insulin Glargine (Lantus) 40 unit QAM SC  Last administered on 7/27/17at 08:10; 

Admin Dose 40 UNIT;  Start 7/24/17 at 09:00


Miscellaneous Information (* Miscellaneous Pharmacy Order)  ONCE XX ;  Start 7/ 23/17 at 20:00


Miscellaneous Information (* Miscellaneous Pharmacy Order)  ONCE XX ;  Start 7/ 23/17 at 20:00


Hydralazine HCl (Apresoline) 10 mg Q6H  PRN IV ELEVATED BLOOD PRESSURE Last 

administered on 7/26/17at 16:51; Admin Dose 10 MG;  Start 7/23/17 at 20:30


Alprazolam (Xanax) 0.5 mg Q6H  PRN PO ANXIETY Last administered on 7/27/17at 11:

23; Admin Dose 0.5 MG;  Start 7/25/17 at 16:00


Metoprolol Succinate (Toprol Xl) 50 mg BID PO  Last administered on 7/27/17at 08

:08; Admin Dose 50 MG;  Start 7/26/17 at 21:00











ALISTAIR ADEN Jul 27, 2017 13:14

## 2017-07-28 VITALS — SYSTOLIC BLOOD PRESSURE: 164 MMHG | RESPIRATION RATE: 20 BRPM | DIASTOLIC BLOOD PRESSURE: 71 MMHG

## 2017-07-28 VITALS — DIASTOLIC BLOOD PRESSURE: 64 MMHG | HEART RATE: 80 BPM | SYSTOLIC BLOOD PRESSURE: 154 MMHG

## 2017-07-28 VITALS — DIASTOLIC BLOOD PRESSURE: 72 MMHG | SYSTOLIC BLOOD PRESSURE: 161 MMHG | RESPIRATION RATE: 20 BRPM

## 2017-07-28 VITALS — SYSTOLIC BLOOD PRESSURE: 126 MMHG | DIASTOLIC BLOOD PRESSURE: 59 MMHG | RESPIRATION RATE: 18 BRPM

## 2017-07-28 VITALS — RESPIRATION RATE: 16 BRPM | DIASTOLIC BLOOD PRESSURE: 72 MMHG | SYSTOLIC BLOOD PRESSURE: 166 MMHG

## 2017-07-28 RX ADMIN — DICYCLOMINE HYDROCHLORIDE SCH MG: 10 CAPSULE ORAL at 15:15

## 2017-07-28 RX ADMIN — INSULIN GLARGINE SCH UNIT: 100 INJECTION, SOLUTION SUBCUTANEOUS at 21:22

## 2017-07-28 RX ADMIN — INSULIN GLARGINE SCH UNIT: 100 INJECTION, SOLUTION SUBCUTANEOUS at 08:10

## 2017-07-28 RX ADMIN — METOPROLOL SUCCINATE SCH MG: 50 TABLET, EXTENDED RELEASE ORAL at 08:12

## 2017-07-28 RX ADMIN — DOCUSATE SODIUM SCH MG: 100 CAPSULE, LIQUID FILLED ORAL at 21:19

## 2017-07-28 RX ADMIN — ALPRAZOLAM PRN MG: 0.5 TABLET ORAL at 08:11

## 2017-07-28 RX ADMIN — AMLODIPINE BESYLATE SCH MG: 10 TABLET ORAL at 08:12

## 2017-07-28 RX ADMIN — SENNOSIDES SCH TAB: 8.6 TABLET, FILM COATED ORAL at 08:11

## 2017-07-28 RX ADMIN — DICYCLOMINE HYDROCHLORIDE SCH MG: 10 CAPSULE ORAL at 21:23

## 2017-07-28 RX ADMIN — METOPROLOL SUCCINATE SCH MG: 50 TABLET, EXTENDED RELEASE ORAL at 21:20

## 2017-07-28 RX ADMIN — DOCUSATE SODIUM SCH MG: 100 CAPSULE, LIQUID FILLED ORAL at 08:11

## 2017-07-28 RX ADMIN — MORPHINE SULFATE PRN MG: 2 INJECTION, SOLUTION INTRAMUSCULAR; INTRAVENOUS at 16:05

## 2017-07-28 RX ADMIN — ZOLPIDEM TARTRATE PRN MG: 5 TABLET, FILM COATED ORAL at 21:21

## 2017-07-28 RX ADMIN — SENNOSIDES SCH TAB: 8.6 TABLET, FILM COATED ORAL at 21:20

## 2017-07-28 NOTE — PN
Date/Time of Note


Date/Time of Note


DATE: 7/28/17 


TIME: 14:03





Assessment/Plan


VTE Prophylaxis


VTE Prophylaxis Intervention:  other





Lines/Catheters


IV Catheter Type (from Nrsg):  Saline Lock





Assessment/Plan


Chief Complaint/Hosp Course


- Epigastric pain associated with nausea going on for last 6 month.  Dr. Hitchcock 

is following in gastroenterology consultation. S/p EGD, report is not 

available.  Continue to follow-up gastroenterology recommendations.


- Intractable nausea and vomiting and admission, continue Zofran as needed.


- Possible diabetic gastroparesis, continue Reglan.


- Diabetes mellitus, hemoglobin A1c 7.8, continue Lantus and NovoLog.


- Hypertension, continue metoprolol and Norvasc


- Obesity with BMI of 37.6


Problems:  





Subjective


24 Hr Interval Summary


Free Text/Dictation


Patient complain of abdominal pain





Exam/Review of Systems


Vital Signs


Vitals





 Vital Signs








  Date Time  Temp Pulse Resp B/P Pulse Ox O2 Delivery O2 Flow Rate FiO2


 


7/28/17 08:00 98.6 75 20 164/71 96   


 


7/26/17 22:30      Room Air  


 


7/25/17 20:10       2.0 














 Intake and Output   


 


 7/27/17 7/27/17 7/28/17





 15:00 23:00 07:00


 


Intake Total  550 ml 500 ml


 


Balance  550 ml 500 ml











Exam


Constitutional:  well developed


Head:  atraumatic, normocephalic


Neck:  supple


Respiratory:  clear to auscultation


Cardiovascular:  regular rate and rhythm


Gastrointestinal:  non-tender, soft


Extremities:  normal pulses





Results


Result Diagram:  


7/26/17 0505                                                                   

             7/26/17 0505





Results 24 hrs





Laboratory Tests








Test


  7/27/17


16:42 7/27/17


20:46 7/28/17


08:06 7/28/17


12:10


 


Bedside Glucose 121   147   81   98  











Medications


Medications





 Current Medications


Ondansetron HCl (Zofran Inj) 4 mg Q6H  PRN IV NAUSEA AND/OR VOMITING Last 

administered on 7/27/17at 17:23; Admin Dose 4 MG;  Start 7/23/17 at 15:30


Miscellaneous Information 1 ea NOTE XX ;  Start 7/23/17 at 18:30


Glucose (Glutose) 15 gm Q15M  PRN PO DECREASED GLUCOSE;  Start 7/23/17 at 18:30


Glucose (Glutose) 22.5 gm Q15M  PRN PO DECREASED GLUCOSE;  Start 7/23/17 at 18:

30


Dextrose (D50w Syringe) 25 ml Q15M  PRN IV DECREASED GLUCOSE;  Start 7/23/17 at 

18:30


Dextrose (D50w Syringe) 50 ml Q15M  PRN IV DECREASED GLUCOSE;  Start 7/23/17 at 

18:30


Glucagon (Glucagen) 1 mg Q15M  PRN IM DECREASED GLUCOSE;  Start 7/23/17 at 18:30


Glucose (Glutose) 15 gm Q15M  PRN BUCCAL DECREASED GLUCOSE;  Start 7/23/17 at 18

:30


Ondansetron HCl (Zofran Inj) 4 mg Q6H  PRN IV NAUSEA AND/OR VOMITING;  Start 7/ 23/17 at 20:00


Morphine Sulfate (morphine) 2 mg Q6  PRN IV PAIN LEVEL 7-10 Last administered 

on 7/27/17at 17:23; Admin Dose 2 MG;  Start 7/23/17 at 20:00


Docusate Sodium (Colace) 200 mg BID PO  Last administered on 7/28/17at 08:11; 

Admin Dose 200 MG;  Start 7/23/17 at 21:00


Senna (Senokot) 2 tab BID PO  Last administered on 7/28/17at 08:11; Admin Dose 

2 TAB;  Start 7/23/17 at 21:00


Acetaminophen (Tylenol Tab) 650 mg Q6H  PRN PO PAIN AND OR ELEVATED TEMP Last 

administered on 7/23/17at 21:15; Admin Dose 650 MG;  Start 7/23/17 at 20:00


Cholecalciferol (Vitamin D) 2,000 unit DAILY PO  Last administered on 7/28/17at 

08:11; Admin Dose 2,000 UNIT;  Start 7/24/17 at 09:00


Zolpidem Tartrate (Ambien) 5 mg HS  PRN PO INSOMNIA Last administered on 7/27/ 17at 20:48; Admin Dose 5 MG;  Start 7/23/17 at 20:00


Amlodipine Besylate (Norvasc) 10 mg DAILY PO  Last administered on 7/28/17at 08:

12; Admin Dose 10 MG;  Start 7/24/17 at 09:00


Insulin Glargine (Lantus) 35 unit QPM SC  Last administered on 7/27/17at 20:49; 

Admin Dose 35 UNIT;  Start 7/23/17 at 21:00


Insulin Glargine (Lantus) 40 unit QAM SC  Last administered on 7/28/17at 08:10; 

Admin Dose 40 UNIT;  Start 7/24/17 at 09:00


Miscellaneous Information (* Miscellaneous Pharmacy Order)  ONCE XX ;  Start 7/ 23/17 at 20:00


Miscellaneous Information (* Miscellaneous Pharmacy Order)  ONCE XX ;  Start 7/ 23/17 at 20:00


Hydralazine HCl (Apresoline) 10 mg Q6H  PRN IV ELEVATED BLOOD PRESSURE Last 

administered on 7/27/17at 21:55; Admin Dose 10 MG;  Start 7/23/17 at 20:30


Alprazolam (Xanax) 0.5 mg Q6H  PRN PO ANXIETY Last administered on 7/28/17at 08:

11; Admin Dose 0.5 MG;  Start 7/25/17 at 16:00


Metoprolol Succinate (Toprol Xl) 50 mg BID PO  Last administered on 7/28/17at 08

:12; Admin Dose 50 MG;  Start 7/26/17 at 21:00


Dicyclomine HCl (Bentyl) 20 mg Q8 PO ;  Start 7/28/17 at 14:00











ALISTAIR ADEN Jul 28, 2017 14:03

## 2017-07-28 NOTE — CONS
Date/Time of Note


Date/Time of Note


DATE: 7/28/17 


TIME: 12:18





Assessment/Plan


Assessment/Plan


Chief Complaint/Hosp Course


Patient is 76-year-old female with history of diabetes mellitus, presented to 

the ER complaining of abdominal pain confined to the epigastric area associated 

with nausea and vomiting.  This pain is going on for last 6 months.  No 

radiation to any part of the body.  No chest pain no shortness of breath no GI 

bleeding no  or CNS problem.  No history of weight loss.  Patient had a CAT 

scan done which showed the irregular contour consistent with the diagnosis of 

possible cirrhosis.


Problems:  


Additional Assessment/Plan


Additional Assessment/Plan


1.  Epigastric pain associated with nausea going on for last 6 month


2.  Irregular surface of the liver probably nodular may have cirrhosis of 

liver.  Patient's alkaline phosphatase is elevated, hepatitis panel negative


3.  Diabetes mellitus


4.  Obesity


5.  Multiple gastric hyperplastic polyp


Plan


Start empirically on Reglan for possible diabetic gastroparesis


Hepatitis panel


Cirrhosis of liver might be due to Boles.  Patient may need antispasmodic 

medication


Bentyl for the pain





Consultation Date/Type/Reason


Admit Date/Time


Jul 25, 2017 at 16:00





24 HR Interval Summary


Free Text/Dictation


Still complains of mild epigastric discomfort


Constitutional:  improved





Exam/Review of Systems


Vital Signs


Vitals





 Vital Signs








  Date Time  Temp Pulse Resp B/P Pulse Ox O2 Delivery O2 Flow Rate FiO2


 


7/28/17 08:00 98.6 75 20 164/71 96   


 


7/26/17 22:30      Room Air  


 


7/25/17 20:10       2.0 














 Intake and Output   


 


 7/27/17 7/27/17 7/28/17





 15:00 23:00 07:00


 


Intake Total  550 ml 500 ml


 


Balance  550 ml 500 ml











Exam


Constitutional:  alert, oriented, well developed


Psych:  nl mood/affect, no complaints


Head:  atraumatic, normocephalic


Eyes:  EOMI, PERRL, nl conjunctiva, nl lids, nl sclera


ENMT:  nl external ears & nose, nl lips & teeth, nl nasal mucosa & septum


Neck:  non-tender, supple


Respiratory:  clear to auscultation, normal air movement


Cardiovascular:  nl pulses, regular rate and rhythm


Gastrointestinal:  nl liver, spleen, non-tender, soft


Musculoskeletal:  nl extremities to inspection, nl gait and stance


Extremities:  normal pulses


Neurological:  CNS II-XII intact, nl mental status, nl speech, nl strength


Skin:  nl turgor, 


   No rash or lesions


Lymph:  nl lymph nodes





Results


Result Diagram:  


7/26/17 0505                                                                   

             7/26/17 0505





Results 24 hrs





Laboratory Tests








Test


  7/27/17


12:30 7/27/17


16:42 7/27/17


20:46 7/28/17


08:06


 


Bedside Glucose 163   121   147   81  











Medications


Medications





 Current Medications


Ondansetron HCl (Zofran Inj) 4 mg Q6H  PRN IV NAUSEA AND/OR VOMITING Last 

administered on 7/27/17at 17:23; Admin Dose 4 MG;  Start 7/23/17 at 15:30


Miscellaneous Information 1 ea NOTE XX ;  Start 7/23/17 at 18:30


Glucose (Glutose) 15 gm Q15M  PRN PO DECREASED GLUCOSE;  Start 7/23/17 at 18:30


Glucose (Glutose) 22.5 gm Q15M  PRN PO DECREASED GLUCOSE;  Start 7/23/17 at 18:

30


Dextrose (D50w Syringe) 25 ml Q15M  PRN IV DECREASED GLUCOSE;  Start 7/23/17 at 

18:30


Dextrose (D50w Syringe) 50 ml Q15M  PRN IV DECREASED GLUCOSE;  Start 7/23/17 at 

18:30


Glucagon (Glucagen) 1 mg Q15M  PRN IM DECREASED GLUCOSE;  Start 7/23/17 at 18:30


Glucose (Glutose) 15 gm Q15M  PRN BUCCAL DECREASED GLUCOSE;  Start 7/23/17 at 18

:30


Ondansetron HCl (Zofran Inj) 4 mg Q6H  PRN IV NAUSEA AND/OR VOMITING;  Start 7/ 23/17 at 20:00


Morphine Sulfate (morphine) 2 mg Q6  PRN IV PAIN LEVEL 7-10 Last administered 

on 7/27/17at 17:23; Admin Dose 2 MG;  Start 7/23/17 at 20:00


Docusate Sodium (Colace) 200 mg BID PO  Last administered on 7/28/17at 08:11; 

Admin Dose 200 MG;  Start 7/23/17 at 21:00


Senna (Senokot) 2 tab BID PO  Last administered on 7/28/17at 08:11; Admin Dose 

2 TAB;  Start 7/23/17 at 21:00


Acetaminophen (Tylenol Tab) 650 mg Q6H  PRN PO PAIN AND OR ELEVATED TEMP Last 

administered on 7/23/17at 21:15; Admin Dose 650 MG;  Start 7/23/17 at 20:00


Cholecalciferol (Vitamin D) 2,000 unit DAILY PO  Last administered on 7/28/17at 

08:11; Admin Dose 2,000 UNIT;  Start 7/24/17 at 09:00


Zolpidem Tartrate (Ambien) 5 mg HS  PRN PO INSOMNIA Last administered on 7/27/ 17at 20:48; Admin Dose 5 MG;  Start 7/23/17 at 20:00


Amlodipine Besylate (Norvasc) 10 mg DAILY PO  Last administered on 7/28/17at 08:

12; Admin Dose 10 MG;  Start 7/24/17 at 09:00


Insulin Glargine (Lantus) 35 unit QPM SC  Last administered on 7/27/17at 20:49; 

Admin Dose 35 UNIT;  Start 7/23/17 at 21:00


Insulin Glargine (Lantus) 40 unit QAM SC  Last administered on 7/28/17at 08:10; 

Admin Dose 40 UNIT;  Start 7/24/17 at 09:00


Miscellaneous Information (* Miscellaneous Pharmacy Order)  ONCE XX ;  Start 7/ 23/17 at 20:00


Miscellaneous Information (* Miscellaneous Pharmacy Order)  ONCE XX ;  Start 7/ 23/17 at 20:00


Hydralazine HCl (Apresoline) 10 mg Q6H  PRN IV ELEVATED BLOOD PRESSURE Last 

administered on 7/27/17at 21:55; Admin Dose 10 MG;  Start 7/23/17 at 20:30


Alprazolam (Xanax) 0.5 mg Q6H  PRN PO ANXIETY Last administered on 7/28/17at 08:

11; Admin Dose 0.5 MG;  Start 7/25/17 at 16:00


Metoprolol Succinate (Toprol Xl) 50 mg BID PO  Last administered on 7/28/17at 08

:12; Admin Dose 50 MG;  Start 7/26/17 at 21:00











ZULEYMA FERNANDO MD Jul 28, 2017 12:19

## 2017-07-29 VITALS — DIASTOLIC BLOOD PRESSURE: 79 MMHG | HEART RATE: 70 BPM | SYSTOLIC BLOOD PRESSURE: 176 MMHG

## 2017-07-29 VITALS — RESPIRATION RATE: 17 BRPM | SYSTOLIC BLOOD PRESSURE: 145 MMHG | DIASTOLIC BLOOD PRESSURE: 63 MMHG

## 2017-07-29 VITALS — SYSTOLIC BLOOD PRESSURE: 160 MMHG | DIASTOLIC BLOOD PRESSURE: 88 MMHG | RESPIRATION RATE: 18 BRPM

## 2017-07-29 VITALS — DIASTOLIC BLOOD PRESSURE: 79 MMHG | RESPIRATION RATE: 20 BRPM | SYSTOLIC BLOOD PRESSURE: 179 MMHG

## 2017-07-29 VITALS — DIASTOLIC BLOOD PRESSURE: 82 MMHG | SYSTOLIC BLOOD PRESSURE: 152 MMHG | RESPIRATION RATE: 18 BRPM

## 2017-07-29 LAB
ANION GAP SERPL CALC-SCNC: 19 MMOL/L (ref 8–16)
BASOPHILS # BLD AUTO: 0 10^3/UL (ref 0–0.1)
BASOPHILS NFR BLD: 0.3 % (ref 0–2)
BUN SERPL-MCNC: 18 MG/DL (ref 7–20)
CALCIUM SERPL-MCNC: 10.6 MG/DL (ref 8.4–10.2)
CHLORIDE SERPL-SCNC: 103 MMOL/L (ref 97–110)
CO2 SERPL-SCNC: 27 MMOL/L (ref 21–31)
CREAT SERPL-MCNC: 1.01 MG/DL (ref 0.44–1)
EOSINOPHIL # BLD: 0.2 10^3/UL (ref 0–0.5)
EOSINOPHIL NFR BLD: 1.5 % (ref 0–7)
ERYTHROCYTE [DISTWIDTH] IN BLOOD BY AUTOMATED COUNT: 14.5 % (ref 11.5–14.5)
GLUCOSE SERPL-MCNC: 59 MG/DL (ref 70–220)
HCT VFR BLD CALC: 40.9 % (ref 37–47)
HGB BLD-MCNC: 13.2 G/DL (ref 12–16)
LYMPHOCYTES # BLD AUTO: 4.3 10^3/UL (ref 0.8–2.9)
LYMPHOCYTES NFR BLD AUTO: 37.4 % (ref 15–51)
MCH RBC QN AUTO: 29.9 PG (ref 29–33)
MCHC RBC AUTO-ENTMCNC: 32.3 G/DL (ref 32–37)
MCV RBC AUTO: 92.7 FL (ref 82–101)
MONOCYTES # BLD: 0.6 10^3/UL (ref 0.3–0.9)
MONOCYTES NFR BLD: 4.9 % (ref 0–11)
NEUTROPHILS # BLD: 6.4 10^3/UL (ref 1.6–7.5)
NEUTROPHILS NFR BLD AUTO: 55.5 % (ref 39–77)
NRBC # BLD MANUAL: 0 10^3/UL (ref 0–0)
NRBC BLD AUTO-RTO: 0 /100WBC (ref 0–0)
PLATELET # BLD: 294 10^3/UL (ref 140–415)
PMV BLD AUTO: 10.2 FL (ref 7.4–10.4)
POTASSIUM SERPL-SCNC: 3.8 MMOL/L (ref 3.5–5.1)
RBC # BLD AUTO: 4.41 10^6/UL (ref 4.2–5.4)
SODIUM SERPL-SCNC: 145 MMOL/L (ref 135–144)
WBC # BLD AUTO: 11.5 10^3/UL (ref 4.8–10.8)

## 2017-07-29 RX ADMIN — DICYCLOMINE HYDROCHLORIDE SCH MG: 10 CAPSULE ORAL at 14:00

## 2017-07-29 RX ADMIN — DEXAMETHASONE SODIUM PHOSPHATE PRN MG: 10 INJECTION, SOLUTION INTRAMUSCULAR; INTRAVENOUS at 13:55

## 2017-07-29 RX ADMIN — HYDRALAZINE HYDROCHLORIDE PRN MG: 20 INJECTION INTRAMUSCULAR; INTRAVENOUS at 22:33

## 2017-07-29 RX ADMIN — AMLODIPINE BESYLATE SCH MG: 10 TABLET ORAL at 08:05

## 2017-07-29 RX ADMIN — METOPROLOL SUCCINATE SCH MG: 50 TABLET, EXTENDED RELEASE ORAL at 20:08

## 2017-07-29 RX ADMIN — MORPHINE SULFATE PRN MG: 2 INJECTION, SOLUTION INTRAMUSCULAR; INTRAVENOUS at 13:55

## 2017-07-29 RX ADMIN — METOPROLOL SUCCINATE SCH MG: 50 TABLET, EXTENDED RELEASE ORAL at 08:05

## 2017-07-29 RX ADMIN — SENNOSIDES SCH TAB: 8.6 TABLET, FILM COATED ORAL at 08:04

## 2017-07-29 RX ADMIN — DOCUSATE SODIUM SCH MG: 100 CAPSULE, LIQUID FILLED ORAL at 08:04

## 2017-07-29 RX ADMIN — INSULIN GLARGINE SCH UNIT: 100 INJECTION, SOLUTION SUBCUTANEOUS at 08:01

## 2017-07-29 RX ADMIN — SENNOSIDES SCH TAB: 8.6 TABLET, FILM COATED ORAL at 20:07

## 2017-07-29 RX ADMIN — DICYCLOMINE HYDROCHLORIDE SCH MG: 10 CAPSULE ORAL at 05:42

## 2017-07-29 RX ADMIN — ZOLPIDEM TARTRATE PRN MG: 5 TABLET, FILM COATED ORAL at 21:13

## 2017-07-29 RX ADMIN — DOCUSATE SODIUM SCH MG: 100 CAPSULE, LIQUID FILLED ORAL at 20:06

## 2017-07-29 RX ADMIN — INSULIN GLARGINE SCH UNIT: 100 INJECTION, SOLUTION SUBCUTANEOUS at 21:12

## 2017-07-29 RX ADMIN — DICYCLOMINE HYDROCHLORIDE SCH MG: 10 CAPSULE ORAL at 21:13

## 2017-07-29 NOTE — PN
Date/Time of Note


Date/Time of Note


DATE: 7/29/17 


TIME: 11:42





Assessment/Plan


VTE Prophylaxis


VTE Prophylaxis Intervention:  other





Lines/Catheters


IV Catheter Type (from Nrsg):  Saline Lock





Assessment/Plan


Chief Complaint/Hosp Course


- Epigastric pain associated with nausea going on for last 6 month.  Dr. Hitchcock 

is following in gastroenterology consultation. S/p EGD, report is not 

available.  Continue to follow-up gastroenterology recommendations.


- Intractable nausea and vomiting and admission, continue Zofran as needed.


- Possible diabetic gastroparesis, continue Reglan.


- Diabetes mellitus, hemoglobin A1c 7.8, continue Lantus and NovoLog.


- Hypertension, continue metoprolol and Norvasc


- Obesity with BMI of 37.6


Problems:  





Subjective


24 Hr Interval Summary


Free Text/Dictation


Patient has no complaints





Exam/Review of Systems


Vital Signs


Vitals





 Vital Signs








  Date Time  Temp Pulse Resp B/P Pulse Ox O2 Delivery O2 Flow Rate FiO2


 


7/29/17 08:00 97.8 78 18 160/88 96   


 


7/26/17 22:30      Room Air  


 


7/25/17 20:10       2.0 














 Intake and Output   


 


 7/28/17 7/28/17 7/29/17





 15:00 23:00 07:00


 


Intake Total  920 ml 250 ml


 


Balance  920 ml 250 ml











Exam


Constitutional:  well developed


Head:  atraumatic, normocephalic


Neck:  supple


Respiratory:  clear to auscultation


Cardiovascular:  regular rate and rhythm


Gastrointestinal:  non-tender, soft


Extremities:  normal pulses





Results


Result Diagram:  


7/29/17 0520                                                                   

             7/29/17 0520





Results 24 hrs





Laboratory Tests








Test


  7/28/17


12:10 7/28/17


17:08 7/28/17


21:17 7/29/17


05:20


 


Bedside Glucose 98   150   142   


 


White Blood Count    11.5  #H


 


Red Blood Count    4.41  


 


Hemoglobin    13.2  


 


Hematocrit    40.9  


 


Mean Corpuscular Volume    92.7  


 


Mean Corpuscular Hemoglobin    29.9  


 


Mean Corpuscular Hemoglobin


Concent 


  


  


  32.3  


 


 


Red Cell Distribution Width    14.5  


 


Platelet Count    294  #


 


Mean Platelet Volume    10.2  


 


Neutrophils %    55.5  


 


Lymphocytes %    37.4  


 


Monocytes %    4.9  


 


Eosinophils %    1.5  


 


Basophils %    0.3  


 


Nucleated Red Blood Cells %    0.0  


 


Neutrophils #    6.4  


 


Lymphocytes #    4.3  H


 


Monocytes #    0.6  


 


Eosinophils #    0.2  


 


Basophils #    0.0  


 


Nucleated Red Blood Cells #    0.0  


 


Sodium Level    145  H


 


Potassium Level    3.8  


 


Chloride Level    103  


 


Carbon Dioxide Level    27  


 


Anion Gap    19  H


 


Blood Urea Nitrogen    18  


 


Creatinine    1.01  H


 


Glucose Level    59  L


 


Calcium Level    10.6  H














Test


  7/29/17


07:59 


  


  


 


 


Bedside Glucose 89     











Medications


Medications





 Current Medications


Ondansetron HCl (Zofran Inj) 4 mg Q6H  PRN IV NAUSEA AND/OR VOMITING Last 

administered on 7/27/17at 17:23; Admin Dose 4 MG;  Start 7/23/17 at 15:30


Miscellaneous Information 1 ea NOTE XX ;  Start 7/23/17 at 18:30


Glucose (Glutose) 15 gm Q15M  PRN PO DECREASED GLUCOSE;  Start 7/23/17 at 18:30


Glucose (Glutose) 22.5 gm Q15M  PRN PO DECREASED GLUCOSE;  Start 7/23/17 at 18:

30


Dextrose (D50w Syringe) 25 ml Q15M  PRN IV DECREASED GLUCOSE;  Start 7/23/17 at 

18:30


Dextrose (D50w Syringe) 50 ml Q15M  PRN IV DECREASED GLUCOSE;  Start 7/23/17 at 

18:30


Glucagon (Glucagen) 1 mg Q15M  PRN IM DECREASED GLUCOSE;  Start 7/23/17 at 18:30


Glucose (Glutose) 15 gm Q15M  PRN BUCCAL DECREASED GLUCOSE;  Start 7/23/17 at 18

:30


Ondansetron HCl (Zofran Inj) 4 mg Q6H  PRN IV NAUSEA AND/OR VOMITING;  Start 7/ 23/17 at 20:00


Morphine Sulfate (morphine) 2 mg Q6  PRN IV PAIN LEVEL 7-10 Last administered 

on 7/28/17at 16:05; Admin Dose 2 MG;  Start 7/23/17 at 20:00


Docusate Sodium (Colace) 200 mg BID PO  Last administered on 7/29/17at 08:04; 

Admin Dose 200 MG;  Start 7/23/17 at 21:00


Senna (Senokot) 2 tab BID PO  Last administered on 7/29/17at 08:04; Admin Dose 

2 TAB;  Start 7/23/17 at 21:00


Acetaminophen (Tylenol Tab) 650 mg Q6H  PRN PO PAIN AND OR ELEVATED TEMP Last 

administered on 7/23/17at 21:15; Admin Dose 650 MG;  Start 7/23/17 at 20:00


Cholecalciferol (Vitamin D) 2,000 unit DAILY PO  Last administered on 7/29/17at 

08:04; Admin Dose 2,000 UNIT;  Start 7/24/17 at 09:00


Zolpidem Tartrate (Ambien) 5 mg HS  PRN PO INSOMNIA Last administered on 7/28/ 17at 21:21; Admin Dose 5 MG;  Start 7/23/17 at 20:00


Amlodipine Besylate (Norvasc) 10 mg DAILY PO  Last administered on 7/29/17at 08:

05; Admin Dose 10 MG;  Start 7/24/17 at 09:00


Insulin Glargine (Lantus) 35 unit QPM SC  Last administered on 7/28/17at 21:22; 

Admin Dose 35 UNIT;  Start 7/23/17 at 21:00


Insulin Glargine (Lantus) 40 unit QAM SC  Last administered on 7/29/17at 08:01; 

Admin Dose 40 UNIT;  Start 7/24/17 at 09:00


Miscellaneous Information (* Miscellaneous Pharmacy Order)  ONCE XX ;  Start 7/ 23/17 at 20:00


Miscellaneous Information (* Miscellaneous Pharmacy Order)  ONCE XX ;  Start 7/ 23/17 at 20:00


Hydralazine HCl (Apresoline) 10 mg Q6H  PRN IV ELEVATED BLOOD PRESSURE Last 

administered on 7/27/17at 21:55; Admin Dose 10 MG;  Start 7/23/17 at 20:30


Alprazolam (Xanax) 0.5 mg Q6H  PRN PO ANXIETY Last administered on 7/28/17at 08:

11; Admin Dose 0.5 MG;  Start 7/25/17 at 16:00


Metoprolol Succinate (Toprol Xl) 50 mg BID PO  Last administered on 7/29/17at 08

:05; Admin Dose 50 MG;  Start 7/26/17 at 21:00


Dicyclomine HCl (Bentyl) 20 mg Q8 PO  Last administered on 7/29/17at 05:42; 

Admin Dose 20 MG;  Start 7/28/17 at 14:00











ALISTAIR ADEN Jul 29, 2017 11:42

## 2017-07-29 NOTE — CONS
Date/Time of Note


Date/Time of Note


DATE: 7/29/17 


TIME: 12:22





Assessment/Plan


Assessment/Plan


Chief Complaint/Hosp Course


Patient is 76-year-old female with history of diabetes mellitus, presented to 

the ER complaining of abdominal pain confined to the epigastric area associated 

with nausea and vomiting.  This pain is going on for last 6 months.  No 

radiation to any part of the body.  No chest pain no shortness of breath no GI 

bleeding no  or CNS problem.  No history of weight loss.  Patient had a CAT 

scan done which showed the irregular contour consistent with the diagnosis of 

possible cirrhosis.


Problems:  


Additional Assessment/Plan


Additional Assessment/Plan


1.  Epigastric pain associated with nausea going on for last 6 month, resolved


2.  Irregular surface of the liver probably nodular may have cirrhosis of 

liver.  Patient's alkaline phosphatase is elevated, hepatitis panel negative


3.  Diabetes mellitus


4.  Obesity


5.  Multiple gastric hyperplastic polyp


Plan


Start empirically on Reglan for possible diabetic gastroparesis


Hepatitis panel


Cirrhosis of liver might be due to [place].  Patient may need antispasmodic 

medication


Bentyl for the pain





Consultation Date/Type/Reason


Admit Date/Time


Jul 25, 2017 at 16:00





24 HR Interval Summary


Constitutional:  improved, no complaints





Exam/Review of Systems


Vital Signs


Vitals





 Vital Signs








  Date Time  Temp Pulse Resp B/P Pulse Ox O2 Delivery O2 Flow Rate FiO2


 


7/29/17 08:00 97.8 78 18 160/88 96   


 


7/26/17 22:30      Room Air  


 


7/25/17 20:10       2.0 














 Intake and Output   


 


 7/28/17 7/28/17 7/29/17





 15:00 23:00 07:00


 


Intake Total  920 ml 250 ml


 


Balance  920 ml 250 ml











Exam


Constitutional:  alert, oriented, well developed


Psych:  nl mood/affect, no complaints


Head:  atraumatic, normocephalic


Eyes:  EOMI, PERRL, nl conjunctiva, nl lids, nl sclera


ENMT:  nl external ears & nose, nl lips & teeth, nl nasal mucosa & septum


Neck:  non-tender, supple


Respiratory:  clear to auscultation, normal air movement


Cardiovascular:  nl pulses, regular rate and rhythm


Gastrointestinal:  nl liver, spleen, non-tender, soft


Musculoskeletal:  nl extremities to inspection, nl gait and stance


Extremities:  normal pulses


Neurological:  CNS II-XII intact, nl mental status, nl speech, nl strength


Skin:  nl turgor, 


   No rash or lesions


Lymph:  nl lymph nodes





Results


Result Diagram:  


7/29/17 0520                                                                   

             7/29/17 0520





Results 24 hrs





Laboratory Tests








Test


  7/28/17


17:08 7/28/17


21:17 7/29/17


05:20 7/29/17


07:59


 


Bedside Glucose 150   142    89  


 


White Blood Count   11.5  #H 


 


Red Blood Count   4.41   


 


Hemoglobin   13.2   


 


Hematocrit   40.9   


 


Mean Corpuscular Volume   92.7   


 


Mean Corpuscular Hemoglobin   29.9   


 


Mean Corpuscular Hemoglobin


Concent 


  


  32.3  


  


 


 


Red Cell Distribution Width   14.5   


 


Platelet Count   294  # 


 


Mean Platelet Volume   10.2   


 


Neutrophils %   55.5   


 


Lymphocytes %   37.4   


 


Monocytes %   4.9   


 


Eosinophils %   1.5   


 


Basophils %   0.3   


 


Nucleated Red Blood Cells %   0.0   


 


Neutrophils #   6.4   


 


Lymphocytes #   4.3  H 


 


Monocytes #   0.6   


 


Eosinophils #   0.2   


 


Basophils #   0.0   


 


Nucleated Red Blood Cells #   0.0   


 


Sodium Level   145  H 


 


Potassium Level   3.8   


 


Chloride Level   103   


 


Carbon Dioxide Level   27   


 


Anion Gap   19  H 


 


Blood Urea Nitrogen   18   


 


Creatinine   1.01  H 


 


Glucose Level   59  L 


 


Calcium Level   10.6  H 














Test


  7/29/17


11:51 


  


  


 


 


Bedside Glucose 191     











Medications


Medications





 Current Medications


Ondansetron HCl (Zofran Inj) 4 mg Q6H  PRN IV NAUSEA AND/OR VOMITING Last 

administered on 7/27/17at 17:23; Admin Dose 4 MG;  Start 7/23/17 at 15:30


Miscellaneous Information 1 ea NOTE XX ;  Start 7/23/17 at 18:30


Glucose (Glutose) 15 gm Q15M  PRN PO DECREASED GLUCOSE;  Start 7/23/17 at 18:30


Glucose (Glutose) 22.5 gm Q15M  PRN PO DECREASED GLUCOSE;  Start 7/23/17 at 18:

30


Dextrose (D50w Syringe) 25 ml Q15M  PRN IV DECREASED GLUCOSE;  Start 7/23/17 at 

18:30


Dextrose (D50w Syringe) 50 ml Q15M  PRN IV DECREASED GLUCOSE;  Start 7/23/17 at 

18:30


Glucagon (Glucagen) 1 mg Q15M  PRN IM DECREASED GLUCOSE;  Start 7/23/17 at 18:30


Glucose (Glutose) 15 gm Q15M  PRN BUCCAL DECREASED GLUCOSE;  Start 7/23/17 at 18

:30


Ondansetron HCl (Zofran Inj) 4 mg Q6H  PRN IV NAUSEA AND/OR VOMITING;  Start 7/ 23/17 at 20:00


Morphine Sulfate (morphine) 2 mg Q6  PRN IV PAIN LEVEL 7-10 Last administered 

on 7/28/17at 16:05; Admin Dose 2 MG;  Start 7/23/17 at 20:00


Docusate Sodium (Colace) 200 mg BID PO  Last administered on 7/29/17at 08:04; 

Admin Dose 200 MG;  Start 7/23/17 at 21:00


Senna (Senokot) 2 tab BID PO  Last administered on 7/29/17at 08:04; Admin Dose 

2 TAB;  Start 7/23/17 at 21:00


Acetaminophen (Tylenol Tab) 650 mg Q6H  PRN PO PAIN AND OR ELEVATED TEMP Last 

administered on 7/23/17at 21:15; Admin Dose 650 MG;  Start 7/23/17 at 20:00


Cholecalciferol (Vitamin D) 2,000 unit DAILY PO  Last administered on 7/29/17at 

08:04; Admin Dose 2,000 UNIT;  Start 7/24/17 at 09:00


Zolpidem Tartrate (Ambien) 5 mg HS  PRN PO INSOMNIA Last administered on 7/28/ 17at 21:21; Admin Dose 5 MG;  Start 7/23/17 at 20:00


Amlodipine Besylate (Norvasc) 10 mg DAILY PO  Last administered on 7/29/17at 08:

05; Admin Dose 10 MG;  Start 7/24/17 at 09:00


Insulin Glargine (Lantus) 35 unit QPM SC  Last administered on 7/28/17at 21:22; 

Admin Dose 35 UNIT;  Start 7/23/17 at 21:00


Insulin Glargine (Lantus) 40 unit QAM SC  Last administered on 7/29/17at 08:01; 

Admin Dose 40 UNIT;  Start 7/24/17 at 09:00


Miscellaneous Information (* Miscellaneous Pharmacy Order)  ONCE XX ;  Start 7/ 23/17 at 20:00


Miscellaneous Information (* Miscellaneous Pharmacy Order)  ONCE XX ;  Start 7/ 23/17 at 20:00


Hydralazine HCl (Apresoline) 10 mg Q6H  PRN IV ELEVATED BLOOD PRESSURE Last 

administered on 7/27/17at 21:55; Admin Dose 10 MG;  Start 7/23/17 at 20:30


Alprazolam (Xanax) 0.5 mg Q6H  PRN PO ANXIETY Last administered on 7/28/17at 08:

11; Admin Dose 0.5 MG;  Start 7/25/17 at 16:00


Metoprolol Succinate (Toprol Xl) 50 mg BID PO  Last administered on 7/29/17at 08

:05; Admin Dose 50 MG;  Start 7/26/17 at 21:00


Dicyclomine HCl (Bentyl) 20 mg Q8 PO  Last administered on 7/29/17at 05:42; 

Admin Dose 20 MG;  Start 7/28/17 at 14:00











ZULEYMA FERNANDO MD Jul 29, 2017 12:23

## 2017-07-30 VITALS — RESPIRATION RATE: 18 BRPM | DIASTOLIC BLOOD PRESSURE: 74 MMHG | SYSTOLIC BLOOD PRESSURE: 174 MMHG

## 2017-07-30 VITALS — DIASTOLIC BLOOD PRESSURE: 65 MMHG | SYSTOLIC BLOOD PRESSURE: 147 MMHG | RESPIRATION RATE: 20 BRPM

## 2017-07-30 VITALS — HEART RATE: 75 BPM | SYSTOLIC BLOOD PRESSURE: 160 MMHG | DIASTOLIC BLOOD PRESSURE: 71 MMHG

## 2017-07-30 VITALS — SYSTOLIC BLOOD PRESSURE: 143 MMHG | DIASTOLIC BLOOD PRESSURE: 65 MMHG | HEART RATE: 75 BPM | RESPIRATION RATE: 18 BRPM

## 2017-07-30 VITALS — RESPIRATION RATE: 19 BRPM | DIASTOLIC BLOOD PRESSURE: 60 MMHG | SYSTOLIC BLOOD PRESSURE: 127 MMHG

## 2017-07-30 LAB
ANION GAP SERPL CALC-SCNC: 16 MMOL/L (ref 8–16)
BASOPHILS # BLD AUTO: 0 10^3/UL (ref 0–0.1)
BASOPHILS NFR BLD: 0.2 % (ref 0–2)
BUN SERPL-MCNC: 12 MG/DL (ref 7–20)
CALCIUM SERPL-MCNC: 10.3 MG/DL (ref 8.4–10.2)
CHLORIDE SERPL-SCNC: 104 MMOL/L (ref 97–110)
CO2 SERPL-SCNC: 27 MMOL/L (ref 21–31)
CREAT SERPL-MCNC: 0.81 MG/DL (ref 0.44–1)
EOSINOPHIL # BLD: 0.1 10^3/UL (ref 0–0.5)
EOSINOPHIL NFR BLD: 1.5 % (ref 0–7)
ERYTHROCYTE [DISTWIDTH] IN BLOOD BY AUTOMATED COUNT: 14.1 % (ref 11.5–14.5)
GLUCOSE SERPL-MCNC: 95 MG/DL (ref 70–220)
HCT VFR BLD CALC: 36.9 % (ref 37–47)
HGB BLD-MCNC: 12.2 G/DL (ref 12–16)
LYMPHOCYTES # BLD AUTO: 2.6 10^3/UL (ref 0.8–2.9)
LYMPHOCYTES NFR BLD AUTO: 28.9 % (ref 15–51)
MCH RBC QN AUTO: 30.2 PG (ref 29–33)
MCHC RBC AUTO-ENTMCNC: 33.1 G/DL (ref 32–37)
MCV RBC AUTO: 91.3 FL (ref 82–101)
MONOCYTES # BLD: 0.5 10^3/UL (ref 0.3–0.9)
MONOCYTES NFR BLD: 5 % (ref 0–11)
NEUTROPHILS # BLD: 5.8 10^3/UL (ref 1.6–7.5)
NEUTROPHILS NFR BLD AUTO: 64.1 % (ref 39–77)
NRBC # BLD MANUAL: 0 10^3/UL (ref 0–0)
NRBC BLD AUTO-RTO: 0 /100WBC (ref 0–0)
PLATELET # BLD: 242 10^3/UL (ref 140–415)
PMV BLD AUTO: 10.3 FL (ref 7.4–10.4)
POTASSIUM SERPL-SCNC: 3.7 MMOL/L (ref 3.5–5.1)
RBC # BLD AUTO: 4.04 10^6/UL (ref 4.2–5.4)
SODIUM SERPL-SCNC: 143 MMOL/L (ref 135–144)
WBC # BLD AUTO: 9.1 10^3/UL (ref 4.8–10.8)

## 2017-07-30 RX ADMIN — ALPRAZOLAM PRN MG: 0.5 TABLET ORAL at 12:19

## 2017-07-30 RX ADMIN — ALPRAZOLAM PRN MG: 0.5 TABLET ORAL at 00:38

## 2017-07-30 RX ADMIN — SENNOSIDES SCH TAB: 8.6 TABLET, FILM COATED ORAL at 08:36

## 2017-07-30 RX ADMIN — MORPHINE SULFATE PRN MG: 2 INJECTION, SOLUTION INTRAMUSCULAR; INTRAVENOUS at 14:18

## 2017-07-30 RX ADMIN — INSULIN GLARGINE SCH UNIT: 100 INJECTION, SOLUTION SUBCUTANEOUS at 08:35

## 2017-07-30 RX ADMIN — AMLODIPINE BESYLATE SCH MG: 10 TABLET ORAL at 08:36

## 2017-07-30 RX ADMIN — DOCUSATE SODIUM SCH MG: 100 CAPSULE, LIQUID FILLED ORAL at 08:37

## 2017-07-30 RX ADMIN — METOPROLOL SUCCINATE SCH MG: 50 TABLET, EXTENDED RELEASE ORAL at 08:37

## 2017-07-30 RX ADMIN — DICYCLOMINE HYDROCHLORIDE SCH MG: 10 CAPSULE ORAL at 14:15

## 2017-07-30 RX ADMIN — DICYCLOMINE HYDROCHLORIDE SCH MG: 10 CAPSULE ORAL at 05:19

## 2017-07-30 NOTE — PN
Date/Time of Note


Date/Time of Note


DATE: 7/30/17 


TIME: 12:40





Assessment/Plan


VTE Prophylaxis


VTE Prophylaxis Intervention:  other





Lines/Catheters


IV Catheter Type (from [facility]):  Saline Lock


Urinary Cath still in place:  No





Assessment/Plan


Chief Complaint/Hosp Course


- Epigastric pain associated with nausea going on for last 6 month.  Dr. Hitchcock 

is following in gastroenterology consultation. S/p EGD, report is not 

available.  Continue to follow-up gastroenterology recommendations.


- Intractable nausea and vomiting and admission, continue Zofran as needed.


- Possible diabetic gastroparesis, continue Reglan.


- Diabetes mellitus, hemoglobin A1c 7.8, continue Lantus and NovoLog.


- Hypertension, continue metoprolol and Norvasc


- Obesity with BMI of 37.6


Problems:  





Subjective


24 Hr Interval Summary


Free Text/Dictation


Patient is asymptomatic, doing well





Exam/Review of Systems


Vital Signs


Vitals





 Vital Signs








  Date Time  Temp Pulse Resp B/P Pulse Ox O2 Delivery O2 Flow Rate FiO2


 


7/30/17 08:00 98.5 69 18 174/74 95   


 


7/30/17 02:30      Room Air  














 Intake and Output   


 


 7/29/17 7/29/17 7/30/17





 15:00 23:00 07:00


 


Intake Total  960 ml 540 ml


 


Balance  960 ml 540 ml











Exam


Constitutional:  well developed


Head:  atraumatic, normocephalic


Neck:  supple


Respiratory:  clear to auscultation


Cardiovascular:  regular rate and rhythm


Gastrointestinal:  non-tender, soft


Extremities:  normal pulses





Results


Result Diagram:  


7/30/17 0430                                                                   

             7/30/17 0430





Results 24 hrs





Laboratory Tests








Test


  7/29/17


17:05 7/29/17


20:32 7/29/17


20:36 7/29/17


20:58


 


Bedside Glucose 144    123   169  


 


Glucose Level  110  #  














Test


  7/30/17


04:30 7/30/17


08:16 7/30/17


12:09 


 


 


White Blood Count 9.1  #   


 


Red Blood Count 4.04  L   


 


Hemoglobin 12.2     


 


Hematocrit 36.9  L   


 


Mean Corpuscular Volume 91.3     


 


Mean Corpuscular Hemoglobin 30.2     


 


Mean Corpuscular Hemoglobin


Concent 33.1  


  


  


  


 


 


Red Cell Distribution Width 14.1     


 


Platelet Count 242     


 


Mean Platelet Volume 10.3     


 


Neutrophils % 64.1     


 


Lymphocytes % 28.9     


 


Monocytes % 5.0     


 


Eosinophils % 1.5     


 


Basophils % 0.2     


 


Nucleated Red Blood Cells % 0.0     


 


Neutrophils # 5.8     


 


Lymphocytes # 2.6     


 


Monocytes # 0.5     


 


Eosinophils # 0.1     


 


Basophils # 0.0     


 


Nucleated Red Blood Cells # 0.0     


 


Sodium Level 143     


 


Potassium Level 3.7     


 


Chloride Level 104     


 


Carbon Dioxide Level 27     


 


Anion Gap 16     


 


Blood Urea Nitrogen 12     


 


Creatinine 0.81     


 


Glucose Level 95     


 


Calcium Level 10.3  H   


 


Bedside Glucose  121   111   











Medications


Medications





 Current Medications


Ondansetron HCl (Zofran Inj) 4 mg Q6H  PRN IV NAUSEA AND/OR VOMITING Last 

administered on 7/29/17at 13:55; Admin Dose 4 MG;  Start 7/23/17 at 15:30


Miscellaneous Information 1 ea NOTE XX ;  Start 7/23/17 at 18:30


Glucose (Glutose) 15 gm Q15M  PRN PO DECREASED GLUCOSE;  Start 7/23/17 at 18:30


Glucose (Glutose) 22.5 gm Q15M  PRN PO DECREASED GLUCOSE Last administered on 7/ 29/17at 20:13; Admin Dose 22.5 GM;  Start 7/23/17 at 18:30


Dextrose (D50w Syringe) 25 ml Q15M  PRN IV DECREASED GLUCOSE;  Start 7/23/17 at 

18:30


Dextrose (D50w Syringe) 50 ml Q15M  PRN IV DECREASED GLUCOSE;  Start 7/23/17 at 

18:30


Glucagon (Glucagen) 1 mg Q15M  PRN IM DECREASED GLUCOSE;  Start 7/23/17 at 18:30


Glucose (Glutose) 15 gm Q15M  PRN BUCCAL DECREASED GLUCOSE;  Start 7/23/17 at 18

:30


Ondansetron HCl (Zofran Inj) 4 mg Q6H  PRN IV NAUSEA AND/OR VOMITING;  Start 7/ 23/17 at 20:00


Morphine Sulfate (morphine) 2 mg Q6  PRN IV PAIN LEVEL 7-10 Last administered 

on 7/29/17at 13:55; Admin Dose 2 MG;  Start 7/23/17 at 20:00


Docusate Sodium (Colace) 200 mg BID PO  Last administered on 7/30/17at 08:37; 

Admin Dose 200 MG;  Start 7/23/17 at 21:00


Senna (Senokot) 2 tab BID PO  Last administered on 7/30/17at 08:36; Admin Dose 

2 TAB;  Start 7/23/17 at 21:00


Acetaminophen (Tylenol Tab) 650 mg Q6H  PRN PO PAIN AND OR ELEVATED TEMP Last 

administered on 7/23/17at 21:15; Admin Dose 650 MG;  Start 7/23/17 at 20:00


Cholecalciferol (Vitamin D) 2,000 unit DAILY PO  Last administered on 7/30/17at 

08:33; Admin Dose 2,000 UNIT;  Start 7/24/17 at 09:00


Zolpidem Tartrate (Ambien) 5 mg HS  PRN PO INSOMNIA Last administered on 7/29/ 17at 21:13; Admin Dose 5 MG;  Start 7/23/17 at 20:00


Amlodipine Besylate (Norvasc) 10 mg DAILY PO  Last administered on 7/30/17at 08:

36; Admin Dose 10 MG;  Start 7/24/17 at 09:00


Insulin Glargine (Lantus) 35 unit QPM SC  Last administered on 7/29/17at 21:12; 

Admin Dose 35 UNIT;  Start 7/23/17 at 21:00


Insulin Glargine (Lantus) 40 unit QAM SC  Last administered on 7/30/17at 08:35; 

Admin Dose 40 UNIT;  Start 7/24/17 at 09:00


Miscellaneous Information (* Miscellaneous Pharmacy Order)  ONCE XX ;  Start 7/ 23/17 at 20:00


Miscellaneous Information (* Miscellaneous Pharmacy Order)  ONCE XX ;  Start 7/ 23/17 at 20:00


Hydralazine HCl (Apresoline) 10 mg Q6H  PRN IV ELEVATED BLOOD PRESSURE Last 

administered on 7/29/17at 22:33; Admin Dose 10 MG;  Start 7/23/17 at 20:30


Alprazolam (Xanax) 0.5 mg Q6H  PRN PO ANXIETY Last administered on 7/30/17at 12:

19; Admin Dose 0.5 MG;  Start 7/25/17 at 16:00


Metoprolol Succinate (Toprol Xl) 50 mg BID PO  Last administered on 7/30/17at 08

:37; Admin Dose 50 MG;  Start 7/26/17 at 21:00


Dicyclomine HCl (Bentyl) 20 mg Q8 PO  Last administered on 7/30/17at 05:19; 

Admin Dose 20 MG;  Start 7/28/17 at 14:00











ALISTAIR ADEN 30, 2017 12:40

## 2017-07-30 NOTE — CONS
Date/Time of Note


Date/Time of Note


DATE: 7/30/17 


TIME: 13:32





Assessment/Plan


Assessment/Plan


Chief Complaint/Hosp Course


Patient is 76-year-old female with history of diabetes mellitus, presented to 

the ER complaining of abdominal pain confined to the epigastric area associated 

with nausea and vomiting.  This pain is going on for last 6 months.  No 

radiation to any part of the body.  No chest pain no shortness of breath no GI 

bleeding no  or CNS problem.  No history of weight loss.  Patient had a CAT 

scan done which showed the irregular contour consistent with the diagnosis of 

possible cirrhosis.


Problems:  


Additional Assessment/Plan


Additional Assessment/Plan


1.  Epigastric pain associated with nausea going on for last 6 month, resolved


2.  Irregular surface of the liver probably nodular may have cirrhosis of 

liver.  Patient's alkaline phosphatase is elevated, hepatitis panel negative


3.  Diabetes mellitus


4.  Obesity


5.  Multiple gastric hyperplastic polyp


Plan


Start empirically on Reglan for possible diabetic gastroparesis


Hepatitis panel


Cirrhosis of liver might be due to [place].  Patient may need antispasmodic 

medication


Bentyl for the pain


Stable from GI point to be discharged





Consultation Date/Type/Reason


Admit Date/Time


Jul 25, 2017 at 16:00





24 HR Interval Summary


Constitutional:  improved, no complaints





Exam/Review of Systems


Vital Signs


Vitals





 Vital Signs








  Date Time  Temp Pulse Resp B/P Pulse Ox O2 Delivery O2 Flow Rate FiO2


 


7/30/17 08:00 98.5 69 18 174/74 95   


 


7/30/17 02:30      Room Air  














 Intake and Output   


 


 7/29/17 7/29/17 7/30/17





 15:00 23:00 07:00


 


Intake Total  960 ml 540 ml


 


Balance  960 ml 540 ml











Exam


Constitutional:  alert, oriented, well developed


Psych:  nl mood/affect, no complaints


Head:  atraumatic, normocephalic


Eyes:  EOMI, PERRL, nl conjunctiva, nl lids, nl sclera


ENMT:  nl external ears & nose, nl lips & teeth, nl nasal mucosa & septum


Neck:  non-tender, supple


Respiratory:  clear to auscultation, normal air movement


Cardiovascular:  nl pulses, regular rate and rhythm


Gastrointestinal:  nl liver, spleen, non-tender, soft


Musculoskeletal:  nl extremities to inspection, nl gait and stance


Extremities:  normal pulses


Neurological:  CNS II-XII intact, nl mental status, nl speech, nl strength


Skin:  nl turgor, 


   No rash or lesions


Lymph:  nl lymph nodes





Results


Result Diagram:  


7/30/17 0430                                                                   

             7/30/17 0430





Results 24 hrs





Laboratory Tests








Test


  7/29/17


17:05 7/29/17


20:32 7/29/17


20:36 7/29/17


20:58


 


Bedside Glucose 144    123   169  


 


Glucose Level  110  #  














Test


  7/30/17


04:30 7/30/17


08:16 7/30/17


12:09 


 


 


White Blood Count 9.1  #   


 


Red Blood Count 4.04  L   


 


Hemoglobin 12.2     


 


Hematocrit 36.9  L   


 


Mean Corpuscular Volume 91.3     


 


Mean Corpuscular Hemoglobin 30.2     


 


Mean Corpuscular Hemoglobin


Concent 33.1  


  


  


  


 


 


Red Cell Distribution Width 14.1     


 


Platelet Count 242     


 


Mean Platelet Volume 10.3     


 


Neutrophils % 64.1     


 


Lymphocytes % 28.9     


 


Monocytes % 5.0     


 


Eosinophils % 1.5     


 


Basophils % 0.2     


 


Nucleated Red Blood Cells % 0.0     


 


Neutrophils # 5.8     


 


Lymphocytes # 2.6     


 


Monocytes # 0.5     


 


Eosinophils # 0.1     


 


Basophils # 0.0     


 


Nucleated Red Blood Cells # 0.0     


 


Sodium Level 143     


 


Potassium Level 3.7     


 


Chloride Level 104     


 


Carbon Dioxide Level 27     


 


Anion Gap 16     


 


Blood Urea Nitrogen 12     


 


Creatinine 0.81     


 


Glucose Level 95     


 


Calcium Level 10.3  H   


 


Bedside Glucose  121   111   











Medications


Medications





 Current Medications


Ondansetron HCl (Zofran Inj) 4 mg Q6H  PRN IV NAUSEA AND/OR VOMITING Last 

administered on 7/29/17at 13:55; Admin Dose 4 MG;  Start 7/23/17 at 15:30


Miscellaneous Information 1 ea NOTE XX ;  Start 7/23/17 at 18:30


Glucose (Glutose) 15 gm Q15M  PRN PO DECREASED GLUCOSE;  Start 7/23/17 at 18:30


Glucose (Glutose) 22.5 gm Q15M  PRN PO DECREASED GLUCOSE Last administered on 7/ 29/17at 20:13; Admin Dose 22.5 GM;  Start 7/23/17 at 18:30


Dextrose (D50w Syringe) 25 ml Q15M  PRN IV DECREASED GLUCOSE;  Start 7/23/17 at 

18:30


Dextrose (D50w Syringe) 50 ml Q15M  PRN IV DECREASED GLUCOSE;  Start 7/23/17 at 

18:30


Glucagon (Glucagen) 1 mg Q15M  PRN IM DECREASED GLUCOSE;  Start 7/23/17 at 18:30


Glucose (Glutose) 15 gm Q15M  PRN BUCCAL DECREASED GLUCOSE;  Start 7/23/17 at 18

:30


Ondansetron HCl (Zofran Inj) 4 mg Q6H  PRN IV NAUSEA AND/OR VOMITING;  Start 7/ 23/17 at 20:00


Morphine Sulfate (morphine) 2 mg Q6  PRN IV PAIN LEVEL 7-10 Last administered 

on 7/29/17at 13:55; Admin Dose 2 MG;  Start 7/23/17 at 20:00


Docusate Sodium (Colace) 200 mg BID PO  Last administered on 7/30/17at 08:37; 

Admin Dose 200 MG;  Start 7/23/17 at 21:00


Senna (Senokot) 2 tab BID PO  Last administered on 7/30/17at 08:36; Admin Dose 

2 TAB;  Start 7/23/17 at 21:00


Acetaminophen (Tylenol Tab) 650 mg Q6H  PRN PO PAIN AND OR ELEVATED TEMP Last 

administered on 7/23/17at 21:15; Admin Dose 650 MG;  Start 7/23/17 at 20:00


Cholecalciferol (Vitamin D) 2,000 unit DAILY PO  Last administered on 7/30/17at 

08:33; Admin Dose 2,000 UNIT;  Start 7/24/17 at 09:00


Zolpidem Tartrate (Ambien) 5 mg HS  PRN PO INSOMNIA Last administered on 7/29/ 17at 21:13; Admin Dose 5 MG;  Start 7/23/17 at 20:00


Amlodipine Besylate (Norvasc) 10 mg DAILY PO  Last administered on 7/30/17at 08:

36; Admin Dose 10 MG;  Start 7/24/17 at 09:00


Insulin Glargine (Lantus) 35 unit QPM SC  Last administered on 7/29/17at 21:12; 

Admin Dose 35 UNIT;  Start 7/23/17 at 21:00


Insulin Glargine (Lantus) 40 unit QAM SC  Last administered on 7/30/17at 08:35; 

Admin Dose 40 UNIT;  Start 7/24/17 at 09:00


Miscellaneous Information (* Miscellaneous Pharmacy Order)  ONCE XX ;  Start 7/ 23/17 at 20:00


Miscellaneous Information (* Miscellaneous Pharmacy Order)  ONCE XX ;  Start 7/ 23/17 at 20:00


Hydralazine HCl (Apresoline) 10 mg Q6H  PRN IV ELEVATED BLOOD PRESSURE Last 

administered on 7/29/17at 22:33; Admin Dose 10 MG;  Start 7/23/17 at 20:30


Alprazolam (Xanax) 0.5 mg Q6H  PRN PO ANXIETY Last administered on 7/30/17at 12:

19; Admin Dose 0.5 MG;  Start 7/25/17 at 16:00


Metoprolol Succinate (Toprol Xl) 50 mg BID PO  Last administered on 7/30/17at 08

:37; Admin Dose 50 MG;  Start 7/26/17 at 21:00


Dicyclomine HCl (Bentyl) 20 mg Q8 PO  Last administered on 7/30/17at 05:19; 

Admin Dose 20 MG;  Start 7/28/17 at 14:00











ZULEYMA FERNANDO MD Jul 30, 2017 13:32

## 2017-08-03 NOTE — GILP
DATE OF PROCEDURE:  08/03/2017

 

 

 

PROCEDURE PERFORMED:  A 76-year-old female undergoing this

procedure for abdominal pain confined to the epigastric area and

the severe constipation.  The risks of the procedure, related

complications, anesthetic risk and alternatives were discussed

and informed consent was obtained.

 

 

 

DESCRIPTION OF PROCEDURE:  The patient was brought to the GI lab

and sedated by the anesthesiologist.  After optimal sedation,

scope was passed as much into the esophagus, which was grossly

within normal limits.  Stomach mucosa revealed multiple polyps.

Biopsy taken from that.  The patient also had gastritis, duodenum

1st and 2nd part was within normal limits.  Biopsy taken from the

stomach and also from the polyp.  Retroversion done.  No growth

was seen.  Scope was straightened out and removed with good

patient tolerance.

 

 

 

IMPRESSION:

 

1.   Multiple polyps in the stomach, all are benign-looking.

2.   Gastritis.

3.   Normal esophagus.

4.   Normal duodenum.

 

 

PLAN:  Plan is to review histopathology.  Continue PPI in the

interim.

 

 

 

PROCEDURE PERFORMED:  Colonoscopy.

 

 

 

DESCRIPTION OF PROCEDURE:  The patient was turned around.  Scope

was passed as much into the rectum, advanced to the sigmoid,

descending, transverse colon all the way into the cecum.

Diverticulosis identified in the left side of the colon.  The

rest of the colon was normal.  While coming out mucosa lateral

inspected.  No gross lesion was identified.  The scope was

removed with good patient tolerance.  Digital examination was

normal.

 

 

 

IMPRESSION:

 

1.   Diverticulosis, otherwise negative, all the way into the

  cecum.

2.   Normal digital examination.

3.   Clarity and cleanliness was very good to adequate.

 

 

 

 

PLAN:  Stay on high-fiber diet.  If the pain is persistent

despite this, then we will start the patient on anti-spasmodic

medications.

 

 

 

 

 

 

 

Dictated By:  Cliff Hitchcock MD

 

 

 

/miranda/

 

Job#:  [id number]/Document#:  [id number]

 

D:  08/03/2017 15:43

 

T:  08/03/2017 16:44

 

CC:  Onelia Hood MD;*End*

## 2017-11-16 ENCOUNTER — HOSPITAL ENCOUNTER (INPATIENT)
Dept: HOSPITAL 10 - E/R | Age: 77
LOS: 13 days | Discharge: HOME HEALTH | DRG: 74 | End: 2017-11-29
Attending: INTERNAL MEDICINE | Admitting: INTERNAL MEDICINE
Payer: MEDICARE

## 2017-11-16 VITALS
BODY MASS INDEX: 35.46 KG/M2 | WEIGHT: 192.68 LBS | BODY MASS INDEX: 35.46 KG/M2 | HEIGHT: 62 IN | HEIGHT: 62 IN | WEIGHT: 192.68 LBS

## 2017-11-16 VITALS — HEART RATE: 80 BPM

## 2017-11-16 VITALS — DIASTOLIC BLOOD PRESSURE: 74 MMHG | SYSTOLIC BLOOD PRESSURE: 178 MMHG

## 2017-11-16 VITALS — HEART RATE: 89 BPM

## 2017-11-16 VITALS — DIASTOLIC BLOOD PRESSURE: 77 MMHG | RESPIRATION RATE: 17 BRPM | SYSTOLIC BLOOD PRESSURE: 183 MMHG

## 2017-11-16 VITALS — SYSTOLIC BLOOD PRESSURE: 168 MMHG | DIASTOLIC BLOOD PRESSURE: 74 MMHG | RESPIRATION RATE: 16 BRPM

## 2017-11-16 VITALS — TEMPERATURE: 97.5 F

## 2017-11-16 VITALS — SYSTOLIC BLOOD PRESSURE: 168 MMHG | DIASTOLIC BLOOD PRESSURE: 74 MMHG

## 2017-11-16 DIAGNOSIS — M48.02: ICD-10-CM

## 2017-11-16 DIAGNOSIS — E03.9: ICD-10-CM

## 2017-11-16 DIAGNOSIS — K31.7: ICD-10-CM

## 2017-11-16 DIAGNOSIS — I16.1: ICD-10-CM

## 2017-11-16 DIAGNOSIS — M54.12: Primary | ICD-10-CM

## 2017-11-16 DIAGNOSIS — D72.829: ICD-10-CM

## 2017-11-16 DIAGNOSIS — E11.9: ICD-10-CM

## 2017-11-16 DIAGNOSIS — I10: ICD-10-CM

## 2017-11-16 DIAGNOSIS — Z86.73: ICD-10-CM

## 2017-11-16 DIAGNOSIS — E66.9: ICD-10-CM

## 2017-11-16 DIAGNOSIS — G47.00: ICD-10-CM

## 2017-11-16 DIAGNOSIS — K29.70: ICD-10-CM

## 2017-11-16 DIAGNOSIS — N28.0: ICD-10-CM

## 2017-11-16 DIAGNOSIS — R79.89: ICD-10-CM

## 2017-11-16 LAB
ANION GAP SERPL CALC-SCNC: 14 MMOL/L (ref 8–16)
APTT BLD: 34.4 SEC (ref 25–35)
BASOPHILS # BLD AUTO: 0 10^3/UL (ref 0–0.1)
BASOPHILS NFR BLD: 0.4 % (ref 0–2)
BUN SERPL-MCNC: 26 MG/DL (ref 7–20)
CALCIUM SERPL-MCNC: 10.4 MG/DL (ref 8.4–10.2)
CHLORIDE SERPL-SCNC: 102 MMOL/L (ref 97–110)
CO2 SERPL-SCNC: 27 MMOL/L (ref 21–31)
CREAT SERPL-MCNC: 1.04 MG/DL (ref 0.44–1)
EOSINOPHIL # BLD: 0.1 10^3/UL (ref 0–0.5)
EOSINOPHIL NFR BLD: 0.7 % (ref 0–7)
ERYTHROCYTE [DISTWIDTH] IN BLOOD BY AUTOMATED COUNT: 14 % (ref 11.5–14.5)
GLUCOSE SERPL-MCNC: 200 MG/DL (ref 70–220)
HCT VFR BLD CALC: 37.9 % (ref 37–47)
HGB BLD-MCNC: 12.6 G/DL (ref 12–16)
INR PPP: 0.96
LYMPHOCYTES # BLD AUTO: 2.6 10^3/UL (ref 0.8–2.9)
LYMPHOCYTES NFR BLD AUTO: 23.6 % (ref 15–51)
MCH RBC QN AUTO: 29.4 PG (ref 29–33)
MCHC RBC AUTO-ENTMCNC: 33.2 G/DL (ref 32–37)
MCV RBC AUTO: 88.6 FL (ref 82–101)
MONOCYTES # BLD: 0.5 10^3/UL (ref 0.3–0.9)
MONOCYTES NFR BLD: 4.6 % (ref 0–11)
NEUTROPHILS # BLD: 7.8 10^3/UL (ref 1.6–7.5)
NEUTROPHILS NFR BLD AUTO: 70.3 % (ref 39–77)
NRBC # BLD MANUAL: 0 10^3/UL (ref 0–0)
NRBC BLD AUTO-RTO: 0 /100WBC (ref 0–0)
PLATELET # BLD: 209 10^3/UL (ref 140–415)
PMV BLD AUTO: 9.9 FL (ref 7.4–10.4)
POTASSIUM SERPL-SCNC: 4.2 MMOL/L (ref 3.5–5.1)
PROTHROMBIN TIME: 12.8 SEC (ref 12.2–14.2)
PT RATIO: 1
RBC # BLD AUTO: 4.28 10^6/UL (ref 4.2–5.4)
SODIUM SERPL-SCNC: 139 MMOL/L (ref 135–144)
TROPONIN I SERPL-MCNC: 0.02 NG/ML (ref 0–0.12)
WBC # BLD AUTO: 11.1 10^3/UL (ref 4.8–10.8)

## 2017-11-16 PROCEDURE — 97116 GAIT TRAINING THERAPY: CPT

## 2017-11-16 PROCEDURE — 84439 ASSAY OF FREE THYROXINE: CPT

## 2017-11-16 PROCEDURE — 84484 ASSAY OF TROPONIN QUANT: CPT

## 2017-11-16 PROCEDURE — 80048 BASIC METABOLIC PNL TOTAL CA: CPT

## 2017-11-16 PROCEDURE — 87040 BLOOD CULTURE FOR BACTERIA: CPT

## 2017-11-16 PROCEDURE — 97110 THERAPEUTIC EXERCISES: CPT

## 2017-11-16 PROCEDURE — 82553 CREATINE MB FRACTION: CPT

## 2017-11-16 PROCEDURE — 70450 CT HEAD/BRAIN W/O DYE: CPT

## 2017-11-16 PROCEDURE — 94760 N-INVAS EAR/PLS OXIMETRY 1: CPT

## 2017-11-16 PROCEDURE — 80061 LIPID PANEL: CPT

## 2017-11-16 PROCEDURE — 82962 GLUCOSE BLOOD TEST: CPT

## 2017-11-16 PROCEDURE — 92526 ORAL FUNCTION THERAPY: CPT

## 2017-11-16 PROCEDURE — 71010: CPT

## 2017-11-16 PROCEDURE — 81001 URINALYSIS AUTO W/SCOPE: CPT

## 2017-11-16 PROCEDURE — 83036 HEMOGLOBIN GLYCOSYLATED A1C: CPT

## 2017-11-16 PROCEDURE — 97530 THERAPEUTIC ACTIVITIES: CPT

## 2017-11-16 PROCEDURE — 87086 URINE CULTURE/COLONY COUNT: CPT

## 2017-11-16 PROCEDURE — 36415 COLL VENOUS BLD VENIPUNCTURE: CPT

## 2017-11-16 PROCEDURE — 85025 COMPLETE CBC W/AUTO DIFF WBC: CPT

## 2017-11-16 PROCEDURE — 93306 TTE W/DOPPLER COMPLETE: CPT

## 2017-11-16 PROCEDURE — 72156 MRI NECK SPINE W/O & W/DYE: CPT

## 2017-11-16 PROCEDURE — 92610 EVALUATE SWALLOWING FUNCTION: CPT

## 2017-11-16 PROCEDURE — 85610 PROTHROMBIN TIME: CPT

## 2017-11-16 PROCEDURE — 97162 PT EVAL MOD COMPLEX 30 MIN: CPT

## 2017-11-16 PROCEDURE — 93005 ELECTROCARDIOGRAM TRACING: CPT

## 2017-11-16 PROCEDURE — 96375 TX/PRO/DX INJ NEW DRUG ADDON: CPT

## 2017-11-16 PROCEDURE — 96376 TX/PRO/DX INJ SAME DRUG ADON: CPT

## 2017-11-16 PROCEDURE — 80307 DRUG TEST PRSMV CHEM ANLYZR: CPT

## 2017-11-16 PROCEDURE — 96374 THER/PROPH/DIAG INJ IV PUSH: CPT

## 2017-11-16 PROCEDURE — 84443 ASSAY THYROID STIM HORMONE: CPT

## 2017-11-16 PROCEDURE — 82550 ASSAY OF CK (CPK): CPT

## 2017-11-16 PROCEDURE — 70553 MRI BRAIN STEM W/O & W/DYE: CPT

## 2017-11-16 PROCEDURE — 85730 THROMBOPLASTIN TIME PARTIAL: CPT

## 2017-11-16 RX ADMIN — HYDRALAZINE HYDROCHLORIDE PRN MG: 20 INJECTION INTRAMUSCULAR; INTRAVENOUS at 16:10

## 2017-11-16 RX ADMIN — LABETALOL HYDROCHLORIDE PRN MG: 5 INJECTION, SOLUTION INTRAVENOUS at 11:09

## 2017-11-16 RX ADMIN — LABETALOL HYDROCHLORIDE PRN MG: 5 INJECTION, SOLUTION INTRAVENOUS at 10:56

## 2017-11-16 RX ADMIN — RANITIDINE SCH MG: 150 TABLET ORAL at 20:52

## 2017-11-16 RX ADMIN — INSULIN GLARGINE SCH UNIT: 100 INJECTION, SOLUTION SUBCUTANEOUS at 20:58

## 2017-11-16 NOTE — ERD
ER Documentation


Chief Complaint


Chief Complaint


pt bib family with c/o neck and shoulder pain x 3 wks, seen PMD ,





HPI


Patient is a 77-year-old female with hypertension and diabetes who presents 

with neck pain and arm weakness.  The patient has had 3 weeks of pain in her 

neck which radiates down her left arm and she has had weakness in the left arm 

was shaking.  She is upper back pain as well and chest pain.  The symptoms of 

chest pain started today.  She saw Dr. Lomeli her primary doctor on Monday.  

Upon review of old medical records the patient has had multiple visits to the 

ER for various complaints.





ROS


All systems reviewed and are negative except as per history of present illness.





Medications


Home Meds


Reported Medications


Gabapentin* (Gabapentin*) 300 Mg Capsule, 600 MG PO QHS, #180 CAP


   11/16/17


Gabapentin* (Gabapentin*) 300 Mg Capsule, 300 MG PO QAM, #60 CAP


   11/16/17


Tramadol Hcl* (Ultram*) 50 Mg Tablet, 50 MG PO Q6H Y for PAIN, TAB


   11/16/17


Hydrocodone/Acetaminophen (Norco  Tablet) 1 Each Tablet, 1 EACH PO DAILY 

Y for SEVERE PAIN LEVEL 7-10, TAB


   11/16/17


Donepezil* (Donepezil*) 10 Mg Tablet, 10 MG PO DAILY, #30 TAB


   11/16/17


Lisinopril* (Lisinopril*) 40 Mg Tablet, 40 MG PO DAILY, #30 TAB


   11/16/17


Dexlansoprazole (Dexilant) 60 Mg Cap., 60 MG PO DAILY, #30 CAP


   11/16/17


Ranitidine Hcl* (Zantac*) 300 Mg Tablet, 300 MG PO HS, #30 TAB


   11/16/17


Alprazolam* (Alprazolam*) 0.5 Mg Tablet, 0.5 MG PO QHS Y for SLEEP, TAB


   7/23/17


Insulin Glargine* (Lantus*) 100 Unit/Ml Soln, 35 UNIT SC QPM, #1 VIAL


   7/23/17


Insulin Glargine* (Lantus*) 100 Unit/Ml Soln, 40 UNIT SC QAM, #1 VIAL


   7/23/17


Ca Carbonate/Vitamin D3/Vit K (VIACTIV SOFT CHEW TABLET) 1 Each Tab.chew, 1 

EACH PO WITH MEALS, TAB.CHEW


   9/21/14


Cholecalciferol (Vitamin D3) (VITAMIN D-3) 2,000 Unit Capsule, 2000 UNIT PO 

DAILY


   9/21/14


Discontinued Reported Medications


Omeprazole* (Omeprazole*) 40 Mg Capsule.dr, 40 MG PO DAILY, #30 CAP


   7/23/17


Metoprolol Succinate* (Toprol XL*) 100 Mg Tab.sr.24h, 100 MG PO DAILY, #30 TAB


   7/23/17


Exenatide Microspheres (Bydureon Pen) 2 Mg/0.65 Ml Pen.injctr, 2 MG SQ Q7D for 

ON Saturday., EACH


   5/21/17


Zolpidem Tartrate* (Zolpidem Tartrate*) 5 Mg Tablet, 10 MG PO HS Y, TAB


   10/16/14


Amlodipine-Valsartan-HCTZ (Exforge HCT) -12.5 Mg Tab, 1 TAB PO DAILY, TAB


   9/21/14





Allergies


Allergies:  


Coded Allergies:  


     No Known Allergies (Verified  Allergy, Mild, 7/23/17)





PMhx/Soc


Medical and Surgical Hx:  pt denies Surgical Hx


History of Surgery:  No


Anesthesia Reaction:  No


Hx Neurological Disorder:  Yes (MEMORY LOSS)


Hx Respiratory Disorders:  No


Hx Cardiac Disorders:  Yes (HTN)


Hx Psychiatric Problems:  Yes (DEPRESSION)


Hx Miscellaneous Medical Probl:  No


Hx Alcohol Use:  No


Hx Substance Use:  No


Hx Tobacco Use:  No


Smoking Status:  Never smoker





FmHx


Family History:  diabetes





Physical Exam


Vitals





Vital Signs








  Date Time  Temp Pulse Resp B/P Pulse Ox O2 Delivery O2 Flow Rate FiO2


 


11/16/17 11:48  70 20 181/71 97 Nasal Cannula 1.0 


 


11/16/17 10:57  71 20 198/63 96 Room Air  


 


11/16/17 10:35 97.5 76 20 245/93 100 Room Air  


 


11/16/17 09:38 98.3 89 16 237/102 99   








Physical Exam


Const: Moderate distress secondary to pain


Head:   Atraumatic 


Eyes:    Normal Conjunctiva


ENT:    Normal External Ears, Nose and Mouth.


Neck:               Full range of motion..~ No meningismus.


Resp:    Clear to auscultation bilaterally


Cardio:    Regular rate and rhythm, no murmurs


Abd:    Soft, non tender, non distended. Normal bowel sounds


Skin:    No petechiae or rashes


Back:    No midline or flank tenderness


Ext:    No cyanosis, or edema


Neur:    Awake and alert, left-sided arm weakness with pronator drift, 

decreased  strength on the left compared to the right, cranial nerves II 

through XII appear intact


Psych:    Normal Mood and Affect


Result Diagram:  


11/16/17 1010                                                                  

              11/16/17 1010





Results 24 hrs





 Laboratory Tests








Test


  11/16/17


10:10


 


White Blood Count 11.110^3/ul 


 


Red Blood Count 4.2810^6/ul 


 


Hemoglobin 12.6g/dl 


 


Hematocrit 37.9% 


 


Mean Corpuscular Volume 88.6fl 


 


Mean Corpuscular Hemoglobin 29.4pg 


 


Mean Corpuscular Hemoglobin


Concent 33.2g/dl 


 


 


Red Cell Distribution Width 14.0% 


 


Platelet Count 94693^3/UL 


 


Mean Platelet Volume 9.9fl 


 


Neutrophils % 70.3% 


 


Lymphocytes % 23.6% 


 


Monocytes % 4.6% 


 


Eosinophils % 0.7% 


 


Basophils % 0.4% 


 


Nucleated Red Blood Cells % 0.0/100WBC 


 


Neutrophils # 7.810^3/ul 


 


Lymphocytes # 2.610^3/ul 


 


Monocytes # 0.510^3/ul 


 


Eosinophils # 0.110^3/ul 


 


Basophils # 0.010^3/ul 


 


Nucleated Red Blood Cells # 0.010^3/ul 


 


Prothrombin Time 12.8Sec 


 


Prothrombin Time Ratio 1.0 


 


INR International Normalized


Ratio 0.96 


 


 


Activated Partial


Thromboplast Time 34.4Sec 


 


 


Sodium Level 139mmol/L 


 


Potassium Level 4.2mmol/L 


 


Chloride Level 102mmol/L 


 


Carbon Dioxide Level 27mmol/L 


 


Anion Gap 14 


 


Blood Urea Nitrogen 26mg/dl 


 


Creatinine 1.04mg/dl 


 


Glucose Level 200mg/dl 


 


Hemoglobin A1c 6.5% 


 


Calcium Level 10.4mg/dl 


 


Troponin I 0.017ng/ml 








 Current Medications








 Medications


  (Trade)  Dose


 Ordered  Sig/Ac


 Route


 PRN Reason  Start Time


 Stop Time Status Last Admin


Dose Admin


 


 Labetalol HCl


  (Labetalol)  20 mg  Q20M  PRN


 IV


 ELEVATED BLOOD PRESSURE  11/16/17 10:00


 11/16/17 11:09 DC 11/16/17 11:09


 


 


 Morphine Sulfate


  (morphine)  4 mg  ONCE  STAT


 IV


   11/16/17 09:56


 11/16/17 09:58 DC 11/16/17 11:09


 


 


 Ondansetron HCl


  (Zofran Inj)  4 mg  ONCE  STAT


 IV


   11/16/17 09:56


 11/16/17 09:58 DC 11/16/17 11:09


 


 


 Ondansetron HCl


  (Zofran Inj)  4 mg  ER BRIDGE PRN


 IV


 NAUSEA AND/OR VOMITING  11/16/17 10:30


 11/17/17 10:29   


 


 


 Acetaminophen


  (Tylenol Tab)  650 mg  ER BRIDGE PRN


 PO


 MILD PAIN/FEVER  11/16/17 10:30


 11/17/17 10:29   


 











Procedures/MDM


EKG read by me: 


Rate/Rhythm: Regular rate and rhythm at a normal rate


Intervals:    Normal


Impression:     No evidence of ischemia or arrhythmia





CT brain shows no acute abnormality per radiology.  Chest x-ray negative per 

radiology.





Patient is a 77-year-old female with hypertension diabetes who presents with 

symptoms consistent with acute stroke.  I believe the patient will require 

admission for further workup for possible stroke.  It is possible the patient 

has some sort of cervical radiculopathy but my concern would be stroke given 

her age and comorbidities.  She is outside the window for TPA and is not a TPA 

candidate.  The patient was given aspirin and will be admitted to the care of 

Dr. Maza.  I spoke with Dr. Prasad Lomeli who asked me to admit to Dr. Maza.





Departure


Diagnosis:  


 Primary Impression:  


 Stroke


 CVA mechanism:  unspecified  Qualified Code:  I63.9 - Cerebrovascular accident 

(CVA), unspecified mechanism


Condition:  DONALD Johnson MD Nov 16, 2017 13:10

## 2017-11-16 NOTE — CONS
DATE OF ADMISSION: 11/16/2017

DATE OF CONSULTATION:  11/16/2017

 

 

 

REASON FOR CONSULTATION:  Hypertension, assess for rule out acute coronary syndrome.

 

REQUESTING PHYSICIAN:  Charanjit Alaniz MD

 

HISTORY OF PRESENT ILLNESS:  The patient is a 77-year-old female with history of hypertension, diabe
frank, and gastritis who initially presented with complaints of neck pain, arm weakness.  Initially up
on arrival, temperature 98.3, blood pressure 237/102, pulse 89, respirations 16, saturating 99%.  Song case's labs revealed white count 11.1, hemoglobin 12.6, platelet count 209.  Sodium 139, potassium 
4.2, creatinine 1.0, BUN 26.  Troponin negative.  Calcium 10.4, INR 0.9, hemoglobin A1c of 6.5.  The
 patient underwent a chest x-ray revealing no acute cardiopulmonary abnormalities and a head CT that
 revealed no acute intracranial hemorrhage, mild intracranial atherosclerosis, old infarct in the me
dial and right occipital lobes, small old infarct, inferior right cerebellum, and mild _____ general
ized cerebral volume loss.  The patient does not have an electrocardiogram in chart for my review at
 this time.  The patient with a diagnosis of possible acute stroke was not given TPA.  Patient has n
ow been admitted to the floor.

 

PAST MEDICAL HISTORY:  As above in HPI.

 

MEDICATIONS CURRENTLY IN HOSPITAL:  

1. Aricept 10 mg daily.

2. Zestril 40 mg daily.

3. Aspirin 81 mg daily.

4. Lovenox _____ daily.

5. Zantac 300 mg at bedtime.

6. Lantus insulin.

7. Tramadol.

8. Zofran.

9. Tylenol.

10. Morphine.

11. Hydralazine p.r.n.

 

ALLERGIES:  NO KNOWN DRUG ALLERGIES.

 

SOCIAL HISTORY:  No tobacco, ETOH or illicit drug use.

 

FAMILY HISTORY:  Denies sudden cardiac death or early CAD.

 

REVIEW OF SYSTEMS:  As above in HPI.

CONSTITUTIONAL:  No fevers, chills.

PULMONARY:  No current shortness of breath.

CARDIOVASCULAR:  No current chest pain or hypertension.

GASTROINTESTINAL:  No vomiting.  Mild abdominal pain.

GENITOURINARY:  No hematuria.

MUSCULOSKELETAL:  Neck pain, arm weakness.

PSYCHIATRIC:  No documented psych history.

NEUROLOGIC:  Possible CVA.

 

PHYSICAL EXAMINATION

VITAL SIGNS:  Temperature of 97.5, blood pressure 160/74, pulse 80, respiratory rate 17, saturation 
100% on 2 liters.

GENERAL:  The patient is alert, awake, complaining of abdominal pain and arm weakness, neck  pain.

NECK:  JVP approximately 8 cm water.

CHEST:  Fair movement throughout with mildly decreased breath sounds at bases bilaterally.

HEART:  Regular rate and rhythm, normal S1, S2, I/VI systolic murmur, nondisplaced PMI.

ABDOMEN:  Positive bowel sounds, soft.

EXTREMITIES:  No edema, 1+ pulses bilaterally posterior tibial.

 

LABORATORY DATA:  As above in HPI.  No further labs for my review at this time.

 

IMAGING STUDIES:  As above in HPI.  No further imaging studies for my review at this time.

 

ECG:  No electrocardiogram to review at this time.

 

IMPRESSION:

1. Hypertensive urgency/emergency, slowly improving on oral antihypertensives.

2. Possible acute cerebrovascular accident.

3. Diabetes mellitus.

4. Abdominal pain.

5. Arm weakness, left upper extremity.

6. Prerenal ischemia.

7. Mild leukocytosis.

 

RECOMMENDATIONS:

1. At this time, would maintain patient on telemetry monitoring to follow rhythm and rate control cl
osely.

2. Would continue the patient's current Zestril for control of blood pressure and will likely need t
o add additional antihypertensive.  If patient is passing the window for acute stroke, may want to p
ermit hypertension and therefore will slowly lower the blood pressure and will continue to use IV pu
sh p.r.n. hydralazine as necessary.

3. Check a 2D echocardiogram to further assess patient's ejection fraction, wall motion and any dolores
r valve abnormalities.

4. Continue the patient's aspirin for prophylaxis against cardiovascular events.

5. Continue the patient's insulin therapy and follow blood sugars closely with ongoing neurologic ev
aluation for possible CVA with possible repeat head CT and MRI pending.

6. I am going to check a fasting lipid panel for general risk stratification and initiate lipid-lowe
ring medication as necessary.  

7. Complete a rule out for myocardial infarction to ensure that the patient's symptoms are not the r
esult of or have not resulted in acute coronary syndrome/acute myocardial infarction.

 

Thank you for allowing me to take part in the care of this patient.  I will continue to follow along
 very closely with you.  Further recommendations will be made as the patient progresses through her 
inpatient hospital clinical course.

 

 

Dictated By: ZULEIKA MCGHEE/BRIAN

DD:    11/16/2017 18:49:30

DT:    11/16/2017 19:41:28

Conf#: [id number]

DID#:  [id number]

CC: CHARANJIT ALANIZ MD;*End*

## 2017-11-16 NOTE — RADRPT
PROCEDURE:   XR Chest. 

 

CLINICAL INDICATION:    chest pain, CVA

 

TECHNIQUE:   Single frontal view of the chest was obtained 

 

COMPARISON:   9/11/16 

 

FINDINGS:

The heart and mediastinum are within normal limits.  

The lungs are clear.

There is no pleural effusion or pneumothorax.   

 

RPTAT: AA

 

IMPRESSION:

No acute disease.

_____________________________________________ 

.Tayo Parker MD, MD           Date    Time 

Electronically viewed and signed by .Tayo Parker MD, MD on 11/16/2017 10:27 

 

D:  11/16/2017 10:27  T:  11/16/2017 10:27

.S/

## 2017-11-16 NOTE — HP
Date/Time of Note


Date/Time of Note


DATE: 11/16/17 


TIME: 15:04





Assessment/Plan


VTE Prophylaxis


VTE Prophylaxis Intervention:  SCD's





Lines/Catheters


IV Catheter Type (from [facility]):  Saline Lock





Assessment/Plan


Assessment/Plan


-Possible acute stroke in patient with history of stroke, continue aspirin, Dr Sarah is asked to see patient in neurology consultation.  MRI of the 

brain and MRI of cervical spine.


-Hypertensive urgency, continue hydralazine


-Rule out acute coronary syndrome, obtain cardiac enzymes 3 and 2D echo, Dr. Eldridge is asked to see patient in cardiology consultation.


-Diabetes mellitus, continue Lantus, pre-meal NovoLog and NovoLog per sliding 

scale.


-Obesity with BMI of 36.2


-History of gastritis, continue PPI





Will obtain TSH is and T4.





Further recommendations based on clinical course.  Plan of care discussed with 

Dr. Maza





HPI/ROS


Admit Date/Time


Admit Date/Time








Hx of Present Illness


The patient is 77-year-old  female with history of hypertension 

diabetes obesity, gastritis.  The patient is status post EGD by Dr. Hitchcock in 

August 2017 was notion of gastritis and stomach polyps.  Patient is presented 

with complaints of severe neck pain and left arm weakness.  Most of the history 

was obtained from patient's daughter at the bedside who lives with patient.  

Patient's follow with Dr. Lopez primary doctor and was diagnosed with 

hypothyroidism.  Patient also underwent a steroid injection in the left 

shoulder and left knee by Dr. duke ruiz.  Patient also complains of insomnia 

for which she takes Xanax and Ambien as needed.  Patient denies any fever 

chills denies nausea vomiting, denies diarrhea. Patient underwent CT of the 

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 

frontal periventricular white matter/caudate head, and small old infarct in the 

inferior right cerebellum, no acute infarct.  Patient was diagnosed with 

possible acute stroke however patient is  outside the window for TPA.   

Troponin was negative on admission.  Patient also noted to have blood pressure 

elevated to 237 over 102 on admission.  Patient will be admitted for further 

evaluation and management.





ROS


Per HPI





PMH/Family/Social


Past Medical History


Medical History:  diabetes, hypertension





Past Surgical History


Status post cataract surgery





Family History


Significant Family History:  no pertinent family hx





Social History


Alcohol Use:  none


Smoking Status:  Never smoker


Drug Use:  none





Exam/Review of Systems


Vital Signs


Vitals





 Vital Signs








  Date Time  Temp Pulse Resp B/P Pulse Ox O2 Delivery O2 Flow Rate FiO2


 


11/16/17 14:45  73 15 173/69 99 Room Air  


 


11/16/17 11:48       1.0 


 


11/16/17 10:35 97.5       











Exam


Constitutional:  alert, oriented


Head:  normocephalic


Eyes:  nl conjunctiva


Neck:  supple


Respiratory:  normal air movement


Cardiovascular:  nl pulses


Gastrointestinal:  non-tender, soft


Musculoskeletal:  nl extremities to inspection


Neurological:  other (Left upper extremity weakness)


Skin:  nl turgor





Labs


Result Diagram:  


11/16/17 1010                                                                  

              11/16/17 1010














ALIRIO MC Nov 16, 2017 15:14

## 2017-11-16 NOTE — RADRPT
PROCEDURE:   CT Brain without contrast. 

 

CLINICAL INDICATION:  Neurological deficit.

 

TECHNIQUE:   A CT of the brain was performed on multidetector high-resolution CT scanner utilizing a
xial sections from the skull base through the vertex without contrast.  The scan was reviewed in sof
t tissue brain and high frequency resolution bone algorithm windows.  Images were reviewed on a high
-resolution PACS workstation. One or more the following does reduction techniques were utilized: Aut
omated exposure control, adjustment of the mA/ or kV according to patient's size, or use of iterativ
e reconstruction technique. The exam CTDI = 42.81 mGy and the DLP = 720.23 mGy-cm. 

DICOM images are available.

 

COMPARISON:   Brain CT 05/21/2017.

 

FINDINGS:

 

The ventricles and sulci are mildly to moderately prominent indicative of volume loss. There is mild
 cerebellar volume loss.  There is no intracranial hemorrhage, mass effect or midline shift.  No abn
ormal intra-axial or extra-axial fluid collections are seen. The gray/white matter differentiation i
s preserved. 

Old infarct are again noted in the medial right occipital lobe and left frontal periventricular whit
e matter/caudate head. There is ex vacuo dilatation of the frontal horn of the left lateral ventricl
e. Small old infarct in the inferior right cerebellum is noted.

 

There are mild to moderate scattered foci of hypoattenuation in the white matter, which are nonspeci
fic in etiology but likely reflect chronic small vessel ischemic changes.  There are mild intracrani
al vascular calcifications consistent with atherosclerosis. The visualized paranasal sinuses are ess
entially clear. There is thinning of bilateral lens indicative of prior lens replacement.

 

 

IMPRESSION:

 

1.  No acute intracranial hemorrhage, transcortical infarction or mass effect.

 

2.  Mild intracranial atherosclerosis and mild to moderate chronic small vessel ischemic changes. 

 

3.  Old infarcts in the medial right occipital lobe and left frontal periventricular white matter/ca
udate head. 

 

4.  Small old infarct in the inferior right cerebellum.

 

5.  Mild to moderate generalized cerebral volume loss.

 

 

RPTAT: HH

_____________________________________________ 

.Ferdinand Cantu MD, MD           Date    Time 

Electronically viewed and signed by .Ferdinand Cantu MD, MD on 11/16/2017 10:52 

 

D:  11/16/2017 10:52  T:  11/16/2017 10:52

.N/

## 2017-11-17 VITALS — SYSTOLIC BLOOD PRESSURE: 197 MMHG | HEART RATE: 108 BPM | DIASTOLIC BLOOD PRESSURE: 80 MMHG

## 2017-11-17 VITALS — HEART RATE: 117 BPM

## 2017-11-17 VITALS — SYSTOLIC BLOOD PRESSURE: 186 MMHG | HEART RATE: 108 BPM | DIASTOLIC BLOOD PRESSURE: 57 MMHG

## 2017-11-17 VITALS — RESPIRATION RATE: 19 BRPM | SYSTOLIC BLOOD PRESSURE: 147 MMHG | DIASTOLIC BLOOD PRESSURE: 72 MMHG

## 2017-11-17 VITALS — DIASTOLIC BLOOD PRESSURE: 75 MMHG | SYSTOLIC BLOOD PRESSURE: 140 MMHG | RESPIRATION RATE: 19 BRPM

## 2017-11-17 VITALS — HEART RATE: 114 BPM

## 2017-11-17 VITALS — DIASTOLIC BLOOD PRESSURE: 80 MMHG | SYSTOLIC BLOOD PRESSURE: 188 MMHG | RESPIRATION RATE: 18 BRPM

## 2017-11-17 VITALS — HEART RATE: 100 BPM

## 2017-11-17 VITALS — RESPIRATION RATE: 18 BRPM | DIASTOLIC BLOOD PRESSURE: 74 MMHG | SYSTOLIC BLOOD PRESSURE: 157 MMHG

## 2017-11-17 VITALS — HEART RATE: 79 BPM

## 2017-11-17 VITALS — DIASTOLIC BLOOD PRESSURE: 81 MMHG | SYSTOLIC BLOOD PRESSURE: 125 MMHG | RESPIRATION RATE: 18 BRPM

## 2017-11-17 VITALS — HEART RATE: 92 BPM | SYSTOLIC BLOOD PRESSURE: 154 MMHG | DIASTOLIC BLOOD PRESSURE: 67 MMHG

## 2017-11-17 VITALS — HEART RATE: 84 BPM

## 2017-11-17 VITALS — HEART RATE: 96 BPM

## 2017-11-17 VITALS — HEART RATE: 102 BPM

## 2017-11-17 LAB
ADD UMIC: YES
ANION GAP SERPL CALC-SCNC: 13 MMOL/L (ref 8–16)
BACTERIA #/AREA URNS HPF: (no result) /HPF
BASOPHILS # BLD AUTO: 0 10^3/UL (ref 0–0.1)
BASOPHILS NFR BLD: 0.2 % (ref 0–2)
BUN SERPL-MCNC: 27 MG/DL (ref 7–20)
CALCIUM SERPL-MCNC: 10.4 MG/DL (ref 8.4–10.2)
CHLORIDE SERPL-SCNC: 103 MMOL/L (ref 97–110)
CHOLEST SERPL-MCNC: 163 MG/DL (ref 100–200)
CHOLEST/HDLC SERPL: 3.8 RATIO
CK MB SERPL-MCNC: 2.13 NG/ML (ref 0–2.4)
CK MB SERPL-MCNC: 2.63 NG/ML (ref 0–2.4)
CK SERPL-CCNC: 37 IU/L (ref 23–200)
CK SERPL-CCNC: 40 IU/L (ref 23–200)
CO2 SERPL-SCNC: 27 MMOL/L (ref 21–31)
COLOR UR: YELLOW
CREAT SERPL-MCNC: 0.95 MG/DL (ref 0.44–1)
EOSINOPHIL # BLD: 0 10^3/UL (ref 0–0.5)
EOSINOPHIL NFR BLD: 0.2 % (ref 0–7)
ERYTHROCYTE [DISTWIDTH] IN BLOOD BY AUTOMATED COUNT: 14.5 % (ref 11.5–14.5)
GLUCOSE SERPL-MCNC: 179 MG/DL (ref 70–220)
GLUCOSE UR STRIP-MCNC: NEGATIVE MG/DL
HCT VFR BLD CALC: 41.1 % (ref 37–47)
HDLC SERPL-MCNC: 42 MG/DL (ref 33–92)
HGB BLD-MCNC: 13.5 G/DL (ref 12–16)
KETONES UR STRIP.AUTO-MCNC: NEGATIVE MG/DL
LYMPHOCYTES # BLD AUTO: 1.9 10^3/UL (ref 0.8–2.9)
LYMPHOCYTES NFR BLD AUTO: 15.4 % (ref 15–51)
MCH RBC QN AUTO: 29.8 PG (ref 29–33)
MCHC RBC AUTO-ENTMCNC: 32.8 G/DL (ref 32–37)
MCV RBC AUTO: 90.7 FL (ref 82–101)
MONOCYTES # BLD: 0.6 10^3/UL (ref 0.3–0.9)
MONOCYTES NFR BLD: 4.4 % (ref 0–11)
MUCOUS THREADS #/AREA URNS HPF: (no result) /HPF
NEUTROPHILS # BLD: 10 10^3/UL (ref 1.6–7.5)
NEUTROPHILS NFR BLD AUTO: 79.3 % (ref 39–77)
NITRITE UR QL STRIP.AUTO: NEGATIVE MG/DL
NRBC # BLD MANUAL: 0 10^3/UL (ref 0–0)
NRBC BLD AUTO-RTO: 0 /100WBC (ref 0–0)
PLATELET # BLD: 242 10^3/UL (ref 140–415)
PMV BLD AUTO: 10.4 FL (ref 7.4–10.4)
POTASSIUM SERPL-SCNC: 4.6 MMOL/L (ref 3.5–5.1)
RBC # BLD AUTO: 4.53 10^6/UL (ref 4.2–5.4)
RBC # UR AUTO: NEGATIVE MG/DL
SODIUM SERPL-SCNC: 138 MMOL/L (ref 135–144)
SQUAMOUS #/AREA URNS HPF: (no result) /HPF
TRIGL SERPL-MCNC: 76 MG/DL (ref 0–149)
TROPONIN I SERPL-MCNC: 0.18 NG/ML (ref 0–0.12)
TROPONIN I SERPL-MCNC: 0.28 NG/ML (ref 0–0.12)
TSH SERPL-ACNC: 2.02 MIU/L (ref 0.47–4.68)
UR ASCORBIC ACID: NEGATIVE MG/DL
UR BILIRUBIN (DIP): NEGATIVE MG/DL
UR CLARITY: (no result)
UR PH (DIP): 5 (ref 5–9)
UR RBC: 5 /HPF (ref 0–5)
UR SPECIFIC GRAVITY (DIP): 1.02 (ref 1–1.03)
UR TOTAL PROTEIN (DIP): (no result) MG/DL
UROBILINOGEN UR STRIP-ACNC: (no result) MG/DL
WBC # BLD AUTO: 12.6 10^3/UL (ref 4.8–10.8)
WBC # UR STRIP: (no result) LEU/UL

## 2017-11-17 RX ADMIN — DONEPEZIL HYDROCHLORIDE SCH MG: 10 TABLET ORAL at 09:36

## 2017-11-17 RX ADMIN — METOPROLOL TARTRATE SCH MG: 25 TABLET, FILM COATED ORAL at 20:48

## 2017-11-17 RX ADMIN — LISINOPRIL SCH MG: 20 TABLET ORAL at 09:36

## 2017-11-17 RX ADMIN — CEFTRIAXONE SCH MLS/HR: 1 INJECTION, SOLUTION INTRAVENOUS at 21:17

## 2017-11-17 RX ADMIN — ASPIRIN 81 MG SCH MG: 81 TABLET ORAL at 09:36

## 2017-11-17 RX ADMIN — RANITIDINE SCH MG: 150 TABLET ORAL at 20:48

## 2017-11-17 RX ADMIN — HYDRALAZINE HYDROCHLORIDE PRN MG: 20 INJECTION INTRAMUSCULAR; INTRAVENOUS at 17:21

## 2017-11-17 RX ADMIN — ENOXAPARIN SODIUM SCH MG: 100 INJECTION SUBCUTANEOUS at 09:43

## 2017-11-17 RX ADMIN — CEFTRIAXONE SCH MLS/HR: 1 INJECTION, SOLUTION INTRAVENOUS at 18:00

## 2017-11-17 RX ADMIN — INSULIN GLARGINE SCH UNIT: 100 INJECTION, SOLUTION SUBCUTANEOUS at 21:02

## 2017-11-17 RX ADMIN — HYDRALAZINE HYDROCHLORIDE PRN MG: 20 INJECTION INTRAMUSCULAR; INTRAVENOUS at 03:40

## 2017-11-17 NOTE — RADRPT
Vent Rate: 112 bpm

RR Interval: 0 msec

NE Interval: 154 msec

QRS Duration: 80 msec

QT Interval: 338 msec

QTC Interval: 461 msec

P-R-T Axis: 67 - 11 - 57 degrees

 

 Sinus tachycardia

 Nonspecific ST abnormality

 Abnormal ECG

 

Electronically Signed By: Candelario Waldrop

[id number]

## 2017-11-17 NOTE — PN
Date/Time of Note


Date/Time of Note


DATE: 11/17/17 


TIME: 17:49





Assessment/Plan


VTE Prophylaxis


VTE Prophylaxis Intervention:  SCD's





Lines/Catheters


IV Catheter Type (from [facility]rs):  Saline Lock





Assessment/Plan


Chief Complaint/Hosp Course


Patient is awake alert, tachycardic at times blood sugar is better controlled.  

Urinalysis is positive for infection will obtain urine and blood cultures start 

Rocephin


Assessment/Plan


-Cervical radiculopathy with central canal stenosis, Dr. Escoto neurology 

consult is appreciated.  Dr. Pennington is asked to see patient for pain 

management.


-Hypertensive urgency, continue hydralazine


-Elevated troponin. Dr. Eldridge is following in cardiology consultation.


-Diabetes mellitus, continue Lantus, pre-meal NovoLog and NovoLog per sliding 

scale.


-Obesity with BMI of 36.2


-History of gastritis, continue PPI





Further recommendations based on clinical course.  Plan of care discussed with 

Dr. Maza


Problems:  





Exam/Review of Systems


Vital Signs


Vitals





 Vital Signs








  Date Time  Temp Pulse Resp B/P Pulse Ox O2 Delivery O2 Flow Rate FiO2


 


11/17/17 16:00  102      


 


11/17/17 11:44 97.4  19 147/72 95   


 


11/17/17 00:00      Nasal Cannula 2.0 














 Intake and Output   


 


 11/16/17 11/16/17 11/17/17





 15:00 23:00 07:00


 


Intake Total  150 ml 


 


Balance  150 ml 











Exam


Constitutional:  alert, oriented


Neck:  supple


Respiratory:  normal air movement


Cardiovascular:  nl pulses


Gastrointestinal:  non-tender, soft


Musculoskeletal:  nl extremities to inspection


Neurological:  other (Left upper extremity weakness)


Skin:  nl turgor





Results


Result Diagram:  


11/17/17 0616                                                                  

              11/17/17 0616





Results 24 hrs





Laboratory Tests








Test


  11/16/17


20:51 11/17/17


00:43 11/17/17


01:00 11/17/17


06:16


 


Bedside Glucose 122     


 


Troponin I  0.117    0.139  *H


 


Urine Color   YELLOW   


 


Urine Clarity   CLOUDY  A 


 


Urine pH   5.0   


 


Urine Specific Gravity   1.024   


 


Urine Ketones   NEGATIVE   


 


Urine Nitrite   NEGATIVE   


 


Urine Bilirubin   NEGATIVE   


 


Urine Urobilinogen   1+  H 


 


Urine Leukocyte Esterase   2+  H 


 


Urine Microscopic RBC   5   


 


Urine Microscopic WBC   108  H 


 


Urine Squamous Epithelial


Cells 


  


  FEW  


  


 


 


Urine Bacteria   FEW  A 


 


Urine Mucus   FEW  A 


 


Urine Hemoglobin   NEGATIVE   


 


Urine Glucose   NEGATIVE   


 


Urine Total Protein   1+  H 


 


Urine Opiates Screen   Positive   


 


Urine Barbiturates   Negative   


 


Urine Amphetamines Screen   Negative   


 


Urine Benzodiazepines Screen   Positive   


 


Urine Cocaine Screen   Negative   


 


Urine Cannabinoids   Negative   


 


White Blood Count    12.6  H


 


Red Blood Count    4.53  


 


Hemoglobin    13.5  


 


Hematocrit    41.1  


 


Mean Corpuscular Volume    90.7  


 


Mean Corpuscular Hemoglobin    29.8  


 


Mean Corpuscular Hemoglobin


Concent 


  


  


  32.8  


 


 


Red Cell Distribution Width    14.5  


 


Platelet Count    242  


 


Mean Platelet Volume    10.4  


 


Neutrophils %    79.3  H


 


Lymphocytes %    15.4  


 


Monocytes %    4.4  


 


Eosinophils %    0.2  


 


Basophils %    0.2  


 


Nucleated Red Blood Cells %    0.0  


 


Neutrophils #    10.0  H


 


Lymphocytes #    1.9  


 


Monocytes #    0.6  


 


Eosinophils #    0.0  


 


Basophils #    0.0  


 


Nucleated Red Blood Cells #    0.0  


 


Sodium Level    138  


 


Potassium Level    4.6  


 


Chloride Level    103  


 


Carbon Dioxide Level    27  


 


Anion Gap    13  


 


Blood Urea Nitrogen    27  H


 


Creatinine    0.95  


 


Glucose Level    179  


 


Calcium Level    10.4  H


 


Triglycerides Level    76  


 


Cholesterol Level    163  


 


LDL Cholesterol, Calculated    106  


 


HDL Cholesterol    42  


 


Cholesterol/HDL Ratio    3.8  


 


Thyroid Stimulating Hormone


(TSH) 


  


  


  2.020  


 


 


Free Thyroxine    1.24  














Test


  11/17/17


08:36 11/17/17


11:35 11/17/17


12:08 11/17/17


16:13


 


Bedside Glucose 166   183    163  


 


Creatine Kinase   37   


 


Creatine Kinase Index   5.8   


 


Creatinine Kinase MB (Mass)   2.13   


 


Troponin I   0.180  *H 











Medications


Medications





 Current Medications


Hydralazine HCl (Apresoline) 10 mg Q6H  PRN IV SBP>170 Last administered on 11/ 17/17at 17:21; Admin Dose 10 MG;  Start 11/16/17 at 15:30


Insulin Glargine (Lantus) 21 unit DAILY@20 SC  Last administered on 11/16/17at 

20:58; Admin Dose 21 UNIT;  Start 11/16/17 at 20:00


Diagnostic Test (Pha) (Accu-Chek) 1 ea 02 XX ;  Start 11/17/17 at 02:00


Donepezil HCl (Aricept) 10 mg DAILY PO  Last administered on 11/17/17at 09:36; 

Admin Dose 10 MG;  Start 11/17/17 at 09:00


Lisinopril (Zestril) 40 mg DAILY PO  Last administered on 11/17/17at 09:36; 

Admin Dose 40 MG;  Start 11/17/17 at 09:00


Ranitidine HCl (Zantac) 300 mg HS PO  Last administered on 11/16/17at 20:52; 

Admin Dose 300 MG;  Start 11/16/17 at 21:00


Tramadol HCl (Ultram) 50 mg Q6H  PRN PO PAIN;  Start 11/16/17 at 16:00


Ondansetron HCl (Zofran Inj) 4 mg Q6H  PRN IV NAUSEA AND/OR VOMITING;  Start 11/ 16/17 at 16:00


Aspirin (Aspirin) 81 mg DAILY PO  Last administered on 11/17/17at 09:36; Admin 

Dose 81 MG;  Start 11/17/17 at 09:00


Acetaminophen (Tylenol Tab) 650 mg Q6H  PRN PO PAIN LEVEL 1-3 OR FEVER;  Start 

11/16/17 at 16:00


Morphine Sulfate (morphine) 2 mg Q4H  PRN IV PAIN LEVEL 7-10;  Start 11/16/17 

at 16:00


Enoxaparin Sodium (Lovenox) 30 mg DAILY SC  Last administered on 11/17/17at 09:

43; Admin Dose 30 MG;  Start 11/17/17 at 09:00


Miscellaneous Information 1 ea NOTE XX ;  Start 11/16/17 at 16:00


Glucose (Glutose) 15 gm Q15M  PRN PO DECREASED GLUCOSE;  Start 11/16/17 at 16:00


Glucose (Glutose) 22.5 gm Q15M  PRN PO DECREASED GLUCOSE;  Start 11/16/17 at 16:

00


Dextrose (D50w Syringe) 25 ml Q15M  PRN IV DECREASED GLUCOSE;  Start 11/16/17 

at 16:00


Dextrose (D50w Syringe) 50 ml Q15M  PRN IV DECREASED GLUCOSE;  Start 11/16/17 

at 16:00


Glucagon (Glucagen) 1 mg Q15M  PRN IM DECREASED GLUCOSE;  Start 11/16/17 at 16:

00


Glucose (Glutose) 15 gm Q15M  PRN BUCCAL DECREASED GLUCOSE;  Start 11/16/17 at 

16:00


Metoprolol Tartrate (Lopressor) 25 mg BID PO ;  Start 11/17/17 at 21:00











ALIRIO MC Nov 17, 2017 18:00

## 2017-11-17 NOTE — CONS
Date/Time of Note


Date/Time of Note


DATE: 11/17/17 


TIME: 13:07





Assessment/Plan


Assessment/Plan


Chief Complaint/Hosp Course


76 yo female with chronic neck pain p/w left arm pain.





MRI shows no acute CVA remote infarcts medial right occipital PICA and left 

caudate.


MRI C Spine: C5-6 several canal stenosis compresses the cervical cord, C4-5 

severe narrowing, deforming ventral cord


 C3-4 mod. canal stenosis 





Likely cervical radiculopathy without any myelopathic signs on exam


may use antiplatelet for secondary stroke prevention


optimization of blood pressure less than 140/90


moderate intensity statin


would recommend evaluation with pain management , per daughter they have been 

evaluated by an Orthopedic surgeon and due to her advanced medical issues and 

high surgical risk she is not a surgical candidate


Problems:  





Consultation Date/Type/Reason


Admit Date/Time


Nov 16, 2017 at 10:20


Date of Consultation:  Nov 17, 2017


Type of Consultation:  Neurology


Reason for Consultation


neck pain L arm pain


Referring Provider:  ALIRIO MC





Hx of Present Illness


77 year old  female with hx of HTN, DM, chronic neck pain and OA. She 

is admitted with c/o neck pain and arm pain, recently she underwent a 

corticosteroid injection of her left shoulder and knee with no improvement. Per 

daughter pain has now been worsening, she denies any falls no urinary/bowel 

issues. She c/o mostly of radiating pain in UE. 











Psychological:  no complaints





Past Medical History


Medical History:  diabetes, hypertension





Social History


Alcohol Use:  none


Smoking Status:  Never smoker


Drug Use:  none





Exam/Review of Systems


Vital Signs


Vitals





 Vital Signs








  Date Time  Temp Pulse Resp B/P Pulse Ox O2 Delivery O2 Flow Rate FiO2


 


11/17/17 12:00  93      


 


11/17/17 11:44 97.4  19 147/72 95   


 


11/17/17 00:00      Nasal Cannula 2.0 














 Intake and Output   


 


 11/16/17 11/16/17 11/17/17





 15:00 23:00 07:00


 


Intake Total  150 ml 


 


Balance  150 ml 











Exam


Constitutional:  alert, obese, oriented


Neurological:  CNS II-XII intact, DTR's symmetric (1+ UE and absent LE reflexes)

, nl mental status, nl speech, nl strength





Results


Result Diagram:  


11/17/17 0616                                                                  

              11/17/17 0616





Results 24 hrs





Laboratory Tests








Test


  11/16/17


17:01 11/16/17


20:51 11/17/17


00:43 11/17/17


01:00


 


Bedside Glucose 141   122    


 


Troponin I   0.117   


 


Urine Color    YELLOW  


 


Urine Clarity    CLOUDY  A


 


Urine pH    5.0  


 


Urine Specific Gravity    1.024  


 


Urine Ketones    NEGATIVE  


 


Urine Nitrite    NEGATIVE  


 


Urine Bilirubin    NEGATIVE  


 


Urine Urobilinogen    1+  H


 


Urine Leukocyte Esterase    2+  H


 


Urine Microscopic RBC    5  


 


Urine Microscopic WBC    108  H


 


Urine Squamous Epithelial


Cells 


  


  


  FEW  


 


 


Urine Bacteria    FEW  A


 


Urine Mucus    FEW  A


 


Urine Hemoglobin    NEGATIVE  


 


Urine Glucose    NEGATIVE  


 


Urine Total Protein    1+  H


 


Urine Opiates Screen    Positive  


 


Urine Barbiturates    Negative  


 


Urine Amphetamines Screen    Negative  


 


Urine Benzodiazepines Screen    Positive  


 


Urine Cocaine Screen    Negative  


 


Urine Cannabinoids    Negative  














Test


  11/17/17


06:16 11/17/17


08:36 11/17/17


11:35 11/17/17


12:08


 


White Blood Count 12.6  H   


 


Red Blood Count 4.53     


 


Hemoglobin 13.5     


 


Hematocrit 41.1     


 


Mean Corpuscular Volume 90.7     


 


Mean Corpuscular Hemoglobin 29.8     


 


Mean Corpuscular Hemoglobin


Concent 32.8  


  


  


  


 


 


Red Cell Distribution Width 14.5     


 


Platelet Count 242     


 


Mean Platelet Volume 10.4     


 


Neutrophils % 79.3  H   


 


Lymphocytes % 15.4     


 


Monocytes % 4.4     


 


Eosinophils % 0.2     


 


Basophils % 0.2     


 


Nucleated Red Blood Cells % 0.0     


 


Neutrophils # 10.0  H   


 


Lymphocytes # 1.9     


 


Monocytes # 0.6     


 


Eosinophils # 0.0     


 


Basophils # 0.0     


 


Nucleated Red Blood Cells # 0.0     


 


Sodium Level 138     


 


Potassium Level 4.6     


 


Chloride Level 103     


 


Carbon Dioxide Level 27     


 


Anion Gap 13     


 


Blood Urea Nitrogen 27  H   


 


Creatinine 0.95     


 


Glucose Level 179     


 


Calcium Level 10.4  H   


 


Troponin I 0.139  *H   0.180  *H


 


Triglycerides Level 76     


 


Cholesterol Level 163     


 


LDL Cholesterol, Calculated 106     


 


HDL Cholesterol 42     


 


Cholesterol/HDL Ratio 3.8     


 


Thyroid Stimulating Hormone


(TSH) 2.020  


  


  


  


 


 


Free Thyroxine 1.24     


 


Bedside Glucose  166   183   


 


Creatine Kinase    37  


 


Creatine Kinase Index    5.8  


 


Creatinine Kinase MB (Mass)    2.13  











Medications


Medications





 Current Medications


Hydralazine HCl (Apresoline) 10 mg Q6H  PRN IV SBP>170 Last administered on 11/ 17/17at 03:40; Admin Dose 10 MG;  Start 11/16/17 at 15:30


Insulin Glargine (Lantus) 21 unit DAILY@20 SC  Last administered on 11/16/17at 

20:58; Admin Dose 21 UNIT;  Start 11/16/17 at 20:00


Diagnostic Test (Pha) (Accu-Chek) 1 ea 02 XX ;  Start 11/17/17 at 02:00


Donepezil HCl (Aricept) 10 mg DAILY PO  Last administered on 11/17/17at 09:36; 

Admin Dose 10 MG;  Start 11/17/17 at 09:00


Lisinopril (Zestril) 40 mg DAILY PO  Last administered on 11/17/17at 09:36; 

Admin Dose 40 MG;  Start 11/17/17 at 09:00


Ranitidine HCl (Zantac) 300 mg HS PO  Last administered on 11/16/17at 20:52; 

Admin Dose 300 MG;  Start 11/16/17 at 21:00


Tramadol HCl (Ultram) 50 mg Q6H  PRN PO PAIN;  Start 11/16/17 at 16:00


Ondansetron HCl (Zofran Inj) 4 mg Q6H  PRN IV NAUSEA AND/OR VOMITING;  Start 11/ 16/17 at 16:00


Aspirin (Aspirin) 81 mg DAILY PO  Last administered on 11/17/17at 09:36; Admin 

Dose 81 MG;  Start 11/17/17 at 09:00


Acetaminophen (Tylenol Tab) 650 mg Q6H  PRN PO PAIN LEVEL 1-3 OR FEVER;  Start 

11/16/17 at 16:00


Morphine Sulfate (morphine) 2 mg Q4H  PRN IV PAIN LEVEL 7-10;  Start 11/16/17 

at 16:00


Enoxaparin Sodium (Lovenox) 30 mg DAILY SC  Last administered on 11/17/17at 09:

43; Admin Dose 30 MG;  Start 11/17/17 at 09:00


Miscellaneous Information 1 ea NOTE XX ;  Start 11/16/17 at 16:00


Glucose (Glutose) 15 gm Q15M  PRN PO DECREASED GLUCOSE;  Start 11/16/17 at 16:00


Glucose (Glutose) 22.5 gm Q15M  PRN PO DECREASED GLUCOSE;  Start 11/16/17 at 16:

00


Dextrose (D50w Syringe) 25 ml Q15M  PRN IV DECREASED GLUCOSE;  Start 11/16/17 

at 16:00


Dextrose (D50w Syringe) 50 ml Q15M  PRN IV DECREASED GLUCOSE;  Start 11/16/17 

at 16:00


Glucagon (Glucagen) 1 mg Q15M  PRN IM DECREASED GLUCOSE;  Start 11/16/17 at 16:

00


Glucose (Glutose) 15 gm Q15M  PRN BUCCAL DECREASED GLUCOSE;  Start 11/16/17 at 

16:00


Metoprolol Tartrate (Lopressor) 25 mg BID PO ;  Start 11/17/17 at 21:00











KEO BENSON MD Nov 17, 2017 13:12

## 2017-11-17 NOTE — RADRPT
PROCEDURE:   MR Cervical Spine with and without contrast. 

 

CLINICAL INDICATION:  Upper extremity weakness, hypertension, history of CVA.  

 

TECHNIQUE:   An MRI of the cervical spine was performed utilizing the following sequences: Pre and p
ostcontrast sagittal and axial T1 weighted, sagittal and axial T2 weighted, and sagittal T2 weighted
 with fat saturation 10 cc Magnevist were given intravenously without complication. 

 

COMPARISON:  MR 11/16/2017

 

FINDINGS:

There is a normal lordosis of the cervical spine.  Trace retrolisthesis of C3 relative to C4 is note
d.  The vertebral bodies are heterogeneous in signal intensity. Vertebral body height is uniform.  T
he cervical cord is normal in signal intensity. The craniocervical junction is unremarkable. 

 

C2-3: Mild disc space loss and desiccation is present. No evidence of significant disc bulge or donato
iation. Minimal posterior central spondylotic changes measure 2 mm. There are bilateral facet joint 
degenerative changes. The central canal and foramina are adequately patent.  

 

C3-4: Moderately severe disc space loss with disc dessication.  Broad-based disc osteophyte complex 
measures up to 2.5 mm in the midline and contacts the ventral cervical spinal cord. There is moderat
e central canal stenosis with AP diameter measuring 8 mm. Bilateral uncovertebral, right greater herrera
n left and facet joint degenerative changes contribute to severe bilateral foraminal, right greater 
than left narrowing.

 

C4-5: Moderately severe disc space loss with disc dessication.  Disc osteophyte complex eccentric to
 the right of midline measures 2.2 mm in maximal AP diameter. There is deformity of the ventral cerv
ical spinal cord demonstrated on axial T1 image 15. There is severe central canal stenosis with the 
AP diameter measuring approximately 6.5 mm. Bilateral uncovertebral and facet joint degenerative angie
nges with mild right foraminal narrowing.

 

C5-6: There is severe disc space loss , desiccation and endplate degenerative changes. Broad-based d
isc osteophyte ridge measures approximately 3 mm in AP diameter and mildly compresses the cervical s
tomeka cord.  There is severe central canal stenosis with AP diameter measuring 6.5 mm. Bilateral unc
overtebral and facet joint degenerative changes result in moderately severe left and mild right fora
christopher narrowing.

 

C6-7: There is mild disc space loss with disc dessication. Broad-based disc osteophyte ridge with ri
ght subarticular protrusion measuring 2 mm in diameter is present. No evidence of cervical spinal co
rd compression. Mild central canal narrowing with the AP diameter measuring 9.5 mm. Bilateral uncove
rtebral and facet joint degenerative changes with mild left foraminal narrowing.  

 

C7-T1: The intervertebral disc is normal without foraminal or canal narrowing. Mild disc space loss 
and desiccation is present. Minimal disc osteophyte ridge is noted centrally measuring approximately
 2 mm. The central canal and foramina are adequately patent.

 

The thyroid gland is diffusely heterogeneous. 

 

IMPRESSION: 

1.  C5-C6:  Severe central canal stenosis with AP diameter measuring 6.5 mm secondary to a broad-bas
ed disc osteophyte ridge which mildly compresses the cervical spinal cord. Moderately severe left an
d mild right foraminal narrowing.

2.  C4-C5:  Severe central canal stenosis secondary to a 2.2 mm disc osteophyte ridge centered to th
e right of midline which deforms the ventral cervical spinal cord. Mild right foraminal narrowing is
 present.

3.  C3-C4:  Moderate central canal stenosis, severe bilateral foraminal, right greater than left maye
rowing.

4.  C6-C7:  Mild central canal narrowing and mild left foraminal narrowing.

 

RPTAT: H[facility]R

_____________________________________________ 

Physician Dorita           Date    Time 

Electronically viewed and signed by Physician Dorita on 11/17/2017 09:05 

 

D:  11/17/2017 09:05  T:  11/17/2017 09:05

/

## 2017-11-17 NOTE — RADRPT
PROCEDURE:   MR Brain with and without contrast. 

 

CLINICAL INDICATION: Neck pain, left upper extremity weakness, hypertension, history of CVA.

 

TECHNIQUE:   An MRI of the brain was performed utilizing the following sequences: Sagittal and axial
 T1 weighted, axial T2 weighted, axial FLAIR, coronal GRE, and axial diffusion weighted with ADC map
ping. Additionally, postcontrast coronal and axial T1-weighted sequences were performed after 10 cc 
of Gadolinium were given intravenously without complication. 

 

COMPARISON:  CT brain 11/16/2017.

 

FINDINGS:

There is no acute intracranial hemorrhage, midline shift, mass effect or acute ischemic infarct. The
 cerebellar tonsils are normal in location.

 

Moderate generalized parenchymal volume loss is present. There is a chronic infarct within the media
l right occipital lobe with high signal on T1-weighted images representing either laminar necrosis o
r high proteinaceous deposition. Chronic lacunar infarcts are present within the posterior inferior 
right cerebellar hemisphere in addition to the body and head of the left caudate nucleus. Dilatation
 of the left frontal horn is associated with the above-mentioned remote left caudate nucleus infarct
. There is no evidence of hydrocephalus. Multifocal punctate and confluent hyperintense signal is de
monstrated on FLAIR and T2-weighted images within the bilateral subcortical and deep white matter co
mpatible with moderate microvascular white matter ischemic disease. Normal variation partially empty
 sella is noted. No space occupying intra-axial masses or extra-axial fluid collections are present.

 

Flow voids persist within the internal carotid, vertebral, and basilar arteries.

 

No opacification of the mastoid or paranasal sinuses.

 

IMPRESSION:

 

1. No acute CVA.

2.  Remote infarcts within the medial right occipital lobe, the posterior inferior right cerebellar 
hemisphere and left caudate nucleus.

3.  Moderate microvascular white matter ischemic disease.

4.  Moderate generalized parenchymal volume loss.

5.  Variation partially empty sella.

 

RPTAT: HRSR

_____________________________________________ 

Physician Dorita           Date    Time 

Electronically viewed and signed by Physician Dorita on 11/17/2017 03:35 

 

D:  11/17/2017 03:35  T:  11/17/2017 03:35

RR/

## 2017-11-18 VITALS — DIASTOLIC BLOOD PRESSURE: 70 MMHG | RESPIRATION RATE: 17 BRPM | SYSTOLIC BLOOD PRESSURE: 159 MMHG

## 2017-11-18 VITALS — DIASTOLIC BLOOD PRESSURE: 62 MMHG | HEART RATE: 84 BPM | SYSTOLIC BLOOD PRESSURE: 130 MMHG

## 2017-11-18 VITALS — SYSTOLIC BLOOD PRESSURE: 200 MMHG | DIASTOLIC BLOOD PRESSURE: 90 MMHG | RESPIRATION RATE: 18 BRPM

## 2017-11-18 VITALS — SYSTOLIC BLOOD PRESSURE: 162 MMHG | DIASTOLIC BLOOD PRESSURE: 70 MMHG

## 2017-11-18 VITALS — SYSTOLIC BLOOD PRESSURE: 189 MMHG | DIASTOLIC BLOOD PRESSURE: 75 MMHG | RESPIRATION RATE: 17 BRPM

## 2017-11-18 VITALS — HEART RATE: 81 BPM

## 2017-11-18 VITALS — HEART RATE: 79 BPM | SYSTOLIC BLOOD PRESSURE: 125 MMHG | DIASTOLIC BLOOD PRESSURE: 58 MMHG

## 2017-11-18 VITALS — DIASTOLIC BLOOD PRESSURE: 81 MMHG | SYSTOLIC BLOOD PRESSURE: 196 MMHG

## 2017-11-18 VITALS — HEART RATE: 88 BPM

## 2017-11-18 VITALS — HEART RATE: 63 BPM

## 2017-11-18 VITALS — RESPIRATION RATE: 18 BRPM | SYSTOLIC BLOOD PRESSURE: 187 MMHG | DIASTOLIC BLOOD PRESSURE: 77 MMHG

## 2017-11-18 VITALS — HEART RATE: 85 BPM

## 2017-11-18 LAB
ANION GAP SERPL CALC-SCNC: 14 MMOL/L (ref 8–16)
BASOPHILS # BLD AUTO: 0 10^3/UL (ref 0–0.1)
BASOPHILS NFR BLD: 0.3 % (ref 0–2)
BUN SERPL-MCNC: 19 MG/DL (ref 7–20)
CALCIUM SERPL-MCNC: 10.5 MG/DL (ref 8.4–10.2)
CHLORIDE SERPL-SCNC: 101 MMOL/L (ref 97–110)
CO2 SERPL-SCNC: 28 MMOL/L (ref 21–31)
CREAT SERPL-MCNC: 0.81 MG/DL (ref 0.44–1)
EOSINOPHIL # BLD: 0 10^3/UL (ref 0–0.5)
EOSINOPHIL NFR BLD: 0.3 % (ref 0–7)
ERYTHROCYTE [DISTWIDTH] IN BLOOD BY AUTOMATED COUNT: 14 % (ref 11.5–14.5)
GLUCOSE SERPL-MCNC: 158 MG/DL (ref 70–220)
HCT VFR BLD CALC: 40.9 % (ref 37–47)
HGB BLD-MCNC: 13.4 G/DL (ref 12–16)
LYMPHOCYTES # BLD AUTO: 1.9 10^3/UL (ref 0.8–2.9)
LYMPHOCYTES NFR BLD AUTO: 20.4 % (ref 15–51)
MCH RBC QN AUTO: 29.3 PG (ref 29–33)
MCHC RBC AUTO-ENTMCNC: 32.8 G/DL (ref 32–37)
MCV RBC AUTO: 89.3 FL (ref 82–101)
MONOCYTES # BLD: 0.5 10^3/UL (ref 0.3–0.9)
MONOCYTES NFR BLD: 4.9 % (ref 0–11)
NEUTROPHILS # BLD: 6.8 10^3/UL (ref 1.6–7.5)
NEUTROPHILS NFR BLD AUTO: 73.6 % (ref 39–77)
NRBC # BLD MANUAL: 0 10^3/UL (ref 0–0)
NRBC BLD AUTO-RTO: 0 /100WBC (ref 0–0)
PLATELET # BLD: 245 10^3/UL (ref 140–415)
PMV BLD AUTO: 10.1 FL (ref 7.4–10.4)
POTASSIUM SERPL-SCNC: 4.1 MMOL/L (ref 3.5–5.1)
RBC # BLD AUTO: 4.58 10^6/UL (ref 4.2–5.4)
SODIUM SERPL-SCNC: 139 MMOL/L (ref 135–144)
WBC # BLD AUTO: 9.3 10^3/UL (ref 4.8–10.8)

## 2017-11-18 RX ADMIN — DEXAMETHASONE SODIUM PHOSPHATE PRN MG: 10 INJECTION, SOLUTION INTRAMUSCULAR; INTRAVENOUS at 12:36

## 2017-11-18 RX ADMIN — RANITIDINE SCH MG: 150 TABLET ORAL at 20:09

## 2017-11-18 RX ADMIN — DONEPEZIL HYDROCHLORIDE SCH MG: 10 TABLET ORAL at 08:54

## 2017-11-18 RX ADMIN — ASPIRIN 81 MG SCH MG: 81 TABLET ORAL at 08:54

## 2017-11-18 RX ADMIN — INSULIN GLARGINE SCH UNIT: 100 INJECTION, SOLUTION SUBCUTANEOUS at 20:25

## 2017-11-18 RX ADMIN — POLYETHYLENE GLYCOL 3350 SCH GM: 17 POWDER, FOR SOLUTION ORAL at 09:44

## 2017-11-18 RX ADMIN — DEXAMETHASONE SODIUM PHOSPHATE PRN MG: 10 INJECTION, SOLUTION INTRAMUSCULAR; INTRAVENOUS at 20:13

## 2017-11-18 RX ADMIN — OXYCODONE HYDROCHLORIDE SCH MG: 10 TABLET, FILM COATED, EXTENDED RELEASE ORAL at 09:44

## 2017-11-18 RX ADMIN — LISINOPRIL SCH MG: 20 TABLET ORAL at 08:54

## 2017-11-18 RX ADMIN — CEFTRIAXONE SCH MLS/HR: 1 INJECTION, SOLUTION INTRAVENOUS at 17:36

## 2017-11-18 RX ADMIN — ENOXAPARIN SODIUM SCH MG: 100 INJECTION SUBCUTANEOUS at 08:58

## 2017-11-18 RX ADMIN — ALPRAZOLAM PRN MG: 0.5 TABLET ORAL at 22:07

## 2017-11-18 RX ADMIN — METOPROLOL TARTRATE SCH MG: 25 TABLET, FILM COATED ORAL at 08:55

## 2017-11-18 RX ADMIN — METOPROLOL TARTRATE SCH MG: 25 TABLET, FILM COATED ORAL at 20:09

## 2017-11-18 NOTE — RADRPT
Echocardiogram Report

 

Patient Name:  TOSIN FLOWERS  Gender:       Female

MRN:           [id number]                Accession #:  [id number]

Birth Date:    1940           Study Date:   17-Nov-2017

Sonographer:   BAR Casey         Location:     [id number]

 

Ref. Physician: ALIRIO MC

Quality: Good

 

Procedures: Transthoracic echocardiogram with complete 2D, M-Mode, and 

doppler examination.

Indications: Evaluate Left Ventricular function.

 

2D/M Mode                          Doppler

Measurement  Value  Normal Ranges  Measurement    Value  Normal Ranges

LVIDd MM     4.9    cm             NIKIA Vmax       2.4    cm2

LVIDs MM     3.0    cm             NIKIA VTI        2.7    cm2

FS MM        38.1   %              AV Mean Mitchell    1.3    m/sec

LVPWd MM     1.2    cm             AV Mean PG     8.0    mmHg

LVPWs MM     1.8    cm             AV Peak Mitchell    1.8    m/sec

LVPW % MM    50     %              AV Peak PG     13.0   mmHg

IVSd MM      1.2    cm             AV VTI         28.3   cm

IVS/LVPW MM  1.0                   LVOT Mean Mitchell  0.8    m/sec

AoR Diam MM  3.3    cm             LVOT Mean PG   3.0    mmHg

LA/Ao MM     1.1                   LVOT Peak Mitchell  1.3    m/sec

EDV MM       116.0  cm3            LVOT Peak PG   6.0    mmHg

ESV MM       27.5   cm3            LVOT VTI       22.0   cm

SV MM        88.5   cm3            MV E Peak Mitchell  0.7    m/sec

LA Dimen MM  3.7    cm             MV A Peak Mitchell  1.4    m/sec

SV MM        88.5   cm3            MV E/A         0.5

LA Dimen 2D  3.7    2.3 - 4.0 cm   MV Decel Time  306    msec

LVOT Diam    2.1    cm             MV E/A         0.5

LVOT Area    3.5    cm2            TR Peak Mitchell    2.4    m/sec

TR Peak PG     22.0   mmHg

RVSP           25.0   mmHg

 

Findings

Left Ventricle: Hyperdynamic left ventricular systolic function.  Normal 

left ventricular cavity size.  Mild concentric left ventricular 

hypertrophy.  Ejection fraction is visually estimated at >65 %.  

Abnormal Diastolic Function.

Right Ventricle: Normal right ventricular size.  Normal right 

ventricular systolic function.

Left Atrium: The left atrium is normal in size.

Right Atrium: The right atrium is normal in size.

Mitral Valve: Normal appearance and function of the mitral valve with 

trace physiologic regurgitation.

Aortic Valve: Normal appearance of the aortic valve.  No significant 

aortic stenosis or insufficiency.

Tricuspid Valve: Normal appearance of the tricuspid valve.  Estimated 

peak PA systolic pressure 25 mmHg.  There is mild tricuspid 

regurgitation.

Pulmonic Valve: Normal pulmonic valve appearance.

Pericardium: Normal pericardium with no significant pericardial effusion.

Aorta: Normal aortic root.

IVC: Normal size and normal respiratory collapse consistent with normal 

right atrial pressure.

Pulmonary Artery: Normal pulmonary artery size.

 

Conclusions

1.Hyperdynamic left ventricular systolic function.  Normal left 

ventricular cavity size.  Mild concentric left ventricular hypertrophy.  

Ejection fraction is visually estimated at >65 %.  Abnormal Diastolic 

Function.

2.Normal appearance and function of the mitral valve with trace 

physiologic regurgitation.

3.Normal appearance of the tricuspid valve.  Estimated peak PA systolic 

pressure 25 mmHg.  There is mild tricuspid regurgitation.

 

Electronically Signed By:

Jose Maria Eldridge

18-Nov-2017 18:51:27  -0800

 

Patient Name: TOSIN FLOWERS

MRN: [id number]

Study Date: 17-Nov-2017

 

[id number]

## 2017-11-18 NOTE — PN
Date/Time of Note


Date/Time of Note


DATE: 11/18/17 


TIME: 15:16





Assessment/Plan


VTE Prophylaxis


VTE Prophylaxis Intervention:  other





Lines/Catheters


IV Catheter Type (from Nrs):  Saline Lock


Urinary Cath still in place:  No





Assessment/Plan


Assessment/Plan


-Cervical radiculopathy with central canal stenosis, Dr. Escoto neurology 

consult is appreciated.  Dr. Pennington is asked to see patient for pain 

management.


-Hypertensive urgency, continue hydralazine


-Elevated troponin. Dr. Eldridge is following in cardiology consultation.


-Diabetes mellitus, continue Lantus, pre-meal NovoLog and NovoLog per sliding 

scale.


-Obesity with BMI of 35.2


-History of gastritis, continue PPI





Further recommendations based on clinical course.  Plan of care discussed with 

Dr. Maza





Subjective


24 Hr Interval Summary


Free Text/Dictation


c/o dry cough, CTABL, DENIES ANY CHEST PAIN, nad, daughter at bed side- all Qs 

ANSWERED, DW STAFF


Respiratory:  cough, no complaints


Cardiovascular:  no complaints


Gastrointestinal:  no complaints


Genitourinary:  no complaints


Musculoskeletal:  no complaints





Exam/Review of Systems


Vital Signs


Vitals





 Vital Signs








  Date Time  Temp Pulse Resp B/P Pulse Ox O2 Delivery O2 Flow Rate FiO2


 


11/18/17 12:49  63      


 


11/18/17 11:40      Room Air  


 


11/18/17 11:07 98.6  17 189/75 97   


 


11/18/17 08:28       2.0 














 Intake and Output   


 


 11/17/17 11/17/17 11/18/17





 15:00 23:00 07:00


 


Intake Total 200 ml  300 ml


 


Balance 200 ml  300 ml











Exam


Constitutional:  alert, well developed


Respiratory:  clear to auscultation, normal air movement


Cardiovascular:  nl pulses, other (S1S2)


Gastrointestinal:  non-tender, soft


Musculoskeletal:  nl extremities to inspection


Extremities:  normal pulses


Neurological:  confused





Results


Result Diagram:  


11/18/17 0813                                                                  

              11/18/17 0813





Results 24 hrs





Laboratory Tests








Test


  11/17/17


16:13 11/17/17


17:44 11/17/17


17:47 11/17/17


20:50


 


Bedside Glucose 163   165    173  


 


Creatine Kinase   40   


 


Creatine Kinase Index   6.6   


 


Creatinine Kinase MB (Mass)   2.63  H 


 


Troponin I   0.280  *H 














Test


  11/18/17


08:01 11/18/17


08:13 11/18/17


12:00 


 


 


Bedside Glucose 138    144   


 


White Blood Count  9.3  #  


 


Red Blood Count  4.58    


 


Hemoglobin  13.4    


 


Hematocrit  40.9    


 


Mean Corpuscular Volume  89.3    


 


Mean Corpuscular Hemoglobin  29.3    


 


Mean Corpuscular Hemoglobin


Concent 


  32.8  


  


  


 


 


Red Cell Distribution Width  14.0    


 


Platelet Count  245    


 


Mean Platelet Volume  10.1    


 


Neutrophils %  73.6    


 


Lymphocytes %  20.4    


 


Monocytes %  4.9    


 


Eosinophils %  0.3    


 


Basophils %  0.3    


 


Nucleated Red Blood Cells %  0.0    


 


Neutrophils #  6.8    


 


Lymphocytes #  1.9    


 


Monocytes #  0.5    


 


Eosinophils #  0.0    


 


Basophils #  0.0    


 


Nucleated Red Blood Cells #  0.0    


 


Sodium Level  139    


 


Potassium Level  4.1    


 


Chloride Level  101    


 


Carbon Dioxide Level  28    


 


Anion Gap  14    


 


Blood Urea Nitrogen  19    


 


Creatinine  0.81    


 


Glucose Level  158    


 


Calcium Level  10.5  H  











Medications


Medications





 Current Medications


Hydralazine HCl (Apresoline) 10 mg Q6H  PRN IV SBP>170 Last administered on 11/ 17/17at 17:21; Admin Dose 10 MG;  Start 11/16/17 at 15:30


Insulin Glargine (Lantus) 21 unit DAILY@20 SC  Last administered on 11/17/17at 

21:02; Admin Dose 21 UNIT;  Start 11/16/17 at 20:00


Diagnostic Test (Pha) (Accu-Chek) 1 ea 02 XX ;  Start 11/17/17 at 02:00


Donepezil HCl (Aricept) 10 mg DAILY PO  Last administered on 11/18/17at 08:54; 

Admin Dose 10 MG;  Start 11/17/17 at 09:00


Lisinopril (Zestril) 40 mg DAILY PO  Last administered on 11/18/17at 08:54; 

Admin Dose 40 MG;  Start 11/17/17 at 09:00


Ranitidine HCl (Zantac) 300 mg HS PO  Last administered on 11/17/17at 20:48; 

Admin Dose 300 MG;  Start 11/16/17 at 21:00


Ondansetron HCl (Zofran Inj) 4 mg Q6H  PRN IV NAUSEA AND/OR VOMITING Last 

administered on 11/18/17at 12:36; Admin Dose 4 MG;  Start 11/16/17 at 16:00


Aspirin (Aspirin) 81 mg DAILY PO  Last administered on 11/18/17at 08:54; Admin 

Dose 81 MG;  Start 11/17/17 at 09:00


Acetaminophen (Tylenol Tab) 650 mg Q6H  PRN PO PAIN LEVEL 1-3 OR FEVER;  Start 

11/16/17 at 16:00


Enoxaparin Sodium (Lovenox) 30 mg DAILY SC  Last administered on 11/18/17at 08:

58; Admin Dose 30 MG;  Start 11/17/17 at 09:00


Miscellaneous Information 1 ea NOTE XX ;  Start 11/16/17 at 16:00


Glucose (Glutose) 15 gm Q15M  PRN PO DECREASED GLUCOSE;  Start 11/16/17 at 16:00


Glucose (Glutose) 22.5 gm Q15M  PRN PO DECREASED GLUCOSE;  Start 11/16/17 at 16:

00


Dextrose (D50w Syringe) 25 ml Q15M  PRN IV DECREASED GLUCOSE;  Start 11/16/17 

at 16:00


Dextrose (D50w Syringe) 50 ml Q15M  PRN IV DECREASED GLUCOSE;  Start 11/16/17 

at 16:00


Glucagon (Glucagen) 1 mg Q15M  PRN IM DECREASED GLUCOSE;  Start 11/16/17 at 16:

00


Glucose (Glutose) 15 gm Q15M  PRN BUCCAL DECREASED GLUCOSE;  Start 11/16/17 at 

16:00


Metoprolol Tartrate 25 mg 25 mg BID PO  Last administered on 11/18/17at 08:55; 

Admin Dose 25 MG;  Start 11/17/17 at 21:00


Ceftriaxone Sodium (Rocephin) 50 ml @  100 mls/hr Q24H IVPB  Last administered 

on 11/17/17at 21:17; Admin Dose 100 MLS/HR;  Start 11/17/17 at 18:00


Clonidine (Catapres) 0.1 mg Q4H  PRN PO SBP>170 Last administered on 11/18/17at 

12:33; Admin Dose 0.1 MG;  Start 11/17/17 at 18:00


Tramadol HCl (Ultram) 50 mg Q6H PO  Last administered on 11/18/17at 09:43; 

Admin Dose 50 MG;  Start 11/18/17 at 10:00


Oxycodone HCl (Oxycontin) 10 mg AM PO  Last administered on 11/18/17at 09:44; 

Admin Dose 10 MG;  Start 11/18/17 at 09:00


Polyethylene Glycol (Miralax) 17 gm DAILY PO  Last administered on 11/18/17at 09

:44; Admin Dose 17 GM;  Start 11/18/17 at 09:00


Hydralazine HCl (Apresoline) 50 mg Q8 PO  Last administered on 11/18/17at 12:32

; Admin Dose 50 MG;  Start 11/18/17 at 12:30


Alprazolam (Xanax) 0.5 mg Q8H  PRN PO ANXIETY;  Start 11/18/17 at 11:30











IMAN HOANG Nov 18, 2017 15:19

## 2017-11-18 NOTE — CONS
Date/Time of Note


Date/Time of Note


DATE: 11/18/17 


TIME: 08:51





Consultation Date/Type/Reason


Admit Date/Time


Nov 16, 2017 at 10:20


Type of Consultation:  Pain managent





Hx of Present Illness


Brief note


Chart reviewed pt in unavailable at this time. I have reviewed MRI of the spine 

and chart in detail...


fOR NOW I will change to scheduled doses of ultram and oxycontin .Will wait for 

neuro consultation, soft collar


and PT consultation if ok with neurosurg....


I will return


Psychological:  no complaints





Past Medical History


Medical History:  diabetes, hypertension





Social History


Alcohol Use:  none


Smoking Status:  Never smoker


Drug Use:  none





Exam/Review of Systems


Vital Signs


Vitals





 Vital Signs








  Date Time  Temp Pulse Resp B/P Pulse Ox O2 Delivery O2 Flow Rate FiO2


 


11/18/17 08:30 98.3 84 18 200/90 95   


 


11/18/17 08:28      Nasal Cannula 2.0 














 Intake and Output   


 


 11/17/17 11/17/17 11/18/17





 15:00 23:00 07:00


 


Intake Total 200 ml  300 ml


 


Balance 200 ml  300 ml











Results


Result Diagram:  


11/17/17 0616                                                                  

              11/17/17 0616





Results 24 hrs





Laboratory Tests








Test


  11/17/17


11:35 11/17/17


12:08 11/17/17


16:13 11/17/17


17:44


 


Bedside Glucose 183    163   165  


 


Creatine Kinase  37    


 


Creatine Kinase Index  5.8    


 


Creatinine Kinase MB (Mass)  2.13    


 


Troponin I  0.180  *H  














Test


  11/17/17


17:47 11/17/17


20:50 11/18/17


08:01 11/18/17


08:13


 


Creatine Kinase 40     


 


Creatine Kinase Index 6.6     


 


Creatinine Kinase MB (Mass) 2.63  H   


 


Troponin I 0.280  *H   


 


Bedside Glucose  173   138   


 


White Blood Count    9.3  #


 


Red Blood Count    4.58  


 


Hemoglobin    13.4  


 


Hematocrit    40.9  


 


Mean Corpuscular Volume    89.3  


 


Mean Corpuscular Hemoglobin    29.3  


 


Mean Corpuscular Hemoglobin


Concent 


  


  


  32.8  


 


 


Red Cell Distribution Width    14.0  


 


Platelet Count    245  


 


Mean Platelet Volume    10.1  


 


Neutrophils %    73.6  


 


Lymphocytes %    20.4  


 


Monocytes %    4.9  


 


Eosinophils %    0.3  


 


Basophils %    0.3  


 


Nucleated Red Blood Cells %    0.0  


 


Neutrophils #    6.8  


 


Lymphocytes #    1.9  


 


Monocytes #    0.5  


 


Eosinophils #    0.0  


 


Basophils #    0.0  


 


Nucleated Red Blood Cells #    0.0  











Medications


Medications





 Current Medications


Hydralazine HCl (Apresoline) 10 mg Q6H  PRN IV SBP>170 Last administered on 11/ 17/17at 17:21; Admin Dose 10 MG;  Start 11/16/17 at 15:30


Insulin Glargine (Lantus) 21 unit DAILY@20 SC  Last administered on 11/17/17at 

21:02; Admin Dose 21 UNIT;  Start 11/16/17 at 20:00


Diagnostic Test (Pha) (Accu-Chek) 1 ea 02 XX ;  Start 11/17/17 at 02:00


Donepezil HCl (Aricept) 10 mg DAILY PO  Last administered on 11/17/17at 09:36; 

Admin Dose 10 MG;  Start 11/17/17 at 09:00


Lisinopril (Zestril) 40 mg DAILY PO  Last administered on 11/17/17at 09:36; 

Admin Dose 40 MG;  Start 11/17/17 at 09:00


Ranitidine HCl (Zantac) 300 mg HS PO  Last administered on 11/17/17at 20:48; 

Admin Dose 300 MG;  Start 11/16/17 at 21:00


Tramadol HCl (Ultram) 50 mg Q6H  PRN PO PAIN;  Start 11/16/17 at 16:00


Ondansetron HCl (Zofran Inj) 4 mg Q6H  PRN IV NAUSEA AND/OR VOMITING;  Start 11/ 16/17 at 16:00


Aspirin (Aspirin) 81 mg DAILY PO  Last administered on 11/17/17at 09:36; Admin 

Dose 81 MG;  Start 11/17/17 at 09:00


Acetaminophen (Tylenol Tab) 650 mg Q6H  PRN PO PAIN LEVEL 1-3 OR FEVER;  Start 

11/16/17 at 16:00


Morphine Sulfate (morphine) 2 mg Q4H  PRN IV PAIN LEVEL 7-10 Last administered 

on 11/17/17at 18:07; Admin Dose 2 MG;  Start 11/16/17 at 16:00


Enoxaparin Sodium (Lovenox) 30 mg DAILY SC  Last administered on 11/17/17at 09:

43; Admin Dose 30 MG;  Start 11/17/17 at 09:00


Miscellaneous Information 1 ea NOTE XX ;  Start 11/16/17 at 16:00


Glucose (Glutose) 15 gm Q15M  PRN PO DECREASED GLUCOSE;  Start 11/16/17 at 16:00


Glucose (Glutose) 22.5 gm Q15M  PRN PO DECREASED GLUCOSE;  Start 11/16/17 at 16:

00


Dextrose (D50w Syringe) 25 ml Q15M  PRN IV DECREASED GLUCOSE;  Start 11/16/17 

at 16:00


Dextrose (D50w Syringe) 50 ml Q15M  PRN IV DECREASED GLUCOSE;  Start 11/16/17 

at 16:00


Glucagon (Glucagen) 1 mg Q15M  PRN IM DECREASED GLUCOSE;  Start 11/16/17 at 16:

00


Glucose (Glutose) 15 gm Q15M  PRN BUCCAL DECREASED GLUCOSE;  Start 11/16/17 at 

16:00


Metoprolol Tartrate 25 mg 25 mg BID PO  Last administered on 11/17/17at 20:48; 

Admin Dose 25 MG;  Start 11/17/17 at 21:00


Ceftriaxone Sodium (Rocephin) 50 ml @  100 mls/hr Q24H IVPB  Last administered 

on 11/17/17at 21:17; Admin Dose 100 MLS/HR;  Start 11/17/17 at 18:00


Hydralazine HCl (Apresoline) 25 mg Q8 PO  Last administered on 11/18/17at 05:36

; Admin Dose 25 MG;  Start 11/17/17 at 18:06


Clonidine (Catapres) 0.1 mg Q4H  PRN PO SBP>170 Last administered on 11/17/17at 

18:09; Admin Dose 0.1 MG;  Start 11/17/17 at 18:00











DEANA TELLO Nov 18, 2017 08:58

## 2017-11-19 VITALS — DIASTOLIC BLOOD PRESSURE: 70 MMHG | SYSTOLIC BLOOD PRESSURE: 166 MMHG

## 2017-11-19 VITALS — RESPIRATION RATE: 17 BRPM | DIASTOLIC BLOOD PRESSURE: 78 MMHG | SYSTOLIC BLOOD PRESSURE: 181 MMHG

## 2017-11-19 VITALS — DIASTOLIC BLOOD PRESSURE: 74 MMHG | RESPIRATION RATE: 18 BRPM | SYSTOLIC BLOOD PRESSURE: 169 MMHG

## 2017-11-19 VITALS — DIASTOLIC BLOOD PRESSURE: 62 MMHG | SYSTOLIC BLOOD PRESSURE: 189 MMHG | RESPIRATION RATE: 17 BRPM

## 2017-11-19 VITALS — DIASTOLIC BLOOD PRESSURE: 66 MMHG | RESPIRATION RATE: 17 BRPM | SYSTOLIC BLOOD PRESSURE: 154 MMHG

## 2017-11-19 VITALS — SYSTOLIC BLOOD PRESSURE: 191 MMHG | DIASTOLIC BLOOD PRESSURE: 82 MMHG

## 2017-11-19 VITALS — SYSTOLIC BLOOD PRESSURE: 156 MMHG | DIASTOLIC BLOOD PRESSURE: 72 MMHG | RESPIRATION RATE: 18 BRPM

## 2017-11-19 VITALS — RESPIRATION RATE: 18 BRPM | SYSTOLIC BLOOD PRESSURE: 153 MMHG | DIASTOLIC BLOOD PRESSURE: 67 MMHG

## 2017-11-19 VITALS — SYSTOLIC BLOOD PRESSURE: 141 MMHG | DIASTOLIC BLOOD PRESSURE: 65 MMHG

## 2017-11-19 VITALS — HEART RATE: 90 BPM

## 2017-11-19 VITALS — HEART RATE: 64 BPM

## 2017-11-19 VITALS — HEART RATE: 74 BPM

## 2017-11-19 VITALS — HEART RATE: 89 BPM

## 2017-11-19 VITALS — HEART RATE: 65 BPM

## 2017-11-19 VITALS — HEART RATE: 71 BPM

## 2017-11-19 LAB
ANION GAP SERPL CALC-SCNC: 11 MMOL/L (ref 8–16)
BASOPHILS # BLD AUTO: 0 10^3/UL (ref 0–0.1)
BASOPHILS NFR BLD: 0.3 % (ref 0–2)
BUN SERPL-MCNC: 26 MG/DL (ref 7–20)
CALCIUM SERPL-MCNC: 10.9 MG/DL (ref 8.4–10.2)
CHLORIDE SERPL-SCNC: 101 MMOL/L (ref 97–110)
CK MB SERPL-MCNC: 0.84 NG/ML (ref 0–2.4)
CK SERPL-CCNC: 35 IU/L (ref 23–200)
CO2 SERPL-SCNC: 29 MMOL/L (ref 21–31)
CREAT SERPL-MCNC: 1.03 MG/DL (ref 0.44–1)
EOSINOPHIL # BLD: 0.1 10^3/UL (ref 0–0.5)
EOSINOPHIL NFR BLD: 0.8 % (ref 0–7)
ERYTHROCYTE [DISTWIDTH] IN BLOOD BY AUTOMATED COUNT: 14.3 % (ref 11.5–14.5)
GLUCOSE SERPL-MCNC: 162 MG/DL (ref 70–220)
HCT VFR BLD CALC: 39.9 % (ref 37–47)
HGB BLD-MCNC: 13.2 G/DL (ref 12–16)
LYMPHOCYTES # BLD AUTO: 2.9 10^3/UL (ref 0.8–2.9)
LYMPHOCYTES NFR BLD AUTO: 33.3 % (ref 15–51)
MCH RBC QN AUTO: 29.7 PG (ref 29–33)
MCHC RBC AUTO-ENTMCNC: 33.1 G/DL (ref 32–37)
MCV RBC AUTO: 89.7 FL (ref 82–101)
MONOCYTES # BLD: 0.5 10^3/UL (ref 0.3–0.9)
MONOCYTES NFR BLD: 5.9 % (ref 0–11)
NEUTROPHILS # BLD: 5.2 10^3/UL (ref 1.6–7.5)
NEUTROPHILS NFR BLD AUTO: 59.4 % (ref 39–77)
NRBC # BLD MANUAL: 0 10^3/UL (ref 0–0)
NRBC BLD AUTO-RTO: 0 /100WBC (ref 0–0)
PLATELET # BLD: 262 10^3/UL (ref 140–415)
PMV BLD AUTO: 10.3 FL (ref 7.4–10.4)
POTASSIUM SERPL-SCNC: 4.3 MMOL/L (ref 3.5–5.1)
RBC # BLD AUTO: 4.45 10^6/UL (ref 4.2–5.4)
SODIUM SERPL-SCNC: 137 MMOL/L (ref 135–144)
TROPONIN I SERPL-MCNC: 0.13 NG/ML (ref 0–0.12)
WBC # BLD AUTO: 8.7 10^3/UL (ref 4.8–10.8)

## 2017-11-19 RX ADMIN — CEFTRIAXONE SCH MLS/HR: 1 INJECTION, SOLUTION INTRAVENOUS at 17:32

## 2017-11-19 RX ADMIN — ASPIRIN 81 MG SCH MG: 81 TABLET ORAL at 08:19

## 2017-11-19 RX ADMIN — OXYCODONE HYDROCHLORIDE SCH MG: 10 TABLET, FILM COATED, EXTENDED RELEASE ORAL at 08:18

## 2017-11-19 RX ADMIN — GABAPENTIN SCH MG: 100 CAPSULE ORAL at 21:33

## 2017-11-19 RX ADMIN — POLYETHYLENE GLYCOL 3350 SCH GM: 17 POWDER, FOR SOLUTION ORAL at 08:19

## 2017-11-19 RX ADMIN — LISINOPRIL SCH MG: 20 TABLET ORAL at 08:18

## 2017-11-19 RX ADMIN — RANITIDINE SCH MG: 150 TABLET ORAL at 20:17

## 2017-11-19 RX ADMIN — HYDRALAZINE HYDROCHLORIDE PRN MG: 20 INJECTION INTRAMUSCULAR; INTRAVENOUS at 13:02

## 2017-11-19 RX ADMIN — METOPROLOL TARTRATE SCH MG: 25 TABLET, FILM COATED ORAL at 08:18

## 2017-11-19 RX ADMIN — GABAPENTIN SCH MG: 100 CAPSULE ORAL at 15:03

## 2017-11-19 RX ADMIN — METOPROLOL TARTRATE SCH MG: 25 TABLET, FILM COATED ORAL at 20:18

## 2017-11-19 RX ADMIN — INSULIN GLARGINE SCH UNIT: 100 INJECTION, SOLUTION SUBCUTANEOUS at 20:14

## 2017-11-19 RX ADMIN — ENOXAPARIN SODIUM SCH MG: 100 INJECTION SUBCUTANEOUS at 08:21

## 2017-11-19 RX ADMIN — DEXAMETHASONE SODIUM PHOSPHATE PRN MG: 10 INJECTION, SOLUTION INTRAMUSCULAR; INTRAVENOUS at 10:19

## 2017-11-19 RX ADMIN — DONEPEZIL HYDROCHLORIDE SCH MG: 10 TABLET ORAL at 08:18

## 2017-11-19 NOTE — PN
Date/Time of Note


Date/Time of Note


DATE: 11/19/17 


TIME: 14:37





Assessment/Plan


Lines/Catheters


IV Catheter Type (from Nrsg):  Saline Lock


Urinary Cath still in place:  No





Assessment/Plan


Assessment/Plan


-Cervical radiculopathy with central canal stenosis


   -  Dr. Escoto neurology consult is appreciated- staff will contact 

neurology


   - Dr. Pennington is asked to see patient for pain management.


-Hypertensive urgency, continue hydralazine


-Elevated troponin. Dr. Eldridge is following in cardiology consultation.


-Diabetes mellitus, continue Lantus, pre-meal NovoLog and NovoLog per sliding 

scale.


-Obesity with BMI of 35.2- dietary consult 


-History of gastritis, continue PPI





Further recommendations based on clinical course.  Plan of care discussed with 

Dr. Maza





Subjective


24 Hr Interval Summary


Free Text/Dictation


nad, Troponin elevated, denies any chest pain, afebrile, Bp better now. dw staff


Respiratory:  no complaints


Cardiovascular:  no complaints


Gastrointestinal:  no complaints


Genitourinary:  no complaints


Musculoskeletal:  no complaints





Exam/Review of Systems


Vital Signs


Vitals





 Vital Signs








  Date Time  Temp Pulse Resp B/P Pulse Ox O2 Delivery O2 Flow Rate FiO2


 


11/19/17 14:06    141/65    


 


11/19/17 12:12  64      


 


11/19/17 11:59      Room Air  


 


11/19/17 10:59 98.4  17  95   


 


11/19/17 10:16       2.0 














 Intake and Output   


 


 11/18/17 11/18/17 11/19/17





 15:00 23:00 07:00


 


Intake Total  650 ml 500 ml


 


Output Total  1053 ml 


 


Balance  -403 ml 500 ml











Exam


Constitutional:  alert, obese


Cardiovascular:  nl pulses, other (s1s2)


Gastrointestinal:  non-tender, soft


Musculoskeletal:  nl extremities to inspection


Extremities:  normal pulses


Neurological:  other (alert/ awake, responsive.)





Results


Result Diagram:  


11/19/17 0608                                                                  

              11/19/17 0608





Results 24 hrs





Laboratory Tests








Test


  11/18/17


17:34 11/18/17


20:06 11/19/17


06:08 11/19/17


08:04


 


Bedside Glucose 133   136    165  


 


White Blood Count   8.7   


 


Red Blood Count   4.45   


 


Hemoglobin   13.2   


 


Hematocrit   39.9   


 


Mean Corpuscular Volume   89.7   


 


Mean Corpuscular Hemoglobin   29.7   


 


Mean Corpuscular Hemoglobin


Concent 


  


  33.1  


  


 


 


Red Cell Distribution Width   14.3   


 


Platelet Count   262   


 


Mean Platelet Volume   10.3   


 


Neutrophils %   59.4   


 


Lymphocytes %   33.3   


 


Monocytes %   5.9   


 


Eosinophils %   0.8   


 


Basophils %   0.3   


 


Nucleated Red Blood Cells %   0.0   


 


Neutrophils #   5.2   


 


Lymphocytes #   2.9   


 


Monocytes #   0.5   


 


Eosinophils #   0.1   


 


Basophils #   0.0   


 


Nucleated Red Blood Cells #   0.0   


 


Sodium Level   137   


 


Potassium Level   4.3   


 


Chloride Level   101   


 


Carbon Dioxide Level   29   


 


Anion Gap   11   


 


Blood Urea Nitrogen   26  H 


 


Creatinine   1.03  H 


 


Glucose Level   162   


 


Calcium Level   10.9  H 


 


Creatine Kinase   35   


 


Creatine Kinase Index   2.4   


 


Creatinine Kinase MB (Mass)   0.84   


 


Troponin I   0.130  *H 














Test


  11/19/17


11:06 11/19/17


12:13 


  


 


 


Bedside Glucose 192   190    











Medications


Medications





 Current Medications


Hydralazine HCl (Apresoline) 10 mg Q6H  PRN IV SBP>170 Last administered on 11/ 19/17at 13:02; Admin Dose 10 MG;  Start 11/16/17 at 15:30


Insulin Glargine (Lantus) 21 unit DAILY@20 SC  Last administered on 11/18/17at 

20:25; Admin Dose 21 UNIT;  Start 11/16/17 at 20:00


Diagnostic Test (Pha) (Accu-Chek) 1 ea 02 XX ;  Start 11/17/17 at 02:00


Donepezil HCl (Aricept) 10 mg DAILY PO  Last administered on 11/19/17at 08:18; 

Admin Dose 10 MG;  Start 11/17/17 at 09:00


Lisinopril (Zestril) 40 mg DAILY PO  Last administered on 11/19/17at 08:18; 

Admin Dose 40 MG;  Start 11/17/17 at 09:00


Ranitidine HCl (Zantac) 300 mg HS PO  Last administered on 11/18/17at 20:09; 

Admin Dose 300 MG;  Start 11/16/17 at 21:00


Ondansetron HCl (Zofran Inj) 4 mg Q6H  PRN IV NAUSEA AND/OR VOMITING Last 

administered on 11/19/17at 10:19; Admin Dose 4 MG;  Start 11/16/17 at 16:00


Aspirin (Aspirin) 81 mg DAILY PO  Last administered on 11/19/17at 08:19; Admin 

Dose 81 MG;  Start 11/17/17 at 09:00


Acetaminophen (Tylenol Tab) 650 mg Q6H  PRN PO PAIN LEVEL 1-3 OR FEVER;  Start 

11/16/17 at 16:00


Enoxaparin Sodium (Lovenox) 30 mg DAILY SC  Last administered on 11/19/17at 08:

21; Admin Dose 30 MG;  Start 11/17/17 at 09:00


Miscellaneous Information 1 ea NOTE XX ;  Start 11/16/17 at 16:00


Glucose (Glutose) 15 gm Q15M  PRN PO DECREASED GLUCOSE;  Start 11/16/17 at 16:00


Glucose (Glutose) 22.5 gm Q15M  PRN PO DECREASED GLUCOSE;  Start 11/16/17 at 16:

00


Dextrose (D50w Syringe) 25 ml Q15M  PRN IV DECREASED GLUCOSE;  Start 11/16/17 

at 16:00


Dextrose (D50w Syringe) 50 ml Q15M  PRN IV DECREASED GLUCOSE;  Start 11/16/17 

at 16:00


Glucagon (Glucagen) 1 mg Q15M  PRN IM DECREASED GLUCOSE;  Start 11/16/17 at 16:

00


Glucose (Glutose) 15 gm Q15M  PRN BUCCAL DECREASED GLUCOSE;  Start 11/16/17 at 

16:00


Metoprolol Tartrate 25 mg 25 mg BID PO  Last administered on 11/19/17at 08:18; 

Admin Dose 25 MG;  Start 11/17/17 at 21:00


Ceftriaxone Sodium (Rocephin) 50 ml @  100 mls/hr Q24H IVPB  Last administered 

on 11/18/17at 17:36; Admin Dose 100 MLS/HR;  Start 11/17/17 at 18:00


Clonidine (Catapres) 0.1 mg Q4H  PRN PO SBP>170 Last administered on 11/19/17at 

12:14; Admin Dose 0.1 MG;  Start 11/17/17 at 18:00


Tramadol HCl (Ultram) 50 mg Q6H PO  Last administered on 11/19/17at 08:19; 

Admin Dose 50 MG;  Start 11/18/17 at 10:00


Oxycodone HCl (Oxycontin) 10 mg AM PO  Last administered on 11/19/17at 08:18; 

Admin Dose 10 MG;  Start 11/18/17 at 09:00


Polyethylene Glycol (Miralax) 17 gm DAILY PO  Last administered on 11/19/17at 08

:19; Admin Dose 17 GM;  Start 11/18/17 at 09:00


Alprazolam (Xanax) 0.5 mg Q8H  PRN PO ANXIETY Last administered on 11/18/17at 22

:07; Admin Dose 0.5 MG;  Start 11/18/17 at 11:30


Phenol (Cepastat Lozenge) 1 lozenge Q1H  PRN MT COUGH;  Start 11/18/17 at 15:30


Hydralazine HCl (Apresoline) 75 mg Q8 PO ;  Start 11/19/17 at 14:00











IMAN HOANG Nov 19, 2017 14:43

## 2017-11-19 NOTE — CONS
Date/Time of Note


Date/Time of Note


DATE: 11/19/17 


TIME: 13:27





Assessment/Plan


Assessment/Plan


Chief Complaint/Hosp Course


IMPRESSION:


1. Hypertensive urgency/emergency-still uncontrolled


2. Spinal stenosis


3. Diabetes mellitus.


4. Abdominal pain.


5. Arm weakness, left upper extremity.


6. Prerenal ischemia.


7. Mild leukocytosis.


8. Troponin-mildly positive on 3rd draw. No CP. Likely type 2 infarct in 

setting of HTN emergency-with no sig uptrend on serial ecg





Recc:


-Tele


-serial ecg's


-Continue ACEI/BB


-Increase baseline hydralazine to improve BP control


-continue asa


Problems:  





Consultation Date/Type/Reason


Admit Date/Time


Nov 16, 2017 at 10:20


Initial Consult Date


11/16/2017


Type of Consultation:  cardiology


Reason for Consultation


HTN


Referring Provider:  ALIRIO MC





Exam/Review of Systems


Vital Signs


Vitals





 Vital Signs








  Date Time  Temp Pulse Resp B/P Pulse Ox O2 Delivery O2 Flow Rate FiO2


 


11/19/17 13:11    191/82    


 


11/19/17 11:59      Room Air  


 


11/19/17 10:59 98.4 61 17  95   


 


11/19/17 10:16       2.0 














 Intake and Output   


 


 11/18/17 11/18/17 11/19/17





 15:00 23:00 07:00


 


Intake Total  650 ml 500 ml


 


Output Total  1053 ml 


 


Balance  -403 ml 500 ml











Exam


Review of Systems:


CONSTITUTIONAL:  No fevers, chills.


PULMONARY:  No sob


CARDIOVASCULAR: No chest pain/palpitations


GASTROINTESTINAL:  No nausea/vomiting.


GENITOURINARY:  No hematuria/dysuria.


MUSCULOSKELETAL:  No myagias/arthalgias.


PSYCHIATRIC:  The patient denies depression.


NEUROLOGIC:   No weakness


Constitutional:  alert


Psych:  no complaints


Head:  normocephalic


ENMT:  mucosa pink and moist


Respiratory:  clear to auscultation


Cardiovascular:  regular rate and rhythm


Gastrointestinal:  non-tender, soft


Musculoskeletal:  muscle tone (normal)


Extremities:  edema (none)


Neurological:  other (No focal deficits)





Results


Result Diagram:  


11/19/17 0608                                                                  

              11/19/17 0608





Results 24 hrs





Laboratory Tests








Test


  11/18/17


17:34 11/18/17


20:06 11/19/17


06:08 11/19/17


08:04


 


Bedside Glucose 133   136    165  


 


White Blood Count   8.7   


 


Red Blood Count   4.45   


 


Hemoglobin   13.2   


 


Hematocrit   39.9   


 


Mean Corpuscular Volume   89.7   


 


Mean Corpuscular Hemoglobin   29.7   


 


Mean Corpuscular Hemoglobin


Concent 


  


  33.1  


  


 


 


Red Cell Distribution Width   14.3   


 


Platelet Count   262   


 


Mean Platelet Volume   10.3   


 


Neutrophils %   59.4   


 


Lymphocytes %   33.3   


 


Monocytes %   5.9   


 


Eosinophils %   0.8   


 


Basophils %   0.3   


 


Nucleated Red Blood Cells %   0.0   


 


Neutrophils #   5.2   


 


Lymphocytes #   2.9   


 


Monocytes #   0.5   


 


Eosinophils #   0.1   


 


Basophils #   0.0   


 


Nucleated Red Blood Cells #   0.0   


 


Sodium Level   137   


 


Potassium Level   4.3   


 


Chloride Level   101   


 


Carbon Dioxide Level   29   


 


Anion Gap   11   


 


Blood Urea Nitrogen   26  H 


 


Creatinine   1.03  H 


 


Glucose Level   162   


 


Calcium Level   10.9  H 


 


Creatine Kinase   35   


 


Creatine Kinase Index   2.4   


 


Creatinine Kinase MB (Mass)   0.84   


 


Troponin I   0.130  *H 














Test


  11/19/17


11:06 11/19/17


12:13 


  


 


 


Bedside Glucose 192   190    











Medications


Medications





 Current Medications


Hydralazine HCl (Apresoline) 10 mg Q6H  PRN IV SBP>170 Last administered on 11/ 19/17at 13:02; Admin Dose 10 MG;  Start 11/16/17 at 15:30


Insulin Glargine (Lantus) 21 unit DAILY@20 SC  Last administered on 11/18/17at 

20:25; Admin Dose 21 UNIT;  Start 11/16/17 at 20:00


Diagnostic Test (Pha) (Accu-Chek) 1 ea 02 XX ;  Start 11/17/17 at 02:00


Donepezil HCl (Aricept) 10 mg DAILY PO  Last administered on 11/19/17at 08:18; 

Admin Dose 10 MG;  Start 11/17/17 at 09:00


Lisinopril (Zestril) 40 mg DAILY PO  Last administered on 11/19/17at 08:18; 

Admin Dose 40 MG;  Start 11/17/17 at 09:00


Ranitidine HCl (Zantac) 300 mg HS PO  Last administered on 11/18/17at 20:09; 

Admin Dose 300 MG;  Start 11/16/17 at 21:00


Ondansetron HCl (Zofran Inj) 4 mg Q6H  PRN IV NAUSEA AND/OR VOMITING Last 

administered on 11/19/17at 10:19; Admin Dose 4 MG;  Start 11/16/17 at 16:00


Aspirin (Aspirin) 81 mg DAILY PO  Last administered on 11/19/17at 08:19; Admin 

Dose 81 MG;  Start 11/17/17 at 09:00


Acetaminophen (Tylenol Tab) 650 mg Q6H  PRN PO PAIN LEVEL 1-3 OR FEVER;  Start 

11/16/17 at 16:00


Enoxaparin Sodium (Lovenox) 30 mg DAILY SC  Last administered on 11/19/17at 08:

21; Admin Dose 30 MG;  Start 11/17/17 at 09:00


Miscellaneous Information 1 ea NOTE XX ;  Start 11/16/17 at 16:00


Glucose (Glutose) 15 gm Q15M  PRN PO DECREASED GLUCOSE;  Start 11/16/17 at 16:00


Glucose (Glutose) 22.5 gm Q15M  PRN PO DECREASED GLUCOSE;  Start 11/16/17 at 16:

00


Dextrose (D50w Syringe) 25 ml Q15M  PRN IV DECREASED GLUCOSE;  Start 11/16/17 

at 16:00


Dextrose (D50w Syringe) 50 ml Q15M  PRN IV DECREASED GLUCOSE;  Start 11/16/17 

at 16:00


Glucagon (Glucagen) 1 mg Q15M  PRN IM DECREASED GLUCOSE;  Start 11/16/17 at 16:

00


Glucose (Glutose) 15 gm Q15M  PRN BUCCAL DECREASED GLUCOSE;  Start 11/16/17 at 

16:00


Metoprolol Tartrate 25 mg 25 mg BID PO  Last administered on 11/19/17at 08:18; 

Admin Dose 25 MG;  Start 11/17/17 at 21:00


Ceftriaxone Sodium (Rocephin) 50 ml @  100 mls/hr Q24H IVPB  Last administered 

on 11/18/17at 17:36; Admin Dose 100 MLS/HR;  Start 11/17/17 at 18:00


Clonidine (Catapres) 0.1 mg Q4H  PRN PO SBP>170 Last administered on 11/19/17at 

12:14; Admin Dose 0.1 MG;  Start 11/17/17 at 18:00


Tramadol HCl (Ultram) 50 mg Q6H PO  Last administered on 11/19/17at 08:19; 

Admin Dose 50 MG;  Start 11/18/17 at 10:00


Oxycodone HCl (Oxycontin) 10 mg AM PO  Last administered on 11/19/17at 08:18; 

Admin Dose 10 MG;  Start 11/18/17 at 09:00


Polyethylene Glycol (Miralax) 17 gm DAILY PO  Last administered on 11/19/17at 08

:19; Admin Dose 17 GM;  Start 11/18/17 at 09:00


Hydralazine HCl (Apresoline) 50 mg Q8 PO  Last administered on 11/19/17at 13:05

; Admin Dose 50 MG;  Start 11/18/17 at 12:30


Alprazolam (Xanax) 0.5 mg Q8H  PRN PO ANXIETY Last administered on 11/18/17at 22

:07; Admin Dose 0.5 MG;  Start 11/18/17 at 11:30


Phenol (Cepastat Lozenge) 1 lozenge Q1H  PRN MT COUGH;  Start 11/18/17 at 15:30











ZULEIKA DUENAS Nov 19, 2017 13:32

## 2017-11-20 VITALS — SYSTOLIC BLOOD PRESSURE: 196 MMHG | DIASTOLIC BLOOD PRESSURE: 78 MMHG | RESPIRATION RATE: 19 BRPM

## 2017-11-20 VITALS — SYSTOLIC BLOOD PRESSURE: 183 MMHG | RESPIRATION RATE: 20 BRPM | DIASTOLIC BLOOD PRESSURE: 76 MMHG

## 2017-11-20 VITALS — RESPIRATION RATE: 18 BRPM | SYSTOLIC BLOOD PRESSURE: 176 MMHG | DIASTOLIC BLOOD PRESSURE: 70 MMHG

## 2017-11-20 VITALS — DIASTOLIC BLOOD PRESSURE: 66 MMHG | SYSTOLIC BLOOD PRESSURE: 142 MMHG

## 2017-11-20 VITALS — DIASTOLIC BLOOD PRESSURE: 64 MMHG | RESPIRATION RATE: 21 BRPM | SYSTOLIC BLOOD PRESSURE: 145 MMHG

## 2017-11-20 VITALS — SYSTOLIC BLOOD PRESSURE: 145 MMHG | RESPIRATION RATE: 18 BRPM | DIASTOLIC BLOOD PRESSURE: 65 MMHG

## 2017-11-20 VITALS — HEART RATE: 81 BPM

## 2017-11-20 VITALS — HEART RATE: 61 BPM

## 2017-11-20 VITALS — DIASTOLIC BLOOD PRESSURE: 80 MMHG | SYSTOLIC BLOOD PRESSURE: 184 MMHG | RESPIRATION RATE: 18 BRPM

## 2017-11-20 VITALS — SYSTOLIC BLOOD PRESSURE: 168 MMHG | RESPIRATION RATE: 20 BRPM | DIASTOLIC BLOOD PRESSURE: 74 MMHG

## 2017-11-20 VITALS — SYSTOLIC BLOOD PRESSURE: 155 MMHG | DIASTOLIC BLOOD PRESSURE: 65 MMHG

## 2017-11-20 VITALS — HEART RATE: 83 BPM

## 2017-11-20 VITALS — HEART RATE: 69 BPM

## 2017-11-20 VITALS — DIASTOLIC BLOOD PRESSURE: 65 MMHG | SYSTOLIC BLOOD PRESSURE: 161 MMHG

## 2017-11-20 VITALS — HEART RATE: 73 BPM

## 2017-11-20 VITALS — HEART RATE: 75 BPM

## 2017-11-20 RX ADMIN — DEXAMETHASONE SODIUM PHOSPHATE PRN MG: 10 INJECTION, SOLUTION INTRAMUSCULAR; INTRAVENOUS at 11:24

## 2017-11-20 RX ADMIN — RANITIDINE SCH MG: 150 TABLET ORAL at 20:21

## 2017-11-20 RX ADMIN — LISINOPRIL SCH MG: 20 TABLET ORAL at 08:22

## 2017-11-20 RX ADMIN — GABAPENTIN SCH MG: 100 CAPSULE ORAL at 13:13

## 2017-11-20 RX ADMIN — GABAPENTIN SCH MG: 100 CAPSULE ORAL at 20:20

## 2017-11-20 RX ADMIN — POLYETHYLENE GLYCOL 3350 SCH GM: 17 POWDER, FOR SOLUTION ORAL at 08:24

## 2017-11-20 RX ADMIN — GABAPENTIN SCH MG: 100 CAPSULE ORAL at 08:24

## 2017-11-20 RX ADMIN — OXYCODONE HYDROCHLORIDE SCH MG: 10 TABLET, FILM COATED, EXTENDED RELEASE ORAL at 08:25

## 2017-11-20 RX ADMIN — DONEPEZIL HYDROCHLORIDE SCH MG: 10 TABLET ORAL at 08:24

## 2017-11-20 RX ADMIN — ASPIRIN 81 MG SCH MG: 81 TABLET ORAL at 08:22

## 2017-11-20 RX ADMIN — INSULIN GLARGINE SCH UNIT: 100 INJECTION, SOLUTION SUBCUTANEOUS at 20:27

## 2017-11-20 RX ADMIN — METOPROLOL TARTRATE SCH MG: 25 TABLET, FILM COATED ORAL at 20:21

## 2017-11-20 RX ADMIN — AMLODIPINE BESYLATE SCH MG: 5 TABLET ORAL at 20:20

## 2017-11-20 RX ADMIN — ENOXAPARIN SODIUM SCH MG: 100 INJECTION SUBCUTANEOUS at 08:43

## 2017-11-20 RX ADMIN — HYDRALAZINE HYDROCHLORIDE PRN MG: 20 INJECTION INTRAMUSCULAR; INTRAVENOUS at 11:29

## 2017-11-20 RX ADMIN — METOPROLOL TARTRATE SCH MG: 25 TABLET, FILM COATED ORAL at 08:22

## 2017-11-20 NOTE — PN
Date/Time of Note


Date/Time of Note


DATE: 11/20/17 


TIME: 16:20





Assessment/Plan


VTE Prophylaxis


VTE Prophylaxis Intervention:  other





Lines/Catheters


IV Catheter Type (from Nrsg):  Saline Lock


Urinary Cath still in place:  No





Assessment/Plan


Assessment/Plan


-Cervical radiculopathy with central canal stenosis


   -  Dr. Escoto neurology consult is appreciated- staff will contact 

neurology


   - Dr. Pennington is asked to see patient for pain management.


-Hypertensive urgency, continue hydralazine


-Elevated troponin. Dr. Eldridge is following in cardiology consultation.


-Diabetes mellitus, continue Lantus, pre-meal NovoLog and NovoLog per sliding 

scale.


-Obesity with BMI of 35.2- dietary consult 


-History of gastritis, continue PPI





Further recommendations based on clinical course.  Plan of care discussed with 

Dr. Maza











Subjective


24 Hr Interval Summary


Free Text/Dictation


nad, Troponin elevated, denies any chest pain, afebrile, Bp elevated, son at 

bed side- all Qs answered. dw staff


Respiratory:  no complaints


Cardiovascular:  no complaints


Gastrointestinal:  no complaints


Genitourinary:  no complaints


Musculoskeletal:  no complaints





Exam/Review of Systems


Vital Signs


Vitals





 Vital Signs








  Date Time  Temp Pulse Resp B/P Pulse Ox O2 Delivery O2 Flow Rate FiO2


 


11/20/17 15:38 98.8 78 20 168/74 96   


 


11/20/17 13:58       1.0 


 


11/20/17 08:30      Nasal Cannula  














 Intake and Output   


 


 11/19/17 11/19/17 11/20/17





 15:00 23:00 07:00


 


Intake Total  250 ml 


 


Output Total  201 ml 


 


Balance  49 ml 











Exam


Constitutional:  alert, obese


Respiratory:  normal air movement


Cardiovascular:  nl pulses


Gastrointestinal:  non-tender, soft


Musculoskeletal:  nl extremities to inspection


Extremities:  normal pulses


Neurological:  other





Results


Result Diagram:  


11/19/17 0608                                                                  

              11/19/17 0608





Results 24 hrs





Laboratory Tests








Test


  11/19/17


17:29 11/19/17


20:10 11/20/17


08:28 11/20/17


11:28


 


Bedside Glucose 143   167   215   129  











Medications


Medications





 Current Medications


Hydralazine HCl (Apresoline) 10 mg Q6H  PRN IV SBP>170 Last administered on 11/ 20/17at 11:29; Admin Dose 10 MG;  Start 11/16/17 at 15:30


Insulin Glargine (Lantus) 21 unit DAILY@20 SC  Last administered on 11/19/17at 

20:14; Admin Dose 21 UNIT;  Start 11/16/17 at 20:00


Diagnostic Test (Pha) (Accu-Chek) 1 ea 02 XX ;  Start 11/17/17 at 02:00


Donepezil HCl (Aricept) 10 mg DAILY PO  Last administered on 11/20/17at 08:24; 

Admin Dose 10 MG;  Start 11/17/17 at 09:00


Lisinopril (Zestril) 40 mg DAILY PO  Last administered on 11/20/17at 08:22; 

Admin Dose 40 MG;  Start 11/17/17 at 09:00


Ranitidine HCl (Zantac) 300 mg HS PO  Last administered on 11/19/17at 20:17; 

Admin Dose 300 MG;  Start 11/16/17 at 21:00


Ondansetron HCl (Zofran Inj) 4 mg Q6H  PRN IV NAUSEA AND/OR VOMITING Last 

administered on 11/20/17at 11:24; Admin Dose 4 MG;  Start 11/16/17 at 16:00


Aspirin (Aspirin) 81 mg DAILY PO  Last administered on 11/20/17at 08:22; Admin 

Dose 81 MG;  Start 11/17/17 at 09:00


Acetaminophen (Tylenol Tab) 650 mg Q6H  PRN PO PAIN LEVEL 1-3 OR FEVER;  Start 

11/16/17 at 16:00


Enoxaparin Sodium (Lovenox) 30 mg DAILY SC  Last administered on 11/20/17at 08:

43; Admin Dose 30 MG;  Start 11/17/17 at 09:00


Miscellaneous Information 1 ea NOTE XX ;  Start 11/16/17 at 16:00


Glucose (Glutose) 15 gm Q15M  PRN PO DECREASED GLUCOSE;  Start 11/16/17 at 16:00


Glucose (Glutose) 22.5 gm Q15M  PRN PO DECREASED GLUCOSE;  Start 11/16/17 at 16:

00


Dextrose (D50w Syringe) 25 ml Q15M  PRN IV DECREASED GLUCOSE;  Start 11/16/17 

at 16:00


Dextrose (D50w Syringe) 50 ml Q15M  PRN IV DECREASED GLUCOSE;  Start 11/16/17 

at 16:00


Glucagon (Glucagen) 1 mg Q15M  PRN IM DECREASED GLUCOSE;  Start 11/16/17 at 16:

00


Glucose (Glutose) 15 gm Q15M  PRN BUCCAL DECREASED GLUCOSE;  Start 11/16/17 at 

16:00


Metoprolol Tartrate (Lopressor) 25 mg BID PO  Last administered on 11/20/17at 08

:22; Admin Dose 25 MG;  Start 11/17/17 at 21:00


Clonidine (Catapres) 0.1 mg Q4H  PRN PO SBP>170 Last administered on 11/19/17at 

12:14; Admin Dose 0.1 MG;  Start 11/17/17 at 18:00


Tramadol HCl (Ultram) 50 mg Q6H PO  Last administered on 11/20/17at 11:24; 

Admin Dose 50 MG;  Start 11/18/17 at 10:00


Oxycodone HCl (Oxycontin) 10 mg AM PO  Last administered on 11/19/17at 08:18; 

Admin Dose 10 MG;  Start 11/18/17 at 09:00


Polyethylene Glycol (Miralax) 17 gm DAILY PO  Last administered on 11/20/17at 08

:24; Admin Dose 17 GM;  Start 11/18/17 at 09:00


Alprazolam (Xanax) 0.5 mg Q8H  PRN PO ANXIETY Last administered on 11/18/17at 22

:07; Admin Dose 0.5 MG;  Start 11/18/17 at 11:30


Phenol (Cepastat Lozenge) 1 lozenge Q1H  PRN MT COUGH;  Start 11/18/17 at 15:30


Gabapentin (Neurontin) 200 mg TID GTB  Last administered on 11/20/17at 13:13; 

Admin Dose 200 MG;  Start 11/19/17 at 15:00


Hydralazine HCl (Apresoline) 100 mg Q8 PO  Last administered on 11/20/17at 13:15

; Admin Dose 100 MG;  Start 11/20/17 at 14:00


Amlodipine Besylate (Norvasc) 5 mg BID PO ;  Start 11/20/17 at 21:00











IMAN HOANG Nov 20, 2017 16:23

## 2017-11-20 NOTE — CONS
Date/Time of Note


Date/Time of Note


DATE: 11/20/17 


TIME: 11:41





Assessment/Plan


Assessment/Plan


Chief Complaint/Hosp Course


IMPRESSION:


1. Hypertensive urgency/emergency-still uncontrolled


2. Spinal stenosis


3. Diabetes mellitus.


4. Abdominal pain.


5. Arm weakness, left upper extremity.


6. Prerenal ischemia.


7. Mild leukocytosis.


8. Troponin-mildly positive on 3rd draw. No CP. Likely type 2 infarct in 

setting of HTN emergency-with no sig uptrend on serial ecg





Recc:


-Tele


-serial ecg's


-Continue ACEI/BB


-Increase hydralazine further and will start CCB to improve BP control


-continue asa


Problems:  





Consultation Date/Type/Reason


Admit Date/Time


Nov 16, 2017 at 10:20


Initial Consult Date


11/16/2017


Type of Consultation:  cardiology


Reason for Consultation


HTN


Referring Provider:  ALIRIO MC





Exam/Review of Systems


Vital Signs


Vitals





 Vital Signs








  Date Time  Temp Pulse Resp B/P Pulse Ox O2 Delivery O2 Flow Rate FiO2


 


11/20/17 11:29 98.6 57 19 196/78 99   


 


11/19/17 20:00      Nasal Cannula 2.0 














 Intake and Output   


 


 11/19/17 11/19/17 11/20/17





 15:00 23:00 07:00


 


Intake Total  250 ml 


 


Output Total  201 ml 


 


Balance  49 ml 











Exam


Review of Systems:


CONSTITUTIONAL:  No fevers, chills.


PULMONARY:  No sob


CARDIOVASCULAR: No chest pain/palpitations


GASTROINTESTINAL:  No nausea/vomiting.


GENITOURINARY:  No hematuria/dysuria.


MUSCULOSKELETAL:  No myagias/arthalgias.


PSYCHIATRIC:  The patient denies depression.


NEUROLOGIC:   No weakness


Constitutional:  alert, oriented


Psych:  no complaints


Head:  normocephalic


ENMT:  mucosa pink and moist


Neck:  jvd (9 cm water), supple


Respiratory:  diminished breath sounds (at bases/B)


Cardiovascular:  regular rate and rhythm


Gastrointestinal:  other (mild TTP), soft


Musculoskeletal:  muscle tone (normal)


Extremities:  edema (trace/B)


Neurological:  other (No focal deficits)





Results


Result Diagram:  


11/19/17 0608                                                                  

              11/19/17 0608





Results 24 hrs





Laboratory Tests








Test


  11/19/17


12:13 11/19/17


17:29 11/19/17


20:10 11/20/17


08:28


 


Bedside Glucose 190   143   167   215  














Test


  11/20/17


11:28 


  


  


 


 


Bedside Glucose 129     











Medications


Medications





 Current Medications


Hydralazine HCl (Apresoline) 10 mg Q6H  PRN IV SBP>170 Last administered on 11/ 20/17at 11:29; Admin Dose 10 MG;  Start 11/16/17 at 15:30


Insulin Glargine (Lantus) 21 unit DAILY@20 SC  Last administered on 11/19/17at 

20:14; Admin Dose 21 UNIT;  Start 11/16/17 at 20:00


Diagnostic Test (Pha) (Accu-Chek) 1 ea 02 XX ;  Start 11/17/17 at 02:00


Donepezil HCl (Aricept) 10 mg DAILY PO  Last administered on 11/20/17at 08:24; 

Admin Dose 10 MG;  Start 11/17/17 at 09:00


Lisinopril (Zestril) 40 mg DAILY PO  Last administered on 11/20/17at 08:22; 

Admin Dose 40 MG;  Start 11/17/17 at 09:00


Ranitidine HCl (Zantac) 300 mg HS PO  Last administered on 11/19/17at 20:17; 

Admin Dose 300 MG;  Start 11/16/17 at 21:00


Ondansetron HCl (Zofran Inj) 4 mg Q6H  PRN IV NAUSEA AND/OR VOMITING Last 

administered on 11/20/17at 11:24; Admin Dose 4 MG;  Start 11/16/17 at 16:00


Aspirin (Aspirin) 81 mg DAILY PO  Last administered on 11/20/17at 08:22; Admin 

Dose 81 MG;  Start 11/17/17 at 09:00


Acetaminophen (Tylenol Tab) 650 mg Q6H  PRN PO PAIN LEVEL 1-3 OR FEVER;  Start 

11/16/17 at 16:00


Enoxaparin Sodium (Lovenox) 30 mg DAILY SC  Last administered on 11/20/17at 08:

43; Admin Dose 30 MG;  Start 11/17/17 at 09:00


Miscellaneous Information 1 ea NOTE XX ;  Start 11/16/17 at 16:00


Glucose (Glutose) 15 gm Q15M  PRN PO DECREASED GLUCOSE;  Start 11/16/17 at 16:00


Glucose (Glutose) 22.5 gm Q15M  PRN PO DECREASED GLUCOSE;  Start 11/16/17 at 16:

00


Dextrose (D50w Syringe) 25 ml Q15M  PRN IV DECREASED GLUCOSE;  Start 11/16/17 

at 16:00


Dextrose (D50w Syringe) 50 ml Q15M  PRN IV DECREASED GLUCOSE;  Start 11/16/17 

at 16:00


Glucagon (Glucagen) 1 mg Q15M  PRN IM DECREASED GLUCOSE;  Start 11/16/17 at 16:

00


Glucose (Glutose) 15 gm Q15M  PRN BUCCAL DECREASED GLUCOSE;  Start 11/16/17 at 

16:00


Metoprolol Tartrate 25 mg 25 mg BID PO  Last administered on 11/20/17at 08:22; 

Admin Dose 25 MG;  Start 11/17/17 at 21:00


Ceftriaxone Sodium (Rocephin) 50 ml @  100 mls/hr Q24H IVPB  Last administered 

on 11/19/17at 17:32; Admin Dose 100 MLS/HR;  Start 11/17/17 at 18:00


Clonidine (Catapres) 0.1 mg Q4H  PRN PO SBP>170 Last administered on 11/19/17at 

12:14; Admin Dose 0.1 MG;  Start 11/17/17 at 18:00


Tramadol HCl (Ultram) 50 mg Q6H PO  Last administered on 11/20/17at 11:24; 

Admin Dose 50 MG;  Start 11/18/17 at 10:00


Oxycodone HCl (Oxycontin) 10 mg AM PO  Last administered on 11/19/17at 08:18; 

Admin Dose 10 MG;  Start 11/18/17 at 09:00


Polyethylene Glycol (Miralax) 17 gm DAILY PO  Last administered on 11/20/17at 08

:24; Admin Dose 17 GM;  Start 11/18/17 at 09:00


Alprazolam (Xanax) 0.5 mg Q8H  PRN PO ANXIETY Last administered on 11/18/17at 22

:07; Admin Dose 0.5 MG;  Start 11/18/17 at 11:30


Phenol (Cepastat Lozenge) 1 lozenge Q1H  PRN MT COUGH;  Start 11/18/17 at 15:30


Hydralazine HCl (Apresoline) 75 mg Q8 PO  Last administered on 11/20/17at 05:12

; Admin Dose 75 MG;  Start 11/19/17 at 14:00


Gabapentin (Neurontin) 200 mg TID GTB  Last administered on 11/20/17at 08:24; 

Admin Dose 200 MG;  Start 11/19/17 at 15:00











ZULEIKA DUENAS Nov 20, 2017 11:44

## 2017-11-21 VITALS — DIASTOLIC BLOOD PRESSURE: 53 MMHG | RESPIRATION RATE: 19 BRPM | SYSTOLIC BLOOD PRESSURE: 173 MMHG

## 2017-11-21 VITALS — RESPIRATION RATE: 20 BRPM | DIASTOLIC BLOOD PRESSURE: 85 MMHG | SYSTOLIC BLOOD PRESSURE: 192 MMHG

## 2017-11-21 VITALS — SYSTOLIC BLOOD PRESSURE: 195 MMHG | DIASTOLIC BLOOD PRESSURE: 77 MMHG | RESPIRATION RATE: 20 BRPM

## 2017-11-21 VITALS — HEART RATE: 75 BPM

## 2017-11-21 VITALS — SYSTOLIC BLOOD PRESSURE: 164 MMHG | DIASTOLIC BLOOD PRESSURE: 72 MMHG | HEART RATE: 75 BPM | RESPIRATION RATE: 19 BRPM

## 2017-11-21 VITALS — RESPIRATION RATE: 18 BRPM | DIASTOLIC BLOOD PRESSURE: 74 MMHG | SYSTOLIC BLOOD PRESSURE: 174 MMHG

## 2017-11-21 VITALS — HEART RATE: 80 BPM

## 2017-11-21 VITALS — SYSTOLIC BLOOD PRESSURE: 201 MMHG | DIASTOLIC BLOOD PRESSURE: 82 MMHG

## 2017-11-21 VITALS — HEART RATE: 98 BPM

## 2017-11-21 VITALS — RESPIRATION RATE: 18 BRPM | SYSTOLIC BLOOD PRESSURE: 156 MMHG | DIASTOLIC BLOOD PRESSURE: 68 MMHG

## 2017-11-21 VITALS — HEART RATE: 64 BPM

## 2017-11-21 VITALS — HEART RATE: 71 BPM

## 2017-11-21 LAB
ANION GAP SERPL CALC-SCNC: 13 MMOL/L (ref 8–16)
BASOPHILS # BLD AUTO: 0 10^3/UL (ref 0–0.1)
BASOPHILS NFR BLD: 0.4 % (ref 0–2)
BUN SERPL-MCNC: 21 MG/DL (ref 7–20)
CALCIUM SERPL-MCNC: 10.4 MG/DL (ref 8.4–10.2)
CHLORIDE SERPL-SCNC: 99 MMOL/L (ref 97–110)
CO2 SERPL-SCNC: 26 MMOL/L (ref 21–31)
CREAT SERPL-MCNC: 0.95 MG/DL (ref 0.44–1)
EOSINOPHIL # BLD: 0.2 10^3/UL (ref 0–0.5)
EOSINOPHIL NFR BLD: 1.5 % (ref 0–7)
ERYTHROCYTE [DISTWIDTH] IN BLOOD BY AUTOMATED COUNT: 14.6 % (ref 11.5–14.5)
GLUCOSE SERPL-MCNC: 156 MG/DL (ref 70–220)
HCT VFR BLD CALC: 41.1 % (ref 37–47)
HGB BLD-MCNC: 13.8 G/DL (ref 12–16)
LYMPHOCYTES # BLD AUTO: 3.3 10^3/UL (ref 0.8–2.9)
LYMPHOCYTES NFR BLD AUTO: 32.8 % (ref 15–51)
MCH RBC QN AUTO: 30.1 PG (ref 29–33)
MCHC RBC AUTO-ENTMCNC: 33.6 G/DL (ref 32–37)
MCV RBC AUTO: 89.5 FL (ref 82–101)
MONOCYTES # BLD: 0.7 10^3/UL (ref 0.3–0.9)
MONOCYTES NFR BLD: 6.9 % (ref 0–11)
NEUTROPHILS # BLD: 5.8 10^3/UL (ref 1.6–7.5)
NEUTROPHILS NFR BLD AUTO: 58 % (ref 39–77)
NRBC # BLD MANUAL: 0 10^3/UL (ref 0–0)
NRBC BLD AUTO-RTO: 0 /100WBC (ref 0–0)
PLATELET # BLD: 292 10^3/UL (ref 140–415)
PMV BLD AUTO: 10 FL (ref 7.4–10.4)
POTASSIUM SERPL-SCNC: 3.9 MMOL/L (ref 3.5–5.1)
RBC # BLD AUTO: 4.59 10^6/UL (ref 4.2–5.4)
SODIUM SERPL-SCNC: 134 MMOL/L (ref 135–144)
WBC # BLD AUTO: 9.9 10^3/UL (ref 4.8–10.8)

## 2017-11-21 RX ADMIN — ENOXAPARIN SODIUM SCH MG: 100 INJECTION SUBCUTANEOUS at 08:43

## 2017-11-21 RX ADMIN — RANITIDINE SCH MG: 150 TABLET ORAL at 20:08

## 2017-11-21 RX ADMIN — HYDRALAZINE HYDROCHLORIDE PRN MG: 20 INJECTION INTRAMUSCULAR; INTRAVENOUS at 11:35

## 2017-11-21 RX ADMIN — ASPIRIN 81 MG SCH MG: 81 TABLET ORAL at 08:33

## 2017-11-21 RX ADMIN — LISINOPRIL SCH MG: 20 TABLET ORAL at 08:32

## 2017-11-21 RX ADMIN — POLYETHYLENE GLYCOL 3350 SCH GM: 17 POWDER, FOR SOLUTION ORAL at 08:33

## 2017-11-21 RX ADMIN — AMLODIPINE BESYLATE SCH MG: 5 TABLET ORAL at 08:33

## 2017-11-21 RX ADMIN — GABAPENTIN SCH MG: 100 CAPSULE ORAL at 12:44

## 2017-11-21 RX ADMIN — NIFEDIPINE SCH MG: 60 TABLET, FILM COATED, EXTENDED RELEASE ORAL at 20:09

## 2017-11-21 RX ADMIN — DONEPEZIL HYDROCHLORIDE SCH MG: 10 TABLET ORAL at 08:33

## 2017-11-21 RX ADMIN — ALPRAZOLAM PRN MG: 0.5 TABLET ORAL at 20:15

## 2017-11-21 RX ADMIN — OXYCODONE HYDROCHLORIDE SCH MG: 10 TABLET, FILM COATED, EXTENDED RELEASE ORAL at 08:32

## 2017-11-21 RX ADMIN — METOPROLOL TARTRATE SCH MG: 50 TABLET, FILM COATED ORAL at 20:08

## 2017-11-21 RX ADMIN — ZOLPIDEM TARTRATE PRN MG: 5 TABLET, FILM COATED ORAL at 01:32

## 2017-11-21 RX ADMIN — GABAPENTIN SCH MG: 100 CAPSULE ORAL at 08:31

## 2017-11-21 RX ADMIN — DEXAMETHASONE SODIUM PHOSPHATE PRN MG: 10 INJECTION, SOLUTION INTRAMUSCULAR; INTRAVENOUS at 11:34

## 2017-11-21 RX ADMIN — GABAPENTIN SCH MG: 100 CAPSULE ORAL at 20:09

## 2017-11-21 RX ADMIN — INSULIN GLARGINE SCH UNIT: 100 INJECTION, SOLUTION SUBCUTANEOUS at 20:39

## 2017-11-21 RX ADMIN — METOPROLOL TARTRATE SCH MG: 25 TABLET, FILM COATED ORAL at 08:32

## 2017-11-21 NOTE — CONS
Date/Time of Note


Date/Time of Note


DATE: 11/21/17 


TIME: 10:29





Assessment/Plan


Assessment/Plan


Additional Assessment/Plan


1. Hypertensive urgency/emergency-still uncontrolled - still on high side, add 

BP Rx now 


2. Spinal stenosis - per ortho 


3. Diabetes mellitus- ont o keep euglycemic now. 


4. Abdominal pain - better. 


5. Arm weakness, left upper extremity.


6. Prerenal ischemia- renal team follows, avoid nephrotoxic meds. 


7. Mild leukocytosis.


8. Troponin-mildly positive on 3rd draw. No CP. Likely type 2 infarct in 

setting of HTN emergency-with no sig uptrend on serial ecg





Consultation Date/Type/Reason


Admit Date/Time


Nov 16, 2017 at 10:20


Initial Consult Date


11/17/17


Type of Consultation:  cardiology


Referring Provider:  ALIRIO MC





24 HR Interval Summary


Free Text/Dictation


NO acute  events - BP still high - con't to adjust Rx as tolerated. 





ROS: No fever, no chills, no nausea, no vomiting, no diarrhea/constipation


        No recent weight changes


        No chest pain, no PND, no orthopnea


        No dizziness, blurred vision


        No thirst, no heat or cold intolerance





Exam/Review of Systems


Vital Signs


Vitals





 Vital Signs








  Date Time  Temp Pulse Resp B/P Pulse Ox O2 Delivery O2 Flow Rate FiO2


 


11/21/17 08:14  98      


 


11/21/17 07:39 98.6  19 173/53 96   


 


11/20/17 20:00      Nasal Cannula 2.0 














 Intake and Output   


 


 11/20/17 11/20/17 11/21/17





 14:59 22:59 06:59


 


Intake Total  700 ml 120 ml


 


Balance  700 ml 120 ml











Exam


General: WN/WD/NAD, AOx 3 [place] 


HEENT: Unicetric/atraumatic/EOMI (follows commands)


NECK: JVD elevated, no thyromegaly


Lymph: no lymphadenopathy


HEART: regular with no S3, II/VI systolic murmur at apex


LUNGS: Coarse sounds


ABD: soft, NT, ND, +BS


: Intact


Neuro: non focal


SKIN: chronic changes


EXT: trace edema





Results


Result Diagram:  


11/21/17 0645                                                                  

              11/21/17 0645





Results 24 hrs





Laboratory Tests








Test


  11/20/17


11:28 11/20/17


18:18 11/20/17


20:23 11/21/17


06:45


 


Bedside Glucose 129   166   140   


 


White Blood Count    9.9  


 


Red Blood Count    4.59  


 


Hemoglobin    13.8  


 


Hematocrit    41.1  


 


Mean Corpuscular Volume    89.5  


 


Mean Corpuscular Hemoglobin    30.1  


 


Mean Corpuscular Hemoglobin


Concent 


  


  


  33.6  


 


 


Red Cell Distribution Width    14.6  H


 


Platelet Count    292  


 


Mean Platelet Volume    10.0  


 


Neutrophils %    58.0  


 


Lymphocytes %    32.8  


 


Monocytes %    6.9  


 


Eosinophils %    1.5  


 


Basophils %    0.4  


 


Nucleated Red Blood Cells %    0.0  


 


Neutrophils #    5.8  


 


Lymphocytes #    3.3  H


 


Monocytes #    0.7  


 


Eosinophils #    0.2  


 


Basophils #    0.0  


 


Nucleated Red Blood Cells #    0.0  


 


Sodium Level    134  L


 


Potassium Level    3.9  


 


Chloride Level    99  


 


Carbon Dioxide Level    26  


 


Anion Gap    13  


 


Blood Urea Nitrogen    21  H


 


Creatinine    0.95  


 


Glucose Level    156  


 


Calcium Level    10.4  H














Test


  11/21/17


08:27 


  


  


 


 


Bedside Glucose 204     











Medications


Medications





 Current Medications


Hydralazine HCl (Apresoline) 10 mg Q6H  PRN IV SBP>170 Last administered on 11/ 20/17at 11:29; Admin Dose 10 MG;  Start 11/16/17 at 15:30


Insulin Glargine (Lantus) 21 unit DAILY@20 SC  Last administered on 11/20/17at 

20:27; Admin Dose 21 UNIT;  Start 11/16/17 at 20:00


Diagnostic Test (Pha) (Accu-Chek) 1 ea 02 XX ;  Start 11/17/17 at 02:00


Donepezil HCl (Aricept) 10 mg DAILY PO  Last administered on 11/21/17at 08:33; 

Admin Dose 10 MG;  Start 11/17/17 at 09:00


Lisinopril (Zestril) 40 mg DAILY PO  Last administered on 11/21/17at 08:32; 

Admin Dose 40 MG;  Start 11/17/17 at 09:00


Ranitidine HCl (Zantac) 300 mg HS PO  Last administered on 11/20/17at 20:21; 

Admin Dose 300 MG;  Start 11/16/17 at 21:00


Ondansetron HCl (Zofran Inj) 4 mg Q6H  PRN IV NAUSEA AND/OR VOMITING Last 

administered on 11/20/17at 11:24; Admin Dose 4 MG;  Start 11/16/17 at 16:00


Aspirin (Aspirin) 81 mg DAILY PO  Last administered on 11/21/17at 08:33; Admin 

Dose 81 MG;  Start 11/17/17 at 09:00


Acetaminophen (Tylenol Tab) 650 mg Q6H  PRN PO PAIN LEVEL 1-3 OR FEVER;  Start 

11/16/17 at 16:00


Enoxaparin Sodium (Lovenox) 30 mg DAILY SC  Last administered on 11/21/17at 08:

43; Admin Dose 30 MG;  Start 11/17/17 at 09:00


Miscellaneous Information 1 ea NOTE XX ;  Start 11/16/17 at 16:00


Glucose (Glutose) 15 gm Q15M  PRN PO DECREASED GLUCOSE;  Start 11/16/17 at 16:00


Glucose (Glutose) 22.5 gm Q15M  PRN PO DECREASED GLUCOSE;  Start 11/16/17 at 16:

00


Dextrose (D50w Syringe) 25 ml Q15M  PRN IV DECREASED GLUCOSE;  Start 11/16/17 

at 16:00


Dextrose (D50w Syringe) 50 ml Q15M  PRN IV DECREASED GLUCOSE;  Start 11/16/17 

at 16:00


Glucagon (Glucagen) 1 mg Q15M  PRN IM DECREASED GLUCOSE;  Start 11/16/17 at 16:

00


Glucose (Glutose) 15 gm Q15M  PRN BUCCAL DECREASED GLUCOSE;  Start 11/16/17 at 

16:00


Metoprolol Tartrate (Lopressor) 25 mg BID PO  Last administered on 11/21/17at 08

:32; Admin Dose 25 MG;  Start 11/17/17 at 21:00


Clonidine (Catapres) 0.1 mg Q4H  PRN PO SBP>170 Last administered on 11/19/17at 

12:14; Admin Dose 0.1 MG;  Start 11/17/17 at 18:00


Tramadol HCl (Ultram) 50 mg Q6H PO  Last administered on 11/20/17at 22:04; 

Admin Dose 50 MG;  Start 11/18/17 at 10:00


Oxycodone HCl (Oxycontin) 10 mg AM PO  Last administered on 11/21/17at 08:32; 

Admin Dose 10 MG;  Start 11/18/17 at 09:00


Polyethylene Glycol (Miralax) 17 gm DAILY PO  Last administered on 11/21/17at 08

:33; Admin Dose 17 GM;  Start 11/18/17 at 09:00


Alprazolam (Xanax) 0.5 mg Q8H  PRN PO ANXIETY Last administered on 11/18/17at 22

:07; Admin Dose 0.5 MG;  Start 11/18/17 at 11:30


Phenol (Cepastat Lozenge) 1 lozenge Q1H  PRN MT COUGH;  Start 11/18/17 at 15:30


Gabapentin (Neurontin) 200 mg TID GTB  Last administered on 11/21/17at 08:31; 

Admin Dose 200 MG;  Start 11/19/17 at 15:00


Hydralazine HCl (Apresoline) 100 mg Q8 PO  Last administered on 11/21/17at 05:35

; Admin Dose 100 MG;  Start 11/20/17 at 14:00


Amlodipine Besylate (Norvasc) 5 mg BID PO  Last administered on 11/21/17at 08:33

; Admin Dose 5 MG;  Start 11/20/17 at 21:00


Zolpidem Tartrate (Ambien) 5 mg HS  PRN PO INSOMNIA Last administered on 11/21/ 17at 01:32; Admin Dose 5 MG;  Start 11/21/17 at 01:00











HEYDI JUDD MD Nov 21, 2017 10:32

## 2017-11-21 NOTE — RADRPT
Vent Rate: 79 bpm

RR Interval: 0 msec

VA Interval: 152 msec

QRS Duration: 82 msec

QT Interval: 386 msec

QTC Interval: 442 msec

P-R-T Axis: 59 - -22 - 20 degrees

 

 Normal sinus rhythm

 Minimal voltage criteria for LVH, may be normal variant

 Borderline ECG

 

Electronically Signed By: Oumar Jensen

[id number]

## 2017-11-21 NOTE — PN
Date/Time of Note


Date/Time of Note


DATE: 11/21/17 


TIME: 17:20





Assessment/Plan


VTE Prophylaxis


VTE Prophylaxis Intervention:  SCD's





Lines/Catheters


IV Catheter Type (from Nrs):  Saline Lock


Central line still needed:  Yes


Urinary Cath still in place:  No





Assessment/Plan


Chief Complaint/Hosp Course


Patient denies to have elevated blood pressure, BP medication is being 

optimized by cardiology, continue telemetry monitoring.


Assessment/Plan


-Cervical radiculopathy with central canal stenosis, Dr. Escoto neurology 

consult is appreciated.  Dr. Pennington is following patient for pain management.


-Hypertensive urgency, continue hydralazine


-Elevated troponin. Dr. Eldridge is following in cardiology consultation.


-Diabetes mellitus, continue Lantus, pre-meal NovoLog and NovoLog per sliding 

scale.


-Obesity with BMI of 36.2


-History of gastritis, continue PPI





Further recommendations based on clinical course.  Plan of care discussed with 

Dr. Maza


Problems:  





Exam/Review of Systems


Vital Signs


Vitals





 Vital Signs








  Date Time  Temp Pulse Resp B/P Pulse Ox O2 Delivery O2 Flow Rate FiO2


 


11/21/17 16:06 97.6 79 19 164/72 96   


 


11/21/17 08:30      Nasal Cannula 2.0 














 Intake and Output   


 


 11/20/17 11/20/17 11/21/17





 15:00 23:00 07:00


 


Intake Total  700 ml 120 ml


 


Balance  700 ml 120 ml











Exam


Constitutional:  alert, oriented


Neck:  supple


Respiratory:  normal air movement


Cardiovascular:  nl pulses


Gastrointestinal:  non-tender, soft


Musculoskeletal:  nl extremities to inspection


Neurological:  other (Left upper extremity weakness)


Skin:  nl turgor





Results


Result Diagram:  


11/21/17 0645                                                                  

              11/21/17 0645





Results 24 hrs





Laboratory Tests








Test


  11/20/17


18:18 11/20/17


20:23 11/21/17


06:45 11/21/17


08:27


 


Bedside Glucose 166   140    204  


 


White Blood Count   9.9   


 


Red Blood Count   4.59   


 


Hemoglobin   13.8   


 


Hematocrit   41.1   


 


Mean Corpuscular Volume   89.5   


 


Mean Corpuscular Hemoglobin   30.1   


 


Mean Corpuscular Hemoglobin


Concent 


  


  33.6  


  


 


 


Red Cell Distribution Width   14.6  H 


 


Platelet Count   292   


 


Mean Platelet Volume   10.0   


 


Neutrophils %   58.0   


 


Lymphocytes %   32.8   


 


Monocytes %   6.9   


 


Eosinophils %   1.5   


 


Basophils %   0.4   


 


Nucleated Red Blood Cells %   0.0   


 


Neutrophils #   5.8   


 


Lymphocytes #   3.3  H 


 


Monocytes #   0.7   


 


Eosinophils #   0.2   


 


Basophils #   0.0   


 


Nucleated Red Blood Cells #   0.0   


 


Sodium Level   134  L 


 


Potassium Level   3.9   


 


Chloride Level   99   


 


Carbon Dioxide Level   26   


 


Anion Gap   13   


 


Blood Urea Nitrogen   21  H 


 


Creatinine   0.95   


 


Glucose Level   156   


 


Calcium Level   10.4  H 














Test


  11/21/17


11:41 11/21/17


17:10 


  


 


 


Bedside Glucose 271  H 150    











Medications


Medications





 Current Medications


Hydralazine HCl (Apresoline) 10 mg Q6H  PRN IV SBP>170 Last administered on 11/ 21/17at 11:35; Admin Dose 10 MG;  Start 11/16/17 at 15:30


Insulin Glargine (Lantus) 21 unit DAILY@20 SC  Last administered on 11/20/17at 

20:27; Admin Dose 21 UNIT;  Start 11/16/17 at 20:00


Diagnostic Test (Pha) (Accu-Chek) 1 ea 02 XX ;  Start 11/17/17 at 02:00


Donepezil HCl (Aricept) 10 mg DAILY PO  Last administered on 11/21/17at 08:33; 

Admin Dose 10 MG;  Start 11/17/17 at 09:00


Lisinopril (Zestril) 40 mg DAILY PO  Last administered on 11/21/17at 08:32; 

Admin Dose 40 MG;  Start 11/17/17 at 09:00


Ranitidine HCl (Zantac) 300 mg HS PO  Last administered on 11/20/17at 20:21; 

Admin Dose 300 MG;  Start 11/16/17 at 21:00


Ondansetron HCl (Zofran Inj) 4 mg Q6H  PRN IV NAUSEA AND/OR VOMITING Last 

administered on 11/21/17at 11:34; Admin Dose 4 MG;  Start 11/16/17 at 16:00


Aspirin (Aspirin) 81 mg DAILY PO  Last administered on 11/21/17at 08:33; Admin 

Dose 81 MG;  Start 11/17/17 at 09:00


Acetaminophen (Tylenol Tab) 650 mg Q6H  PRN PO PAIN LEVEL 1-3 OR FEVER;  Start 

11/16/17 at 16:00


Enoxaparin Sodium (Lovenox) 30 mg DAILY SC  Last administered on 11/21/17at 08:

43; Admin Dose 30 MG;  Start 11/17/17 at 09:00


Miscellaneous Information 1 ea NOTE XX ;  Start 11/16/17 at 16:00


Glucose (Glutose) 15 gm Q15M  PRN PO DECREASED GLUCOSE;  Start 11/16/17 at 16:00


Glucose (Glutose) 22.5 gm Q15M  PRN PO DECREASED GLUCOSE;  Start 11/16/17 at 16:

00


Dextrose (D50w Syringe) 25 ml Q15M  PRN IV DECREASED GLUCOSE;  Start 11/16/17 

at 16:00


Dextrose (D50w Syringe) 50 ml Q15M  PRN IV DECREASED GLUCOSE;  Start 11/16/17 

at 16:00


Glucagon (Glucagen) 1 mg Q15M  PRN IM DECREASED GLUCOSE;  Start 11/16/17 at 16:

00


Glucose (Glutose) 15 gm Q15M  PRN BUCCAL DECREASED GLUCOSE;  Start 11/16/17 at 

16:00


Clonidine (Catapres) 0.1 mg Q4H  PRN PO SBP>170 Last administered on 11/21/17at 

14:54; Admin Dose 0.1 MG;  Start 11/17/17 at 18:00


Oxycodone HCl (Oxycontin) 10 mg AM PO  Last administered on 11/21/17at 08:32; 

Admin Dose 10 MG;  Start 11/18/17 at 09:00


Polyethylene Glycol (Miralax) 17 gm DAILY PO  Last administered on 11/21/17at 08

:33; Admin Dose 17 GM;  Start 11/18/17 at 09:00


Alprazolam (Xanax) 0.5 mg Q8H  PRN PO ANXIETY Last administered on 11/18/17at 22

:07; Admin Dose 0.5 MG;  Start 11/18/17 at 11:30


Phenol (Cepastat Lozenge) 1 lozenge Q1H  PRN MT COUGH;  Start 11/18/17 at 15:30


Gabapentin (Neurontin) 200 mg TID GTB  Last administered on 11/21/17at 12:44; 

Admin Dose 200 MG;  Start 11/19/17 at 15:00


Hydralazine HCl (Apresoline) 100 mg Q8 PO  Last administered on 11/21/17at 14:53

; Admin Dose 100 MG;  Start 11/20/17 at 14:00


Zolpidem Tartrate (Ambien) 5 mg HS  PRN PO INSOMNIA Last administered on 11/21/ 17at 01:32; Admin Dose 5 MG;  Start 11/21/17 at 01:00


Nifedipine (Procardia Xl) 60 mg BID PO ;  Start 11/21/17 at 21:00


Metoprolol Tartrate (Lopressor) 50 mg BID PO ;  Start 11/21/17 at 21:00











ALIRIO MC Nov 21, 2017 17:21

## 2017-11-22 VITALS — HEART RATE: 66 BPM | SYSTOLIC BLOOD PRESSURE: 183 MMHG | DIASTOLIC BLOOD PRESSURE: 76 MMHG

## 2017-11-22 VITALS — RESPIRATION RATE: 20 BRPM | SYSTOLIC BLOOD PRESSURE: 152 MMHG | DIASTOLIC BLOOD PRESSURE: 68 MMHG

## 2017-11-22 VITALS — SYSTOLIC BLOOD PRESSURE: 164 MMHG | RESPIRATION RATE: 20 BRPM | DIASTOLIC BLOOD PRESSURE: 69 MMHG

## 2017-11-22 VITALS — SYSTOLIC BLOOD PRESSURE: 164 MMHG | DIASTOLIC BLOOD PRESSURE: 74 MMHG | RESPIRATION RATE: 20 BRPM

## 2017-11-22 VITALS — DIASTOLIC BLOOD PRESSURE: 71 MMHG | HEART RATE: 65 BPM | SYSTOLIC BLOOD PRESSURE: 160 MMHG

## 2017-11-22 VITALS — SYSTOLIC BLOOD PRESSURE: 180 MMHG | DIASTOLIC BLOOD PRESSURE: 78 MMHG | RESPIRATION RATE: 18 BRPM

## 2017-11-22 VITALS — HEART RATE: 75 BPM

## 2017-11-22 VITALS — DIASTOLIC BLOOD PRESSURE: 66 MMHG | SYSTOLIC BLOOD PRESSURE: 160 MMHG | HEART RATE: 72 BPM

## 2017-11-22 VITALS — HEART RATE: 66 BPM

## 2017-11-22 VITALS — HEART RATE: 69 BPM

## 2017-11-22 VITALS — SYSTOLIC BLOOD PRESSURE: 178 MMHG | HEART RATE: 66 BPM | DIASTOLIC BLOOD PRESSURE: 72 MMHG

## 2017-11-22 VITALS — SYSTOLIC BLOOD PRESSURE: 163 MMHG | DIASTOLIC BLOOD PRESSURE: 71 MMHG | RESPIRATION RATE: 20 BRPM

## 2017-11-22 VITALS — RESPIRATION RATE: 20 BRPM | SYSTOLIC BLOOD PRESSURE: 124 MMHG | DIASTOLIC BLOOD PRESSURE: 63 MMHG

## 2017-11-22 VITALS — DIASTOLIC BLOOD PRESSURE: 73 MMHG | RESPIRATION RATE: 18 BRPM | SYSTOLIC BLOOD PRESSURE: 139 MMHG

## 2017-11-22 VITALS — HEART RATE: 70 BPM

## 2017-11-22 VITALS — HEART RATE: 85 BPM

## 2017-11-22 VITALS — HEART RATE: 62 BPM

## 2017-11-22 LAB
ANION GAP SERPL CALC-SCNC: 10 MMOL/L (ref 8–16)
BASOPHILS # BLD AUTO: 0 10^3/UL (ref 0–0.1)
BASOPHILS NFR BLD: 0.4 % (ref 0–2)
BUN SERPL-MCNC: 19 MG/DL (ref 7–20)
CALCIUM SERPL-MCNC: 10.7 MG/DL (ref 8.4–10.2)
CHLORIDE SERPL-SCNC: 100 MMOL/L (ref 97–110)
CO2 SERPL-SCNC: 30 MMOL/L (ref 21–31)
CREAT SERPL-MCNC: 0.92 MG/DL (ref 0.44–1)
EOSINOPHIL # BLD: 0.1 10^3/UL (ref 0–0.5)
EOSINOPHIL NFR BLD: 1.1 % (ref 0–7)
ERYTHROCYTE [DISTWIDTH] IN BLOOD BY AUTOMATED COUNT: 14.5 % (ref 11.5–14.5)
GLUCOSE SERPL-MCNC: 164 MG/DL (ref 70–220)
HCT VFR BLD CALC: 43 % (ref 37–47)
HGB BLD-MCNC: 14.3 G/DL (ref 12–16)
LYMPHOCYTES # BLD AUTO: 2.9 10^3/UL (ref 0.8–2.9)
LYMPHOCYTES NFR BLD AUTO: 27.7 % (ref 15–51)
MCH RBC QN AUTO: 29.7 PG (ref 29–33)
MCHC RBC AUTO-ENTMCNC: 33.3 G/DL (ref 32–37)
MCV RBC AUTO: 89.2 FL (ref 82–101)
MONOCYTES # BLD: 0.6 10^3/UL (ref 0.3–0.9)
MONOCYTES NFR BLD: 5.6 % (ref 0–11)
NEUTROPHILS # BLD: 6.8 10^3/UL (ref 1.6–7.5)
NEUTROPHILS NFR BLD AUTO: 64.9 % (ref 39–77)
NRBC # BLD MANUAL: 0 10^3/UL (ref 0–0)
NRBC BLD AUTO-RTO: 0 /100WBC (ref 0–0)
PLATELET # BLD: 288 10^3/UL (ref 140–415)
PMV BLD AUTO: 9.7 FL (ref 7.4–10.4)
POTASSIUM SERPL-SCNC: 4.1 MMOL/L (ref 3.5–5.1)
RBC # BLD AUTO: 4.82 10^6/UL (ref 4.2–5.4)
SODIUM SERPL-SCNC: 136 MMOL/L (ref 135–144)
WBC # BLD AUTO: 10.4 10^3/UL (ref 4.8–10.8)

## 2017-11-22 RX ADMIN — GABAPENTIN SCH MG: 100 CAPSULE ORAL at 13:48

## 2017-11-22 RX ADMIN — GABAPENTIN SCH MG: 100 CAPSULE ORAL at 10:17

## 2017-11-22 RX ADMIN — INSULIN GLARGINE SCH UNIT: 100 INJECTION, SOLUTION SUBCUTANEOUS at 21:11

## 2017-11-22 RX ADMIN — DONEPEZIL HYDROCHLORIDE SCH MG: 10 TABLET ORAL at 10:18

## 2017-11-22 RX ADMIN — ASPIRIN 81 MG SCH MG: 81 TABLET ORAL at 10:18

## 2017-11-22 RX ADMIN — HYDRALAZINE HYDROCHLORIDE PRN MG: 20 INJECTION INTRAMUSCULAR; INTRAVENOUS at 17:48

## 2017-11-22 RX ADMIN — METOPROLOL TARTRATE SCH MG: 50 TABLET, FILM COATED ORAL at 20:55

## 2017-11-22 RX ADMIN — NIFEDIPINE SCH MG: 60 TABLET, FILM COATED, EXTENDED RELEASE ORAL at 20:55

## 2017-11-22 RX ADMIN — ENOXAPARIN SODIUM SCH MG: 100 INJECTION SUBCUTANEOUS at 10:24

## 2017-11-22 RX ADMIN — POLYETHYLENE GLYCOL 3350 SCH GM: 17 POWDER, FOR SOLUTION ORAL at 10:19

## 2017-11-22 RX ADMIN — OXYCODONE HYDROCHLORIDE SCH MG: 10 TABLET, FILM COATED, EXTENDED RELEASE ORAL at 09:00

## 2017-11-22 RX ADMIN — LISINOPRIL SCH MG: 20 TABLET ORAL at 10:16

## 2017-11-22 RX ADMIN — DOXAZOSIN MESYLATE SCH MG: 2 TABLET ORAL at 20:55

## 2017-11-22 RX ADMIN — GABAPENTIN SCH MG: 100 CAPSULE ORAL at 20:55

## 2017-11-22 RX ADMIN — RANITIDINE SCH MG: 150 TABLET ORAL at 20:54

## 2017-11-22 RX ADMIN — DEXAMETHASONE SODIUM PHOSPHATE PRN MG: 10 INJECTION, SOLUTION INTRAMUSCULAR; INTRAVENOUS at 13:57

## 2017-11-22 RX ADMIN — NIFEDIPINE SCH MG: 60 TABLET, FILM COATED, EXTENDED RELEASE ORAL at 10:17

## 2017-11-22 RX ADMIN — METOPROLOL TARTRATE SCH MG: 50 TABLET, FILM COATED ORAL at 10:18

## 2017-11-22 RX ADMIN — DEXAMETHASONE SODIUM PHOSPHATE PRN MG: 10 INJECTION, SOLUTION INTRAMUSCULAR; INTRAVENOUS at 20:54

## 2017-11-22 NOTE — PN
Date/Time of Note


Date/Time of Note


DATE: 11/22/17 


TIME: 16:38





Assessment/Plan


VTE Prophylaxis


VTE Prophylaxis Intervention:  SCD's





Lines/Catheters


IV Catheter Type (from [facility]):  Saline Lock


Urinary Cath still in place:  No





Assessment/Plan


Chief Complaint/Hosp Course


Patient continues to have elevated blood pressure, continue medical 

optimization by cardiology, continue telemetry monitoring.  When blood pressure 

is well controlled patient could be transferred to acute rehab.


Assessment/Plan


-Cervical radiculopathy with central canal stenosis, Dr. Escoto neurology 

consult is appreciated.  Dr. Pennington is following patient for pain management.


-Hypertensive urgency, continue hydralazine


-Elevated troponin. Dr. Eldridge is following in cardiology consultation.


-Diabetes mellitus, continue Lantus, pre-meal NovoLog and NovoLog per sliding 

scale.


-Obesity with BMI of 36.2


-History of gastritis, continue PPI





Further recommendations based on clinical course.  Plan of care discussed with 

Dr. Maza


Problems:  





Exam/Review of Systems


Vital Signs


Vitals





 Vital Signs








  Date Time  Temp Pulse Resp B/P Pulse Ox O2 Delivery O2 Flow Rate FiO2


 


11/22/17 15:55 98.1 67 20 164/74 97   


 


11/22/17 08:00      Nasal Cannula 2.0 














 Intake and Output   


 


 11/21/17 11/21/17 11/22/17





 14:59 22:59 06:59


 


Intake Total  700 ml 350 ml


 


Balance  700 ml 350 ml











Exam


Constitutional:  alert, oriented


Neck:  supple


Respiratory:  normal air movement


Cardiovascular:  nl pulses


Gastrointestinal:  non-tender, soft


Musculoskeletal:  nl extremities to inspection


Neurological:  other (Left upper extremity weakness)





Results


Result Diagram:  


11/22/17 0647                                                                  

              11/22/17 0647





Results 24 hrs





Laboratory Tests








Test


  11/21/17


17:10 11/21/17


20:05 11/22/17


06:47 11/22/17


08:32


 


Bedside Glucose 150   153    221  H


 


White Blood Count   10.4   


 


Red Blood Count   4.82   


 


Hemoglobin   14.3   


 


Hematocrit   43.0   


 


Mean Corpuscular Volume   89.2   


 


Mean Corpuscular Hemoglobin   29.7   


 


Mean Corpuscular Hemoglobin


Concent 


  


  33.3  


  


 


 


Red Cell Distribution Width   14.5   


 


Platelet Count   288   


 


Mean Platelet Volume   9.7   


 


Neutrophils %   64.9   


 


Lymphocytes %   27.7   


 


Monocytes %   5.6   


 


Eosinophils %   1.1   


 


Basophils %   0.4   


 


Nucleated Red Blood Cells %   0.0   


 


Neutrophils #   6.8   


 


Lymphocytes #   2.9   


 


Monocytes #   0.6   


 


Eosinophils #   0.1   


 


Basophils #   0.0   


 


Nucleated Red Blood Cells #   0.0   


 


Sodium Level   136   


 


Potassium Level   4.1   


 


Chloride Level   100   


 


Carbon Dioxide Level   30   


 


Anion Gap   10   


 


Blood Urea Nitrogen   19   


 


Creatinine   0.92   


 


Glucose Level   164   


 


Calcium Level   10.7  H 














Test


  11/22/17


12:18 


  


  


 


 


Bedside Glucose 141     











Medications


Medications





 Current Medications


Hydralazine HCl (Apresoline) 10 mg Q6H  PRN IV SBP>170 Last administered on 11/ 21/17at 11:35; Admin Dose 10 MG;  Start 11/16/17 at 15:30


Insulin Glargine (Lantus) 21 unit DAILY@20 SC  Last administered on 11/21/17at 

20:39; Admin Dose 21 UNIT;  Start 11/16/17 at 20:00


Diagnostic Test (Pha) (Accu-Chek) 1 ea 02 XX ;  Start 11/17/17 at 02:00


Donepezil HCl (Aricept) 10 mg DAILY PO  Last administered on 11/22/17at 10:18; 

Admin Dose 10 MG;  Start 11/17/17 at 09:00


Lisinopril (Zestril) 40 mg DAILY PO  Last administered on 11/22/17at 10:16; 

Admin Dose 40 MG;  Start 11/17/17 at 09:00


Ranitidine HCl (Zantac) 300 mg HS PO  Last administered on 11/21/17at 20:08; 

Admin Dose 300 MG;  Start 11/16/17 at 21:00


Ondansetron HCl (Zofran Inj) 4 mg Q6H  PRN IV NAUSEA AND/OR VOMITING Last 

administered on 11/22/17at 13:57; Admin Dose 4 MG;  Start 11/16/17 at 16:00


Aspirin (Aspirin) 81 mg DAILY PO  Last administered on 11/22/17at 10:18; Admin 

Dose 81 MG;  Start 11/17/17 at 09:00


Acetaminophen (Tylenol Tab) 650 mg Q6H  PRN PO PAIN LEVEL 1-3 OR FEVER;  Start 

11/16/17 at 16:00


Enoxaparin Sodium (Lovenox) 30 mg DAILY SC  Last administered on 11/22/17at 10:

24; Admin Dose 30 MG;  Start 11/17/17 at 09:00


Miscellaneous Information 1 ea NOTE XX ;  Start 11/16/17 at 16:00


Glucose (Glutose) 15 gm Q15M  PRN PO DECREASED GLUCOSE;  Start 11/16/17 at 16:00


Glucose (Glutose) 22.5 gm Q15M  PRN PO DECREASED GLUCOSE;  Start 11/16/17 at 16:

00


Dextrose (D50w Syringe) 25 ml Q15M  PRN IV DECREASED GLUCOSE;  Start 11/16/17 

at 16:00


Dextrose (D50w Syringe) 50 ml Q15M  PRN IV DECREASED GLUCOSE;  Start 11/16/17 

at 16:00


Glucagon (Glucagen) 1 mg Q15M  PRN IM DECREASED GLUCOSE;  Start 11/16/17 at 16:

00


Glucose (Glutose) 15 gm Q15M  PRN BUCCAL DECREASED GLUCOSE;  Start 11/16/17 at 

16:00


Clonidine (Catapres) 0.1 mg Q4H  PRN PO SBP>170 Last administered on 11/21/17at 

14:54; Admin Dose 0.1 MG;  Start 11/17/17 at 18:00


Oxycodone HCl (Oxycontin) 10 mg AM PO  Last administered on 11/21/17at 08:32; 

Admin Dose 10 MG;  Start 11/18/17 at 09:00


Polyethylene Glycol (Miralax) 17 gm DAILY PO  Last administered on 11/22/17at 10

:19; Admin Dose 17 GM;  Start 11/18/17 at 09:00


Alprazolam (Xanax) 0.5 mg Q8H  PRN PO ANXIETY Last administered on 11/21/17at 20

:15; Admin Dose 0.5 MG;  Start 11/18/17 at 11:30


Phenol (Cepastat Lozenge) 1 lozenge Q1H  PRN MT COUGH;  Start 11/18/17 at 15:30


Gabapentin (Neurontin) 200 mg TID GTB  Last administered on 11/22/17at 13:48; 

Admin Dose 200 MG;  Start 11/19/17 at 15:00


Hydralazine HCl (Apresoline) 100 mg Q8 PO  Last administered on 11/22/17at 13:48

; Admin Dose 100 MG;  Start 11/20/17 at 14:00


Zolpidem Tartrate (Ambien) 5 mg HS  PRN PO INSOMNIA Last administered on 11/21/

17at 01:32; Admin Dose 5 MG;  Start 11/21/17 at 01:00


Nifedipine (Procardia Xl) 60 mg BID PO  Last administered on 11/22/17at 10:17; 

Admin Dose 60 MG;  Start 11/21/17 at 21:00


Metoprolol Tartrate (Lopressor) 50 mg BID PO  Last administered on 11/22/17at 10

:18; Admin Dose 50 MG;  Start 11/21/17 at 21:00


Doxazosin Mesylate (Cardura) 2 mg BID PO ;  Start 11/22/17 at 21:00











ALIRIO MC Nov 22, 2017 16:40

## 2017-11-22 NOTE — CONS
Date/Time of Note


Date/Time of Note


DATE: 11/22/17 


TIME: 14:10





Assessment/Plan


Assessment/Plan


Chief Complaint/Hosp Course


IMPRESSION:


1. Hypertensive urgency/emergency-still uncontrolled


2. Spinal stenosis


3. Diabetes mellitus.


4. Abdominal pain.


5. Arm weakness, left upper extremity.


6. Prerenal ischemia.


7. Mild leukocytosis.


8. Troponin-mildly positive on 3rd draw. No CP. Likely type 2 infarct in 

setting of HTN emergency-with no sig uptrend on serial ecg





Recc:


-Tele


-serial ecg's


-Continue ACEI/BB/hydralazine/CCB


-and start cardura to improve BP


-continue asa


Problems:  





Consultation Date/Type/Reason


Admit Date/Time


Nov 16, 2017 at 10:20


Initial Consult Date


11/16/2017


Type of Consultation:  cardiology


Reason for Consultation


HTN


Referring Provider:  ALIRIO MC





Exam/Review of Systems


Vital Signs


Vitals





 Vital Signs








  Date Time  Temp Pulse Resp B/P Pulse Ox O2 Delivery O2 Flow Rate FiO2


 


11/22/17 13:49  66  178/72    


 


11/22/17 11:47 98.3  18  98   


 


11/22/17 08:00      Nasal Cannula 2.0 














 Intake and Output   


 


 11/21/17 11/21/17 11/22/17





 15:00 23:00 07:00


 


Intake Total  700 ml 350 ml


 


Balance  700 ml 350 ml











Exam


Review of Systems:


CONSTITUTIONAL:  No fevers, chills.


PULMONARY:  No sob


CARDIOVASCULAR: No chest pain/palpitations


GASTROINTESTINAL:  No nausea/vomiting.


GENITOURINARY:  No hematuria/dysuria.


MUSCULOSKELETAL:  No myagias/arthalgias.


PSYCHIATRIC:  The patient denies depression.


NEUROLOGIC:   No weakness


Constitutional:  alert, oriented


Psych:  no complaints


Head:  normocephalic


ENMT:  mucosa pink and moist


Neck:  jvd, supple


Respiratory:  diminished breath sounds (at bases/B)


Cardiovascular:  regular rate and rhythm


Gastrointestinal:  non-tender, soft


Musculoskeletal:  muscle tone (normal)


Extremities:  edema (trace/B)


Neurological:  other (No focal deficits)





Results


Result Diagram:  


11/22/17 0647                                                                  

              11/22/17 0647





Results 24 hrs





Laboratory Tests








Test


  11/21/17


17:10 11/21/17


20:05 11/22/17


06:47 11/22/17


08:32


 


Bedside Glucose 150   153    221  H


 


White Blood Count   10.4   


 


Red Blood Count   4.82   


 


Hemoglobin   14.3   


 


Hematocrit   43.0   


 


Mean Corpuscular Volume   89.2   


 


Mean Corpuscular Hemoglobin   29.7   


 


Mean Corpuscular Hemoglobin


Concent 


  


  33.3  


  


 


 


Red Cell Distribution Width   14.5   


 


Platelet Count   288   


 


Mean Platelet Volume   9.7   


 


Neutrophils %   64.9   


 


Lymphocytes %   27.7   


 


Monocytes %   5.6   


 


Eosinophils %   1.1   


 


Basophils %   0.4   


 


Nucleated Red Blood Cells %   0.0   


 


Neutrophils #   6.8   


 


Lymphocytes #   2.9   


 


Monocytes #   0.6   


 


Eosinophils #   0.1   


 


Basophils #   0.0   


 


Nucleated Red Blood Cells #   0.0   


 


Sodium Level   136   


 


Potassium Level   4.1   


 


Chloride Level   100   


 


Carbon Dioxide Level   30   


 


Anion Gap   10   


 


Blood Urea Nitrogen   19   


 


Creatinine   0.92   


 


Glucose Level   164   


 


Calcium Level   10.7  H 














Test


  11/22/17


12:18 


  


  


 


 


Bedside Glucose 141     











Medications


Medications





 Current Medications


Hydralazine HCl (Apresoline) 10 mg Q6H  PRN IV SBP>170 Last administered on 11/ 21/17at 11:35; Admin Dose 10 MG;  Start 11/16/17 at 15:30


Insulin Glargine (Lantus) 21 unit DAILY@20 SC  Last administered on 11/21/17at 

20:39; Admin Dose 21 UNIT;  Start 11/16/17 at 20:00


Diagnostic Test (Pha) (Accu-Chek) 1 ea 02 XX ;  Start 11/17/17 at 02:00


Donepezil HCl (Aricept) 10 mg DAILY PO  Last administered on 11/22/17at 10:18; 

Admin Dose 10 MG;  Start 11/17/17 at 09:00


Lisinopril (Zestril) 40 mg DAILY PO  Last administered on 11/22/17at 10:16; 

Admin Dose 40 MG;  Start 11/17/17 at 09:00


Ranitidine HCl (Zantac) 300 mg HS PO  Last administered on 11/21/17at 20:08; 

Admin Dose 300 MG;  Start 11/16/17 at 21:00


Ondansetron HCl (Zofran Inj) 4 mg Q6H  PRN IV NAUSEA AND/OR VOMITING Last 

administered on 11/22/17at 13:57; Admin Dose 4 MG;  Start 11/16/17 at 16:00


Aspirin (Aspirin) 81 mg DAILY PO  Last administered on 11/22/17at 10:18; Admin 

Dose 81 MG;  Start 11/17/17 at 09:00


Acetaminophen (Tylenol Tab) 650 mg Q6H  PRN PO PAIN LEVEL 1-3 OR FEVER;  Start 

11/16/17 at 16:00


Enoxaparin Sodium (Lovenox) 30 mg DAILY SC  Last administered on 11/22/17at 10:

24; Admin Dose 30 MG;  Start 11/17/17 at 09:00


Miscellaneous Information 1 ea NOTE XX ;  Start 11/16/17 at 16:00


Glucose (Glutose) 15 gm Q15M  PRN PO DECREASED GLUCOSE;  Start 11/16/17 at 16:00


Glucose (Glutose) 22.5 gm Q15M  PRN PO DECREASED GLUCOSE;  Start 11/16/17 at 16:

00


Dextrose (D50w Syringe) 25 ml Q15M  PRN IV DECREASED GLUCOSE;  Start 11/16/17 

at 16:00


Dextrose (D50w Syringe) 50 ml Q15M  PRN IV DECREASED GLUCOSE;  Start 11/16/17 

at 16:00


Glucagon (Glucagen) 1 mg Q15M  PRN IM DECREASED GLUCOSE;  Start 11/16/17 at 16:

00


Glucose (Glutose) 15 gm Q15M  PRN BUCCAL DECREASED GLUCOSE;  Start 11/16/17 at 

16:00


Clonidine (Catapres) 0.1 mg Q4H  PRN PO SBP>170 Last administered on 11/21/17at 

14:54; Admin Dose 0.1 MG;  Start 11/17/17 at 18:00


Oxycodone HCl (Oxycontin) 10 mg AM PO  Last administered on 11/21/17at 08:32; 

Admin Dose 10 MG;  Start 11/18/17 at 09:00


Polyethylene Glycol (Miralax) 17 gm DAILY PO  Last administered on 11/22/17at 10

:19; Admin Dose 17 GM;  Start 11/18/17 at 09:00


Alprazolam (Xanax) 0.5 mg Q8H  PRN PO ANXIETY Last administered on 11/21/17at 20

:15; Admin Dose 0.5 MG;  Start 11/18/17 at 11:30


Phenol (Cepastat Lozenge) 1 lozenge Q1H  PRN MT COUGH;  Start 11/18/17 at 15:30


Gabapentin (Neurontin) 200 mg TID GTB  Last administered on 11/22/17at 13:48; 

Admin Dose 200 MG;  Start 11/19/17 at 15:00


Hydralazine HCl (Apresoline) 100 mg Q8 PO  Last administered on 11/22/17at 13:48

; Admin Dose 100 MG;  Start 11/20/17 at 14:00


Zolpidem Tartrate (Ambien) 5 mg HS  PRN PO INSOMNIA Last administered on 11/21/ 17at 01:32; Admin Dose 5 MG;  Start 11/21/17 at 01:00


Nifedipine (Procardia Xl) 60 mg BID PO  Last administered on 11/22/17at 10:17; 

Admin Dose 60 MG;  Start 11/21/17 at 21:00


Metoprolol Tartrate (Lopressor) 50 mg BID PO  Last administered on 11/22/17at 10

:18; Admin Dose 50 MG;  Start 11/21/17 at 21:00











ZULEIKA DUENAS Nov 22, 2017 14:13

## 2017-11-23 VITALS — SYSTOLIC BLOOD PRESSURE: 103 MMHG | RESPIRATION RATE: 17 BRPM | DIASTOLIC BLOOD PRESSURE: 47 MMHG

## 2017-11-23 VITALS — DIASTOLIC BLOOD PRESSURE: 55 MMHG | RESPIRATION RATE: 18 BRPM | SYSTOLIC BLOOD PRESSURE: 109 MMHG

## 2017-11-23 VITALS — HEART RATE: 63 BPM

## 2017-11-23 VITALS — HEART RATE: 71 BPM

## 2017-11-23 VITALS — DIASTOLIC BLOOD PRESSURE: 56 MMHG | SYSTOLIC BLOOD PRESSURE: 119 MMHG | RESPIRATION RATE: 16 BRPM

## 2017-11-23 VITALS — HEART RATE: 65 BPM

## 2017-11-23 VITALS — RESPIRATION RATE: 16 BRPM | SYSTOLIC BLOOD PRESSURE: 120 MMHG | DIASTOLIC BLOOD PRESSURE: 57 MMHG

## 2017-11-23 VITALS — HEART RATE: 53 BPM

## 2017-11-23 VITALS — HEART RATE: 62 BPM

## 2017-11-23 VITALS — SYSTOLIC BLOOD PRESSURE: 113 MMHG | DIASTOLIC BLOOD PRESSURE: 58 MMHG | RESPIRATION RATE: 20 BRPM

## 2017-11-23 VITALS — SYSTOLIC BLOOD PRESSURE: 131 MMHG | DIASTOLIC BLOOD PRESSURE: 60 MMHG

## 2017-11-23 LAB
ANION GAP SERPL CALC-SCNC: 12 MMOL/L (ref 8–16)
BUN SERPL-MCNC: 27 MG/DL (ref 7–20)
CALCIUM SERPL-MCNC: 10.5 MG/DL (ref 8.4–10.2)
CHLORIDE SERPL-SCNC: 98 MMOL/L (ref 97–110)
CO2 SERPL-SCNC: 29 MMOL/L (ref 21–31)
CREAT SERPL-MCNC: 1.24 MG/DL (ref 0.44–1)
GLUCOSE SERPL-MCNC: 154 MG/DL (ref 70–220)
POTASSIUM SERPL-SCNC: 4.5 MMOL/L (ref 3.5–5.1)
SODIUM SERPL-SCNC: 134 MMOL/L (ref 135–144)

## 2017-11-23 RX ADMIN — DEXAMETHASONE SODIUM PHOSPHATE PRN MG: 10 INJECTION, SOLUTION INTRAMUSCULAR; INTRAVENOUS at 11:07

## 2017-11-23 RX ADMIN — ALPRAZOLAM PRN MG: 0.5 TABLET ORAL at 21:15

## 2017-11-23 RX ADMIN — METOPROLOL TARTRATE SCH MG: 50 TABLET, FILM COATED ORAL at 08:41

## 2017-11-23 RX ADMIN — ENOXAPARIN SODIUM SCH MG: 100 INJECTION SUBCUTANEOUS at 08:49

## 2017-11-23 RX ADMIN — NIFEDIPINE SCH MG: 60 TABLET, FILM COATED, EXTENDED RELEASE ORAL at 21:00

## 2017-11-23 RX ADMIN — GABAPENTIN SCH MG: 100 CAPSULE ORAL at 21:02

## 2017-11-23 RX ADMIN — DOXAZOSIN MESYLATE SCH MG: 2 TABLET ORAL at 08:41

## 2017-11-23 RX ADMIN — RANITIDINE SCH MG: 150 TABLET ORAL at 21:04

## 2017-11-23 RX ADMIN — LISINOPRIL SCH MG: 20 TABLET ORAL at 08:41

## 2017-11-23 RX ADMIN — OXYCODONE HYDROCHLORIDE SCH MG: 10 TABLET, FILM COATED, EXTENDED RELEASE ORAL at 08:42

## 2017-11-23 RX ADMIN — INSULIN GLARGINE SCH UNIT: 100 INJECTION, SOLUTION SUBCUTANEOUS at 21:13

## 2017-11-23 RX ADMIN — POLYETHYLENE GLYCOL 3350 SCH GM: 17 POWDER, FOR SOLUTION ORAL at 08:42

## 2017-11-23 RX ADMIN — ASPIRIN 81 MG SCH MG: 81 TABLET ORAL at 08:41

## 2017-11-23 RX ADMIN — GABAPENTIN SCH MG: 100 CAPSULE ORAL at 08:40

## 2017-11-23 RX ADMIN — NIFEDIPINE SCH MG: 60 TABLET, FILM COATED, EXTENDED RELEASE ORAL at 08:40

## 2017-11-23 RX ADMIN — GABAPENTIN SCH MG: 100 CAPSULE ORAL at 12:33

## 2017-11-23 RX ADMIN — DONEPEZIL HYDROCHLORIDE SCH MG: 10 TABLET ORAL at 08:41

## 2017-11-23 RX ADMIN — DOXAZOSIN MESYLATE SCH MG: 2 TABLET ORAL at 21:00

## 2017-11-23 RX ADMIN — METOPROLOL TARTRATE SCH MG: 50 TABLET, FILM COATED ORAL at 21:00

## 2017-11-23 NOTE — CONS
Date/Time of Note


Date/Time of Note


DATE: 11/23/17 


TIME: 12:17





Assessment/Plan


Assessment/Plan


Additional Assessment/Plan


1. Hypertensive urgency/emergency-still uncontrolled - still on high side, add 

BP Rx now - better now, will monitor clinically, 


2. Spinal stenosis - per ortho - defer 


3. Diabetes mellitus- ont o keep euglycemic now. 


4. Abdominal pain - better. 


5. Arm weakness, left upper extremity.


6. Prerenal ischemia- renal team follows, avoid nephrotoxic meds. 


7. Mild leukocytosis.


8. Troponin-mildly positive on 3rd draw. No CP. Likely type 2 infarct in 

setting of HTN emergency-with no sig uptrend on serial ecg - no intervention 

palnned now.





Consultation Date/Type/Reason


Admit Date/Time


Nov 16, 2017 at 10:20


Initial Consult Date


11/17/17


Type of Consultation:  cardiology


Referring Provider:  ALIRIO MC





24 HR Interval Summary


Free Text/Dictation


No acute events - pt feels better - more alert and awake - will Rx medically 

for now





ROS: No fever, no chills, no nausea, no vomiting, no diarrhea/constipation


        No recent weight changes


        No chest pain, no PND, no orthopnea


        No dizziness, blurred vision


        No thirst, no heat or cold intolerance





Exam/Review of Systems


Vital Signs


Vitals





 Vital Signs








  Date Time  Temp Pulse Resp B/P Pulse Ox O2 Delivery O2 Flow Rate FiO2


 


11/23/17 11:18 98.1 67 18 109/55 98   


 


11/22/17 20:00      Nasal Cannula 2.0 














 Intake and Output   


 


 11/22/17 11/22/17 11/23/17





 15:00 23:00 07:00


 


Intake Total  1260 ml 300 ml


 


Balance  1260 ml 300 ml











Exam


General: WN/WD/NAD, AOx 3  [place] 


HEENT: Unicetric/atraumatic/EOMI (follow commands)


NECK: JVD elevated, no thyromegaly


Lymph: no lymphadenopathy


HEART: regular with no S3, II/VI systolic murmur at apex


LUNGS: Coarse sounds


ABD: soft, NT, ND, +BS


: Intact


Neuro: post CVA


SKIN: chronic changes


EXT: trace edema





Results


Result Diagram:  


11/22/17 0647                                                                  

              11/23/17 0619





Results 24 hrs





Laboratory Tests








Test


  11/22/17


12:18 11/22/17


17:54 11/22/17


20:30 11/23/17


06:19


 


Bedside Glucose 141   102   132   


 


Sodium Level    134  L


 


Potassium Level    4.5  


 


Chloride Level    98  


 


Carbon Dioxide Level    29  


 


Anion Gap    12  


 


Blood Urea Nitrogen    27  H


 


Creatinine    1.24  H


 


Glucose Level    154  


 


Calcium Level    10.5  H














Test


  11/23/17


08:38 


  


  


 


 


Bedside Glucose 282  H   











Medications


Medications





 Current Medications


Hydralazine HCl (Apresoline) 10 mg Q6H  PRN IV SBP>170 Last administered on 11/ 22/17at 17:48; Admin Dose 10 MG;  Start 11/16/17 at 15:30


Insulin Glargine (Lantus) 21 unit DAILY@20 SC  Last administered on 11/22/17at 

21:11; Admin Dose 21 UNIT;  Start 11/16/17 at 20:00


Diagnostic Test (Pha) (Accu-Chek) 1 ea 02 XX ;  Start 11/17/17 at 02:00


Donepezil HCl (Aricept) 10 mg DAILY PO  Last administered on 11/23/17at 08:41; 

Admin Dose 10 MG;  Start 11/17/17 at 09:00


Lisinopril (Zestril) 40 mg DAILY PO  Last administered on 11/23/17at 08:41; 

Admin Dose 40 MG;  Start 11/17/17 at 09:00


Ranitidine HCl (Zantac) 300 mg HS PO  Last administered on 11/22/17at 20:54; 

Admin Dose 300 MG;  Start 11/16/17 at 21:00


Ondansetron HCl (Zofran Inj) 4 mg Q6H  PRN IV NAUSEA AND/OR VOMITING Last 

administered on 11/23/17at 11:07; Admin Dose 4 MG;  Start 11/16/17 at 16:00


Aspirin (Aspirin) 81 mg DAILY PO  Last administered on 11/23/17at 08:41; Admin 

Dose 81 MG;  Start 11/17/17 at 09:00


Acetaminophen (Tylenol Tab) 650 mg Q6H  PRN PO PAIN LEVEL 1-3 OR FEVER Last 

administered on 11/22/17at 20:54; Admin Dose 650 MG;  Start 11/16/17 at 16:00


Enoxaparin Sodium (Lovenox) 30 mg DAILY SC  Last administered on 11/23/17at 08:

49; Admin Dose 30 MG;  Start 11/17/17 at 09:00


Miscellaneous Information 1 ea NOTE XX ;  Start 11/16/17 at 16:00


Glucose (Glutose) 15 gm Q15M  PRN PO DECREASED GLUCOSE;  Start 11/16/17 at 16:00


Glucose (Glutose) 22.5 gm Q15M  PRN PO DECREASED GLUCOSE;  Start 11/16/17 at 16:

00


Dextrose (D50w Syringe) 25 ml Q15M  PRN IV DECREASED GLUCOSE;  Start 11/16/17 

at 16:00


Dextrose (D50w Syringe) 50 ml Q15M  PRN IV DECREASED GLUCOSE;  Start 11/16/17 

at 16:00


Glucagon (Glucagen) 1 mg Q15M  PRN IM DECREASED GLUCOSE;  Start 11/16/17 at 16:

00


Glucose (Glutose) 15 gm Q15M  PRN BUCCAL DECREASED GLUCOSE;  Start 11/16/17 at 

16:00


Clonidine (Catapres) 0.1 mg Q4H  PRN PO SBP>170 Last administered on 11/21/17at 

14:54; Admin Dose 0.1 MG;  Start 11/17/17 at 18:00


Oxycodone HCl (Oxycontin) 10 mg AM PO  Last administered on 11/23/17at 08:42; 

Admin Dose 10 MG;  Start 11/18/17 at 09:00


Polyethylene Glycol (Miralax) 17 gm DAILY PO  Last administered on 11/23/17at 08

:42; Admin Dose 17 GM;  Start 11/18/17 at 09:00


Alprazolam (Xanax) 0.5 mg Q8H  PRN PO ANXIETY Last administered on 11/21/17at 20

:15; Admin Dose 0.5 MG;  Start 11/18/17 at 11:30


Phenol (Cepastat Lozenge) 1 lozenge Q1H  PRN MT COUGH;  Start 11/18/17 at 15:30


Gabapentin (Neurontin) 200 mg TID GTB  Last administered on 11/23/17at 08:40; 

Admin Dose 200 MG;  Start 11/19/17 at 15:00


Hydralazine HCl (Apresoline) 100 mg Q8 PO  Last administered on 11/23/17at 05:59

; Admin Dose 100 MG;  Start 11/20/17 at 14:00


Zolpidem Tartrate (Ambien) 5 mg HS  PRN PO INSOMNIA Last administered on 11/21/ 17at 01:32; Admin Dose 5 MG;  Start 11/21/17 at 01:00


Nifedipine (Procardia Xl) 60 mg BID PO  Last administered on 11/23/17at 08:40; 

Admin Dose 60 MG;  Start 11/21/17 at 21:00


Metoprolol Tartrate (Lopressor) 50 mg BID PO  Last administered on 11/23/17at 08

:41; Admin Dose 50 MG;  Start 11/21/17 at 21:00


Doxazosin Mesylate (Cardura) 2 mg BID PO  Last administered on 11/23/17at 08:41

; Admin Dose 2 MG;  Start 11/22/17 at 21:00











HEYDI JUDD MD Nov 23, 2017 12:18

## 2017-11-23 NOTE — PN
Date/Time of Note


Date/Time of Note


DATE: 11/23/17 


TIME: 15:02





Assessment/Plan


VTE Prophylaxis


VTE Prophylaxis Intervention:  other





Lines/Catheters


IV Catheter Type (from [facility]):  Saline Lock


Urinary Cath still in place:  No





Assessment/Plan


Assessment/Plan


-Cervical radiculopathy with central canal stenosis, Dr. Escoto neurology 

consult is appreciated.  Dr. Pennington is following patient for pain management.


-Hypertensive urgency, continue hydralazine


-Elevated troponin. Dr. Eldridge is following in cardiology consultation.


-Diabetes mellitus, continue Lantus, pre-meal NovoLog and NovoLog per sliding 

scale.


-Obesity with BMI of 36.2


-History of gastritis, continue PPI





For ARU rehab placement. Further recommendations based on clinical course.  

Plan of care discussed with Dr. Maza





Subjective


24 Hr Interval Summary


ENT:  no complaints


Respiratory:  no complaints


Cardiovascular:  no complaints


Gastrointestinal:  no complaints


Musculoskeletal:  other





Exam/Review of Systems


Vital Signs


Vitals





 Vital Signs








  Date Time  Temp Pulse Resp B/P Pulse Ox O2 Delivery O2 Flow Rate FiO2


 


11/23/17 12:24  65      


 


11/23/17 11:18 98.1  18 109/55 98   


 


11/22/17 20:00      Nasal Cannula 2.0 














 Intake and Output   


 


 11/22/17 11/22/17 11/23/17





 15:00 23:00 07:00


 


Intake Total  1260 ml 300 ml


 


Balance  1260 ml 300 ml











Exam


Constitutional:  alert, obese, oriented


Respiratory:  clear to auscultation


Gastrointestinal:  non-tender, soft


Musculoskeletal:  nl extremities to inspection


Extremities:  normal pulses


Neurological:  other





Results


Result Diagram:  


11/22/17 0647                                                                  

              11/23/17 0619





Results 24 hrs





Laboratory Tests








Test


  11/22/17


17:54 11/22/17


20:30 11/23/17


06:19 11/23/17


08:38


 


Bedside Glucose 102   132    282  H


 


Sodium Level   134  L 


 


Potassium Level   4.5   


 


Chloride Level   98   


 


Carbon Dioxide Level   29   


 


Anion Gap   12   


 


Blood Urea Nitrogen   27  H 


 


Creatinine   1.24  H 


 


Glucose Level   154   


 


Calcium Level   10.5  H 














Test


  11/23/17


12:32 


  


  


 


 


Bedside Glucose 167     











Medications


Medications





 Current Medications


Hydralazine HCl (Apresoline) 10 mg Q6H  PRN IV SBP>170 Last administered on 11/ 22/17at 17:48; Admin Dose 10 MG;  Start 11/16/17 at 15:30


Insulin Glargine (Lantus) 21 unit DAILY@20 SC  Last administered on 11/22/17at 

21:11; Admin Dose 21 UNIT;  Start 11/16/17 at 20:00


Diagnostic Test (Pha) (Accu-Chek) 1 ea 02 XX ;  Start 11/17/17 at 02:00


Donepezil HCl (Aricept) 10 mg DAILY PO  Last administered on 11/23/17at 08:41; 

Admin Dose 10 MG;  Start 11/17/17 at 09:00


Lisinopril (Zestril) 40 mg DAILY PO  Last administered on 11/23/17at 08:41; 

Admin Dose 40 MG;  Start 11/17/17 at 09:00


Ranitidine HCl (Zantac) 300 mg HS PO  Last administered on 11/22/17at 20:54; 

Admin Dose 300 MG;  Start 11/16/17 at 21:00


Ondansetron HCl (Zofran Inj) 4 mg Q6H  PRN IV NAUSEA AND/OR VOMITING Last 

administered on 11/23/17at 11:07; Admin Dose 4 MG;  Start 11/16/17 at 16:00


Aspirin (Aspirin) 81 mg DAILY PO  Last administered on 11/23/17at 08:41; Admin 

Dose 81 MG;  Start 11/17/17 at 09:00


Acetaminophen (Tylenol Tab) 650 mg Q6H  PRN PO PAIN LEVEL 1-3 OR FEVER Last 

administered on 11/22/17at 20:54; Admin Dose 650 MG;  Start 11/16/17 at 16:00


Enoxaparin Sodium (Lovenox) 30 mg DAILY SC  Last administered on 11/23/17at 08:

49; Admin Dose 30 MG;  Start 11/17/17 at 09:00


Miscellaneous Information 1 ea NOTE XX ;  Start 11/16/17 at 16:00


Glucose (Glutose) 15 gm Q15M  PRN PO DECREASED GLUCOSE;  Start 11/16/17 at 16:00


Glucose (Glutose) 22.5 gm Q15M  PRN PO DECREASED GLUCOSE;  Start 11/16/17 at 16:

00


Dextrose (D50w Syringe) 25 ml Q15M  PRN IV DECREASED GLUCOSE;  Start 11/16/17 

at 16:00


Dextrose (D50w Syringe) 50 ml Q15M  PRN IV DECREASED GLUCOSE;  Start 11/16/17 

at 16:00


Glucagon (Glucagen) 1 mg Q15M  PRN IM DECREASED GLUCOSE;  Start 11/16/17 at 16:

00


Glucose (Glutose) 15 gm Q15M  PRN BUCCAL DECREASED GLUCOSE;  Start 11/16/17 at 

16:00


Clonidine (Catapres) 0.1 mg Q4H  PRN PO SBP>170 Last administered on 11/21/17at 

14:54; Admin Dose 0.1 MG;  Start 11/17/17 at 18:00


Oxycodone HCl (Oxycontin) 10 mg AM PO  Last administered on 11/23/17at 08:42; 

Admin Dose 10 MG;  Start 11/18/17 at 09:00


Polyethylene Glycol (Miralax) 17 gm DAILY PO  Last administered on 11/23/17at 08

:42; Admin Dose 17 GM;  Start 11/18/17 at 09:00


Alprazolam (Xanax) 0.5 mg Q8H  PRN PO ANXIETY Last administered on 11/21/17at 20

:15; Admin Dose 0.5 MG;  Start 11/18/17 at 11:30


Phenol (Cepastat Lozenge) 1 lozenge Q1H  PRN MT COUGH;  Start 11/18/17 at 15:30


Gabapentin (Neurontin) 200 mg TID GTB  Last administered on 11/23/17at 12:33; 

Admin Dose 200 MG;  Start 11/19/17 at 15:00


Hydralazine HCl (Apresoline) 100 mg Q8 PO  Last administered on 11/23/17at 05:59

; Admin Dose 100 MG;  Start 11/20/17 at 14:00


Zolpidem Tartrate (Ambien) 5 mg HS  PRN PO INSOMNIA Last administered on 11/21/ 17at 01:32; Admin Dose 5 MG;  Start 11/21/17 at 01:00


Nifedipine (Procardia Xl) 60 mg BID PO  Last administered on 11/23/17at 08:40; 

Admin Dose 60 MG;  Start 11/21/17 at 21:00


Metoprolol Tartrate (Lopressor) 50 mg BID PO  Last administered on 11/23/17at 08

:41; Admin Dose 50 MG;  Start 11/21/17 at 21:00


Doxazosin Mesylate (Cardura) 2 mg BID PO  Last administered on 11/23/17at 08:41

; Admin Dose 2 MG;  Start 11/22/17 at 21:00











IMAN HOANG Nov 23, 2017 15:04

## 2017-11-24 VITALS — DIASTOLIC BLOOD PRESSURE: 65 MMHG | SYSTOLIC BLOOD PRESSURE: 141 MMHG | RESPIRATION RATE: 20 BRPM

## 2017-11-24 VITALS — RESPIRATION RATE: 17 BRPM | SYSTOLIC BLOOD PRESSURE: 108 MMHG | DIASTOLIC BLOOD PRESSURE: 57 MMHG

## 2017-11-24 VITALS — HEART RATE: 72 BPM

## 2017-11-24 VITALS — HEART RATE: 62 BPM

## 2017-11-24 VITALS — HEART RATE: 94 BPM

## 2017-11-24 VITALS — SYSTOLIC BLOOD PRESSURE: 154 MMHG | RESPIRATION RATE: 18 BRPM | DIASTOLIC BLOOD PRESSURE: 78 MMHG

## 2017-11-24 VITALS — DIASTOLIC BLOOD PRESSURE: 65 MMHG | RESPIRATION RATE: 17 BRPM | SYSTOLIC BLOOD PRESSURE: 118 MMHG

## 2017-11-24 VITALS — HEART RATE: 74 BPM

## 2017-11-24 VITALS — DIASTOLIC BLOOD PRESSURE: 53 MMHG | SYSTOLIC BLOOD PRESSURE: 103 MMHG | RESPIRATION RATE: 17 BRPM

## 2017-11-24 VITALS — HEART RATE: 70 BPM

## 2017-11-24 VITALS — SYSTOLIC BLOOD PRESSURE: 150 MMHG | RESPIRATION RATE: 18 BRPM | DIASTOLIC BLOOD PRESSURE: 65 MMHG

## 2017-11-24 LAB
ANION GAP SERPL CALC-SCNC: 12 MMOL/L (ref 8–16)
BASOPHILS # BLD AUTO: 0 10^3/UL (ref 0–0.1)
BASOPHILS NFR BLD: 0.2 % (ref 0–2)
BUN SERPL-MCNC: 27 MG/DL (ref 7–20)
CALCIUM SERPL-MCNC: 10.4 MG/DL (ref 8.4–10.2)
CHLORIDE SERPL-SCNC: 100 MMOL/L (ref 97–110)
CO2 SERPL-SCNC: 30 MMOL/L (ref 21–31)
CREAT SERPL-MCNC: 1.08 MG/DL (ref 0.44–1)
EOSINOPHIL # BLD: 0.1 10^3/UL (ref 0–0.5)
EOSINOPHIL NFR BLD: 1.1 % (ref 0–7)
ERYTHROCYTE [DISTWIDTH] IN BLOOD BY AUTOMATED COUNT: 14.5 % (ref 11.5–14.5)
GLUCOSE SERPL-MCNC: 168 MG/DL (ref 70–220)
HCT VFR BLD CALC: 36.7 % (ref 37–47)
HGB BLD-MCNC: 12.2 G/DL (ref 12–16)
LYMPHOCYTES # BLD AUTO: 1.9 10^3/UL (ref 0.8–2.9)
LYMPHOCYTES NFR BLD AUTO: 31.3 % (ref 15–51)
MCH RBC QN AUTO: 30 PG (ref 29–33)
MCHC RBC AUTO-ENTMCNC: 33.2 G/DL (ref 32–37)
MCV RBC AUTO: 90.4 FL (ref 82–101)
MONOCYTES # BLD: 0.4 10^3/UL (ref 0.3–0.9)
MONOCYTES NFR BLD: 5.7 % (ref 0–11)
NEUTROPHILS # BLD: 3.8 10^3/UL (ref 1.6–7.5)
NEUTROPHILS NFR BLD AUTO: 61.4 % (ref 39–77)
NRBC # BLD MANUAL: 0 10^3/UL (ref 0–0)
NRBC BLD AUTO-RTO: 0 /100WBC (ref 0–0)
PLATELET # BLD: 234 10^3/UL (ref 140–415)
PMV BLD AUTO: 10.2 FL (ref 7.4–10.4)
POTASSIUM SERPL-SCNC: 4.3 MMOL/L (ref 3.5–5.1)
RBC # BLD AUTO: 4.06 10^6/UL (ref 4.2–5.4)
SODIUM SERPL-SCNC: 138 MMOL/L (ref 135–144)
WBC # BLD AUTO: 6.2 10^3/UL (ref 4.8–10.8)

## 2017-11-24 RX ADMIN — POLYETHYLENE GLYCOL 3350 SCH GM: 17 POWDER, FOR SOLUTION ORAL at 09:29

## 2017-11-24 RX ADMIN — GABAPENTIN SCH MG: 100 CAPSULE ORAL at 20:51

## 2017-11-24 RX ADMIN — NIFEDIPINE SCH MG: 60 TABLET, FILM COATED, EXTENDED RELEASE ORAL at 09:29

## 2017-11-24 RX ADMIN — NIFEDIPINE SCH MG: 60 TABLET, FILM COATED, EXTENDED RELEASE ORAL at 20:52

## 2017-11-24 RX ADMIN — ASPIRIN 81 MG SCH MG: 81 TABLET ORAL at 09:29

## 2017-11-24 RX ADMIN — RANITIDINE SCH MG: 150 TABLET ORAL at 20:52

## 2017-11-24 RX ADMIN — LISINOPRIL SCH MG: 20 TABLET ORAL at 09:30

## 2017-11-24 RX ADMIN — INSULIN GLARGINE SCH UNIT: 100 INJECTION, SOLUTION SUBCUTANEOUS at 20:58

## 2017-11-24 RX ADMIN — DONEPEZIL HYDROCHLORIDE SCH MG: 10 TABLET ORAL at 09:29

## 2017-11-24 RX ADMIN — ENOXAPARIN SODIUM SCH MG: 100 INJECTION SUBCUTANEOUS at 09:34

## 2017-11-24 RX ADMIN — METOPROLOL TARTRATE SCH MG: 50 TABLET, FILM COATED ORAL at 20:53

## 2017-11-24 RX ADMIN — ZOLPIDEM TARTRATE PRN MG: 5 TABLET, FILM COATED ORAL at 21:02

## 2017-11-24 RX ADMIN — DOXAZOSIN MESYLATE SCH MG: 2 TABLET ORAL at 09:30

## 2017-11-24 RX ADMIN — OXYCODONE HYDROCHLORIDE SCH MG: 10 TABLET, FILM COATED, EXTENDED RELEASE ORAL at 09:30

## 2017-11-24 RX ADMIN — GABAPENTIN SCH MG: 100 CAPSULE ORAL at 13:25

## 2017-11-24 RX ADMIN — DOXAZOSIN MESYLATE SCH MG: 2 TABLET ORAL at 20:52

## 2017-11-24 RX ADMIN — GABAPENTIN SCH MG: 100 CAPSULE ORAL at 09:30

## 2017-11-24 RX ADMIN — METOPROLOL TARTRATE SCH MG: 50 TABLET, FILM COATED ORAL at 09:29

## 2017-11-24 NOTE — CONS
Date/Time of Note


Date/Time of Note


DATE: 11/24/17 


TIME: 13:50





Assessment/Plan


Assessment/Plan


Additional Assessment/Plan


1. Hypertensive urgency/emergency-still uncontrolled - still on high side, add 

BP Rx now - better now, will monitor clinically, WILL MONITPR CLINICALLY NOW - 

much better overall. 


2. Spinal stenosis - per ortho - defer 


3. Diabetes mellitus- ont o keep euglycemic now. 


4. Abdominal pain - better. 


5. Arm weakness, left upper extremity.


6. Prerenal ischemia- renal team follows, avoid nephrotoxic meds. 


7. Mild leukocytosis.


8. Troponin-mildly positive on 3rd draw. No CP. Likely type 2 infarct in 

setting of HTN emergency-with no sig uptrend on serial ecg - no intervention 

planned now. OUTPT f/up as needed.





Consultation Date/Type/Reason


Admit Date/Time


Nov 16, 2017 at 10:20


Initial Consult Date


11/17/17


Type of Consultation:  cardiology


Referring Provider:  ALIRIO MC





24 HR Interval Summary


Free Text/Dictation


Much better overall - no significant ectopy on tele. 





ROS: No fever, no chills, no nausea, no vomiting, no diarrhea/constipation


        No recent weight changes


        No chest pain, no PND, no orthopnea


        No dizziness, blurred vision


        No thirst, no heat or cold intolerance





Exam/Review of Systems


Vital Signs


Vitals





 Vital Signs








  Date Time  Temp Pulse Resp B/P Pulse Ox O2 Delivery O2 Flow Rate FiO2


 


11/24/17 12:31  70      


 


11/24/17 11:47 98.8  18 150/65 96   


 


11/22/17 20:00      Nasal Cannula 2.0 














 Intake and Output   


 


 11/23/17 11/23/17 11/24/17





 14:59 22:59 06:59


 


Intake Total  740 ml 500 ml


 


Balance  740 ml 500 ml











Exam


General: WN/WD/NAD, AOx 3 - up to chair 


HEENT: Unicetric/atraumatic/EOMI (follow commands)


NECK: JVD elevated, no thyromegaly


Lymph: no lymphadenopathy


HEART: regular with no S3, II/VI systolic murmur at apex


LUNGS: Coarse sounds


ABD: soft, NT, ND, +BS


: Intact


Neuro: post CVA


SKIN: chronic changes


EXT: trace edema





Results


Result Diagram:  


11/24/17 0641 11/24/17 0641





Results 24 hrs





Laboratory Tests








Test


  11/23/17


17:16 11/23/17


20:58 11/24/17


06:41 11/24/17


08:36


 


Bedside Glucose 172   150    224  H


 


White Blood Count   6.2  # 


 


Red Blood Count   4.06  L 


 


Hemoglobin   12.2   


 


Hematocrit   36.7  L 


 


Mean Corpuscular Volume   90.4   


 


Mean Corpuscular Hemoglobin   30.0   


 


Mean Corpuscular Hemoglobin


Concent 


  


  33.2  


  


 


 


Red Cell Distribution Width   14.5   


 


Platelet Count   234   


 


Mean Platelet Volume   10.2   


 


Neutrophils %   61.4   


 


Lymphocytes %   31.3   


 


Monocytes %   5.7   


 


Eosinophils %   1.1   


 


Basophils %   0.2   


 


Nucleated Red Blood Cells %   0.0   


 


Neutrophils #   3.8   


 


Lymphocytes #   1.9   


 


Monocytes #   0.4   


 


Eosinophils #   0.1   


 


Basophils #   0.0   


 


Nucleated Red Blood Cells #   0.0   


 


Sodium Level   138   


 


Potassium Level   4.3   


 


Chloride Level   100   


 


Carbon Dioxide Level   30   


 


Anion Gap   12   


 


Blood Urea Nitrogen   27  H 


 


Creatinine   1.08  H 


 


Glucose Level   168   


 


Calcium Level   10.4  H 














Test


  11/24/17


12:08 11/24/17


12:49 


  


 


 


Bedside Glucose 178     


 


Lab Scanned Report  REFERENCE LAB    











Medications


Medications





 Current Medications


Hydralazine HCl (Apresoline) 10 mg Q6H  PRN IV SBP>170 Last administered on 11/ 22/17at 17:48; Admin Dose 10 MG;  Start 11/16/17 at 15:30


Insulin Glargine (Lantus) 21 unit DAILY@20 SC  Last administered on 11/23/17at 

21:13; Admin Dose 21 UNIT;  Start 11/16/17 at 20:00


Diagnostic Test (Pha) (Accu-Chek) 1 ea 02 XX ;  Start 11/17/17 at 02:00


Donepezil HCl (Aricept) 10 mg DAILY PO  Last administered on 11/24/17at 09:29; 

Admin Dose 10 MG;  Start 11/17/17 at 09:00


Lisinopril (Zestril) 40 mg DAILY PO  Last administered on 11/24/17at 09:30; 

Admin Dose 40 MG;  Start 11/17/17 at 09:00


Ranitidine HCl (Zantac) 300 mg HS PO  Last administered on 11/23/17at 21:04; 

Admin Dose 300 MG;  Start 11/16/17 at 21:00


Ondansetron HCl (Zofran Inj) 4 mg Q6H  PRN IV NAUSEA AND/OR VOMITING Last 

administered on 11/23/17at 11:07; Admin Dose 4 MG;  Start 11/16/17 at 16:00


Aspirin (Aspirin) 81 mg DAILY PO  Last administered on 11/24/17at 09:29; Admin 

Dose 81 MG;  Start 11/17/17 at 09:00


Acetaminophen (Tylenol Tab) 650 mg Q6H  PRN PO PAIN LEVEL 1-3 OR FEVER Last 

administered on 11/22/17at 20:54; Admin Dose 650 MG;  Start 11/16/17 at 16:00


Enoxaparin Sodium (Lovenox) 30 mg DAILY SC  Last administered on 11/24/17at 09:

34; Admin Dose 30 MG;  Start 11/17/17 at 09:00


Miscellaneous Information 1 ea NOTE XX ;  Start 11/16/17 at 16:00


Glucose (Glutose) 15 gm Q15M  PRN PO DECREASED GLUCOSE;  Start 11/16/17 at 16:00


Glucose (Glutose) 22.5 gm Q15M  PRN PO DECREASED GLUCOSE;  Start 11/16/17 at 16:

00


Dextrose (D50w Syringe) 25 ml Q15M  PRN IV DECREASED GLUCOSE;  Start 11/16/17 

at 16:00


Dextrose (D50w Syringe) 50 ml Q15M  PRN IV DECREASED GLUCOSE;  Start 11/16/17 

at 16:00


Glucagon (Glucagen) 1 mg Q15M  PRN IM DECREASED GLUCOSE;  Start 11/16/17 at 16:

00


Glucose (Glutose) 15 gm Q15M  PRN BUCCAL DECREASED GLUCOSE;  Start 11/16/17 at 

16:00


Clonidine (Catapres) 0.1 mg Q4H  PRN PO SBP>170 Last administered on 11/21/17at 

14:54; Admin Dose 0.1 MG;  Start 11/17/17 at 18:00


Oxycodone HCl (Oxycontin) 10 mg AM PO  Last administered on 11/24/17at 09:30; 

Admin Dose 10 MG;  Start 11/18/17 at 09:00


Polyethylene Glycol (Miralax) 17 gm DAILY PO  Last administered on 11/24/17at 09

:29; Admin Dose 17 GM;  Start 11/18/17 at 09:00


Alprazolam (Xanax) 0.5 mg Q8H  PRN PO ANXIETY Last administered on 11/23/17at 21

:15; Admin Dose 0.5 MG;  Start 11/18/17 at 11:30


Phenol (Cepastat Lozenge) 1 lozenge Q1H  PRN MT COUGH;  Start 11/18/17 at 15:30


Gabapentin (Neurontin) 200 mg TID GTB  Last administered on 11/24/17at 13:25; 

Admin Dose 200 MG;  Start 11/19/17 at 15:00


Hydralazine HCl (Apresoline) 100 mg Q8 PO  Last administered on 11/24/17at 13:25

; Admin Dose 100 MG;  Start 11/20/17 at 14:00


Zolpidem Tartrate (Ambien) 5 mg HS  PRN PO INSOMNIA Last administered on 11/21/ 17at 01:32; Admin Dose 5 MG;  Start 11/21/17 at 01:00


Nifedipine (Procardia Xl) 60 mg BID PO  Last administered on 11/24/17at 09:29; 

Admin Dose 60 MG;  Start 11/21/17 at 21:00


Metoprolol Tartrate (Lopressor) 50 mg BID PO  Last administered on 11/24/17at 09

:29; Admin Dose 50 MG;  Start 11/21/17 at 21:00


Doxazosin Mesylate (Cardura) 2 mg BID PO  Last administered on 11/24/17at 09:30

; Admin Dose 2 MG;  Start 11/22/17 at 21:00











HEYDI JUDD MD Nov 24, 2017 13:51

## 2017-11-24 NOTE — PN
Date/Time of Note


Date/Time of Note


DATE: 11/24/17 


TIME: 15:54





Assessment/Plan


VTE Prophylaxis


VTE Prophylaxis Intervention:  SCD's





Lines/Catheters


IV Catheter Type (from Nrs):  Saline Lock


Urinary Cath still in place:  No





Assessment/Plan


Chief Complaint/Hosp Course


Blood sugar slightly elevated, will adjust Lantus and NovoLog, blood pressure 

is better controlled, okay for acute rehab transfer


Assessment/Plan


-Cervical radiculopathy with central canal stenosis, Dr. Escoto neurology 

consult is appreciated.  Dr. Pennington is following patient for pain management.


-Hypertensive urgency, continue hydralazine


-Elevated troponin. Dr. Eldridge is following in cardiology consultation.


-Diabetes mellitus, continue Lantus, pre-meal NovoLog and NovoLog per sliding 

scale.


-Obesity with BMI of 36.2


-History of gastritis, continue PPI





Further recommendations based on clinical course.  Plan of care discussed with 

Dr. Maza


Problems:  





Exam/Review of Systems


Vital Signs


Vitals





 Vital Signs








  Date Time  Temp Pulse Resp B/P Pulse Ox O2 Delivery O2 Flow Rate FiO2


 


11/24/17 12:31  70      


 


11/24/17 11:47 98.8  18 150/65 96   


 


11/22/17 20:00      Nasal Cannula 2.0 














 Intake and Output   


 


 11/23/17 11/23/17 11/24/17





 15:00 23:00 07:00


 


Intake Total  740 ml 500 ml


 


Balance  740 ml 500 ml











Exam


Constitutional:  alert, oriented


Neck:  supple


Respiratory:  normal air movement


Cardiovascular:  nl pulses


Gastrointestinal:  non-tender, soft


Musculoskeletal:  nl extremities to inspection


Neurological:  other (Left upper extremity weakness)





Results


Result Diagram:  


11/24/17 0641                                                                  

              11/24/17 0641





Results 24 hrs





Laboratory Tests








Test


  11/23/17


17:16 11/23/17


20:58 11/24/17


06:41 11/24/17


08:36


 


Bedside Glucose 172   150    224  H


 


White Blood Count   6.2  # 


 


Red Blood Count   4.06  L 


 


Hemoglobin   12.2   


 


Hematocrit   36.7  L 


 


Mean Corpuscular Volume   90.4   


 


Mean Corpuscular Hemoglobin   30.0   


 


Mean Corpuscular Hemoglobin


Concent 


  


  33.2  


  


 


 


Red Cell Distribution Width   14.5   


 


Platelet Count   234   


 


Mean Platelet Volume   10.2   


 


Neutrophils %   61.4   


 


Lymphocytes %   31.3   


 


Monocytes %   5.7   


 


Eosinophils %   1.1   


 


Basophils %   0.2   


 


Nucleated Red Blood Cells %   0.0   


 


Neutrophils #   3.8   


 


Lymphocytes #   1.9   


 


Monocytes #   0.4   


 


Eosinophils #   0.1   


 


Basophils #   0.0   


 


Nucleated Red Blood Cells #   0.0   


 


Sodium Level   138   


 


Potassium Level   4.3   


 


Chloride Level   100   


 


Carbon Dioxide Level   30   


 


Anion Gap   12   


 


Blood Urea Nitrogen   27  H 


 


Creatinine   1.08  H 


 


Glucose Level   168   


 


Calcium Level   10.4  H 














Test


  11/24/17


12:08 11/24/17


12:49 


  


 


 


Bedside Glucose 178     


 


Lab Scanned Report  REFERENCE LAB    











Medications


Medications





 Current Medications


Hydralazine HCl (Apresoline) 10 mg Q6H  PRN IV SBP>170 Last administered on 11/ 22/17at 17:48; Admin Dose 10 MG;  Start 11/16/17 at 15:30


Insulin Glargine (Lantus) 21 unit DAILY@20 SC  Last administered on 11/23/17at 

21:13; Admin Dose 21 UNIT;  Start 11/16/17 at 20:00


Diagnostic Test (Pha) (Accu-Chek) 1 ea 02 XX ;  Start 11/17/17 at 02:00


Donepezil HCl (Aricept) 10 mg DAILY PO  Last administered on 11/24/17at 09:29; 

Admin Dose 10 MG;  Start 11/17/17 at 09:00


Lisinopril (Zestril) 40 mg DAILY PO  Last administered on 11/24/17at 09:30; 

Admin Dose 40 MG;  Start 11/17/17 at 09:00


Ranitidine HCl (Zantac) 300 mg HS PO  Last administered on 11/23/17at 21:04; 

Admin Dose 300 MG;  Start 11/16/17 at 21:00


Ondansetron HCl (Zofran Inj) 4 mg Q6H  PRN IV NAUSEA AND/OR VOMITING Last 

administered on 11/23/17at 11:07; Admin Dose 4 MG;  Start 11/16/17 at 16:00


Aspirin (Aspirin) 81 mg DAILY PO  Last administered on 11/24/17at 09:29; Admin 

Dose 81 MG;  Start 11/17/17 at 09:00


Acetaminophen (Tylenol Tab) 650 mg Q6H  PRN PO PAIN LEVEL 1-3 OR FEVER Last 

administered on 11/22/17at 20:54; Admin Dose 650 MG;  Start 11/16/17 at 16:00


Enoxaparin Sodium (Lovenox) 30 mg DAILY SC  Last administered on 11/24/17at 09:

34; Admin Dose 30 MG;  Start 11/17/17 at 09:00


Miscellaneous Information 1 ea NOTE XX ;  Start 11/16/17 at 16:00


Glucose (Glutose) 15 gm Q15M  PRN PO DECREASED GLUCOSE;  Start 11/16/17 at 16:00


Glucose (Glutose) 22.5 gm Q15M  PRN PO DECREASED GLUCOSE;  Start 11/16/17 at 16:

00


Dextrose (D50w Syringe) 25 ml Q15M  PRN IV DECREASED GLUCOSE;  Start 11/16/17 

at 16:00


Dextrose (D50w Syringe) 50 ml Q15M  PRN IV DECREASED GLUCOSE;  Start 11/16/17 

at 16:00


Glucagon (Glucagen) 1 mg Q15M  PRN IM DECREASED GLUCOSE;  Start 11/16/17 at 16:

00


Glucose (Glutose) 15 gm Q15M  PRN BUCCAL DECREASED GLUCOSE;  Start 11/16/17 at 

16:00


Clonidine (Catapres) 0.1 mg Q4H  PRN PO SBP>170 Last administered on 11/21/17at 

14:54; Admin Dose 0.1 MG;  Start 11/17/17 at 18:00


Oxycodone HCl (Oxycontin) 10 mg AM PO  Last administered on 11/24/17at 09:30; 

Admin Dose 10 MG;  Start 11/18/17 at 09:00


Polyethylene Glycol (Miralax) 17 gm DAILY PO  Last administered on 11/24/17at 09

:29; Admin Dose 17 GM;  Start 11/18/17 at 09:00


Alprazolam (Xanax) 0.5 mg Q8H  PRN PO ANXIETY Last administered on 11/23/17at 21

:15; Admin Dose 0.5 MG;  Start 11/18/17 at 11:30


Phenol (Cepastat Lozenge) 1 lozenge Q1H  PRN MT COUGH;  Start 11/18/17 at 15:30


Gabapentin (Neurontin) 200 mg TID GTB  Last administered on 11/24/17at 13:25; 

Admin Dose 200 MG;  Start 11/19/17 at 15:00


Hydralazine HCl (Apresoline) 100 mg Q8 PO  Last administered on 11/24/17at 13:25

; Admin Dose 100 MG;  Start 11/20/17 at 14:00


Zolpidem Tartrate (Ambien) 5 mg HS  PRN PO INSOMNIA Last administered on 11/21/ 17at 01:32; Admin Dose 5 MG;  Start 11/21/17 at 01:00


Nifedipine (Procardia Xl) 60 mg BID PO  Last administered on 11/24/17at 09:29; 

Admin Dose 60 MG;  Start 11/21/17 at 21:00


Metoprolol Tartrate (Lopressor) 50 mg BID PO  Last administered on 11/24/17at 09

:29; Admin Dose 50 MG;  Start 11/21/17 at 21:00


Doxazosin Mesylate (Cardura) 2 mg BID PO  Last administered on 11/24/17at 09:30

; Admin Dose 2 MG;  Start 11/22/17 at 21:00











ALIRIO MC Nov 24, 2017 15:56

## 2017-11-25 VITALS — HEART RATE: 85 BPM | DIASTOLIC BLOOD PRESSURE: 65 MMHG | SYSTOLIC BLOOD PRESSURE: 143 MMHG

## 2017-11-25 VITALS — DIASTOLIC BLOOD PRESSURE: 61 MMHG | RESPIRATION RATE: 20 BRPM | SYSTOLIC BLOOD PRESSURE: 140 MMHG

## 2017-11-25 VITALS — HEART RATE: 75 BPM | RESPIRATION RATE: 19 BRPM | DIASTOLIC BLOOD PRESSURE: 60 MMHG | SYSTOLIC BLOOD PRESSURE: 129 MMHG

## 2017-11-25 VITALS — RESPIRATION RATE: 18 BRPM | DIASTOLIC BLOOD PRESSURE: 63 MMHG | SYSTOLIC BLOOD PRESSURE: 143 MMHG

## 2017-11-25 VITALS — DIASTOLIC BLOOD PRESSURE: 58 MMHG | SYSTOLIC BLOOD PRESSURE: 127 MMHG | RESPIRATION RATE: 18 BRPM

## 2017-11-25 VITALS — SYSTOLIC BLOOD PRESSURE: 104 MMHG | RESPIRATION RATE: 16 BRPM | DIASTOLIC BLOOD PRESSURE: 51 MMHG

## 2017-11-25 VITALS — HEART RATE: 58 BPM

## 2017-11-25 LAB
ANION GAP SERPL CALC-SCNC: 11 MMOL/L (ref 8–16)
BUN SERPL-MCNC: 25 MG/DL (ref 7–20)
CALCIUM SERPL-MCNC: 10.4 MG/DL (ref 8.4–10.2)
CHLORIDE SERPL-SCNC: 100 MMOL/L (ref 97–110)
CO2 SERPL-SCNC: 31 MMOL/L (ref 21–31)
CREAT SERPL-MCNC: 1 MG/DL (ref 0.44–1)
GLUCOSE SERPL-MCNC: 102 MG/DL (ref 70–220)
POTASSIUM SERPL-SCNC: 4.2 MMOL/L (ref 3.5–5.1)
SODIUM SERPL-SCNC: 138 MMOL/L (ref 135–144)

## 2017-11-25 RX ADMIN — DEXAMETHASONE SODIUM PHOSPHATE PRN MG: 10 INJECTION, SOLUTION INTRAMUSCULAR; INTRAVENOUS at 21:25

## 2017-11-25 RX ADMIN — METOPROLOL TARTRATE SCH MG: 50 TABLET, FILM COATED ORAL at 09:27

## 2017-11-25 RX ADMIN — RANITIDINE SCH MG: 150 TABLET ORAL at 21:25

## 2017-11-25 RX ADMIN — POLYETHYLENE GLYCOL 3350 SCH GM: 17 POWDER, FOR SOLUTION ORAL at 09:24

## 2017-11-25 RX ADMIN — DOXAZOSIN MESYLATE SCH MG: 2 TABLET ORAL at 09:25

## 2017-11-25 RX ADMIN — INSULIN GLARGINE SCH UNIT: 100 INJECTION, SOLUTION SUBCUTANEOUS at 22:06

## 2017-11-25 RX ADMIN — METOPROLOL TARTRATE SCH MG: 50 TABLET, FILM COATED ORAL at 21:27

## 2017-11-25 RX ADMIN — GABAPENTIN SCH MG: 100 CAPSULE ORAL at 09:25

## 2017-11-25 RX ADMIN — DONEPEZIL HYDROCHLORIDE SCH MG: 10 TABLET ORAL at 09:26

## 2017-11-25 RX ADMIN — NIFEDIPINE SCH MG: 60 TABLET, FILM COATED, EXTENDED RELEASE ORAL at 21:26

## 2017-11-25 RX ADMIN — GABAPENTIN SCH MG: 100 CAPSULE ORAL at 21:25

## 2017-11-25 RX ADMIN — NIFEDIPINE SCH MG: 60 TABLET, FILM COATED, EXTENDED RELEASE ORAL at 09:27

## 2017-11-25 RX ADMIN — DOXAZOSIN MESYLATE SCH MG: 2 TABLET ORAL at 21:26

## 2017-11-25 RX ADMIN — GABAPENTIN SCH MG: 100 CAPSULE ORAL at 13:45

## 2017-11-25 RX ADMIN — ENOXAPARIN SODIUM SCH MG: 100 INJECTION SUBCUTANEOUS at 09:41

## 2017-11-25 RX ADMIN — OXYCODONE HYDROCHLORIDE SCH MG: 10 TABLET, FILM COATED, EXTENDED RELEASE ORAL at 09:26

## 2017-11-25 RX ADMIN — LISINOPRIL SCH MG: 20 TABLET ORAL at 09:25

## 2017-11-25 RX ADMIN — ASPIRIN 81 MG SCH MG: 81 TABLET ORAL at 09:24

## 2017-11-25 RX ADMIN — ZOLPIDEM TARTRATE PRN MG: 5 TABLET, FILM COATED ORAL at 21:25

## 2017-11-25 NOTE — PN
Date/Time of Note


Date/Time of Note


DATE: 11/25/17 


TIME: 11:08





Assessment/Plan


VTE Prophylaxis


VTE Prophylaxis Intervention:  other





Lines/Catheters


IV Catheter Type (from [facility]):  Saline Lock


Urinary Cath still in place:  No





Assessment/Plan


Chief Complaint/Hosp Course


-Cervical radiculopathy with central canal stenosis, Dr. Escoto neurology 

consult is appreciated.  Dr. Pennington is following patient for pain management.


-Hypertensive urgency, continue hydralazine


-Elevated troponin. Dr. Eldridge is following in cardiology consultation.


-Diabetes mellitus, continue Lantus, pre-meal NovoLog and NovoLog per sliding 

scale.


-Obesity with BMI of 36.2


-History of gastritis, continue PPI


Problems:  





Subjective


24 Hr Interval Summary


Free Text/Dictation


Patient denies pain but does state that she feels weak





Exam/Review of Systems


Vital Signs


Vitals





 Vital Signs








  Date Time  Temp Pulse Resp B/P Pulse Ox O2 Delivery O2 Flow Rate FiO2


 


11/25/17 07:40 97.8 78 18 143/63 99   


 


11/25/17 01:34      Room Air  


 


11/22/17 20:00       2.0 














 Intake and Output   


 


 11/24/17 11/24/17 11/25/17





 15:00 23:00 07:00


 


Intake Total  650 ml 480 ml


 


Balance  650 ml 480 ml











Exam


Constitutional:  well developed


Head:  atraumatic, normocephalic


Neck:  supple


Respiratory:  clear to auscultation


Cardiovascular:  regular rate and rhythm


Gastrointestinal:  non-tender, soft


Extremities:  normal pulses





Results


Result Diagram:  


11/24/17 0641                                                                  

              11/25/17 0511





Results 24 hrs





Laboratory Tests








Test


  11/24/17


12:08 11/24/17


12:49 11/24/17


16:56 11/24/17


20:49


 


Bedside Glucose 178    175   169  


 


Lab Scanned Report  REFERENCE LAB    














Test


  11/25/17


05:11 11/25/17


08:30 11/25/17


09:34 


 


 


Sodium Level 138     


 


Potassium Level 4.2     


 


Chloride Level 100     


 


Carbon Dioxide Level 31     


 


Anion Gap 11     


 


Blood Urea Nitrogen 25  H   


 


Creatinine 1.00     


 


Glucose Level 102  #   


 


Calcium Level 10.4  H   


 


Bedside Glucose  136   198   











Medications


Medications





 Current Medications


Hydralazine HCl (Apresoline) 10 mg Q6H  PRN IV SBP>170 Last administered on 11/ 22/17at 17:48; Admin Dose 10 MG;  Start 11/16/17 at 15:30


Diagnostic Test (Pha) (Accu-Chek) 1 ea 02 XX ;  Start 11/17/17 at 02:00


Donepezil HCl (Aricept) 10 mg DAILY PO  Last administered on 11/25/17at 09:26; 

Admin Dose 10 MG;  Start 11/17/17 at 09:00


Lisinopril (Zestril) 40 mg DAILY PO  Last administered on 11/25/17at 09:25; 

Admin Dose 40 MG;  Start 11/17/17 at 09:00


Ranitidine HCl (Zantac) 300 mg HS PO  Last administered on 11/24/17at 20:52; 

Admin Dose 300 MG;  Start 11/16/17 at 21:00


Ondansetron HCl (Zofran Inj) 4 mg Q6H  PRN IV NAUSEA AND/OR VOMITING Last 

administered on 11/23/17at 11:07; Admin Dose 4 MG;  Start 11/16/17 at 16:00


Aspirin (Aspirin) 81 mg DAILY PO  Last administered on 11/25/17at 09:24; Admin 

Dose 81 MG;  Start 11/17/17 at 09:00


Acetaminophen (Tylenol Tab) 650 mg Q6H  PRN PO PAIN LEVEL 1-3 OR FEVER Last 

administered on 11/22/17at 20:54; Admin Dose 650 MG;  Start 11/16/17 at 16:00


Enoxaparin Sodium (Lovenox) 30 mg DAILY SC  Last administered on 11/25/17at 09:

41; Admin Dose 30 MG;  Start 11/17/17 at 09:00


Miscellaneous Information 1 ea NOTE XX ;  Start 11/16/17 at 16:00


Glucose (Glutose) 15 gm Q15M  PRN PO DECREASED GLUCOSE;  Start 11/16/17 at 16:00


Glucose (Glutose) 22.5 gm Q15M  PRN PO DECREASED GLUCOSE;  Start 11/16/17 at 16:

00


Dextrose (D50w Syringe) 25 ml Q15M  PRN IV DECREASED GLUCOSE;  Start 11/16/17 

at 16:00


Dextrose (D50w Syringe) 50 ml Q15M  PRN IV DECREASED GLUCOSE;  Start 11/16/17 

at 16:00


Glucagon (Glucagen) 1 mg Q15M  PRN IM DECREASED GLUCOSE;  Start 11/16/17 at 16:

00


Glucose (Glutose) 15 gm Q15M  PRN BUCCAL DECREASED GLUCOSE;  Start 11/16/17 at 

16:00


Clonidine (Catapres) 0.1 mg Q4H  PRN PO SBP>170 Last administered on 11/21/17at 

14:54; Admin Dose 0.1 MG;  Start 11/17/17 at 18:00


Oxycodone HCl (Oxycontin) 10 mg AM PO  Last administered on 11/25/17at 09:26; 

Admin Dose 10 MG;  Start 11/18/17 at 09:00


Polyethylene Glycol (Miralax) 17 gm DAILY PO  Last administered on 11/25/17at 09

:24; Admin Dose 17 GM;  Start 11/18/17 at 09:00


Alprazolam (Xanax) 0.5 mg Q8H  PRN PO ANXIETY Last administered on 11/23/17at 21

:15; Admin Dose 0.5 MG;  Start 11/18/17 at 11:30


Phenol (Cepastat Lozenge) 1 lozenge Q1H  PRN MT COUGH;  Start 11/18/17 at 15:30


Gabapentin (Neurontin) 200 mg TID GTB  Last administered on 11/25/17at 09:25; 

Admin Dose 200 MG;  Start 11/19/17 at 15:00


Hydralazine HCl (Apresoline) 100 mg Q8 PO  Last administered on 11/25/17at 05:12

; Admin Dose 100 MG;  Start 11/20/17 at 14:00


Zolpidem Tartrate (Ambien) 5 mg HS  PRN PO INSOMNIA Last administered on 11/24/ 17at 21:02; Admin Dose 5 MG;  Start 11/21/17 at 01:00


Nifedipine (Procardia Xl) 60 mg BID PO  Last administered on 11/25/17at 09:27; 

Admin Dose 60 MG;  Start 11/21/17 at 21:00


Metoprolol Tartrate (Lopressor) 50 mg BID PO  Last administered on 11/25/17at 09

:27; Admin Dose 50 MG;  Start 11/21/17 at 21:00


Doxazosin Mesylate (Cardura) 2 mg BID PO  Last administered on 11/25/17at 09:25

; Admin Dose 2 MG;  Start 11/22/17 at 21:00


Insulin Glargine (Lantus) 24 unit DAILY@20 SC  Last administered on 11/24/17at 

20:58; Admin Dose 24 UNIT;  Start 11/24/17 at 20:00











ALISTAIR ADEN Nov 25, 2017 11:09

## 2017-11-26 VITALS — RESPIRATION RATE: 18 BRPM | SYSTOLIC BLOOD PRESSURE: 145 MMHG | DIASTOLIC BLOOD PRESSURE: 65 MMHG

## 2017-11-26 VITALS — SYSTOLIC BLOOD PRESSURE: 133 MMHG | DIASTOLIC BLOOD PRESSURE: 61 MMHG | RESPIRATION RATE: 18 BRPM

## 2017-11-26 VITALS — DIASTOLIC BLOOD PRESSURE: 51 MMHG | SYSTOLIC BLOOD PRESSURE: 107 MMHG | RESPIRATION RATE: 20 BRPM

## 2017-11-26 VITALS — RESPIRATION RATE: 18 BRPM | DIASTOLIC BLOOD PRESSURE: 57 MMHG | SYSTOLIC BLOOD PRESSURE: 121 MMHG

## 2017-11-26 RX ADMIN — RANITIDINE SCH MG: 150 TABLET ORAL at 20:12

## 2017-11-26 RX ADMIN — ZOLPIDEM TARTRATE PRN MG: 5 TABLET, FILM COATED ORAL at 21:35

## 2017-11-26 RX ADMIN — GABAPENTIN SCH MG: 100 CAPSULE ORAL at 13:49

## 2017-11-26 RX ADMIN — DONEPEZIL HYDROCHLORIDE SCH MG: 10 TABLET ORAL at 08:26

## 2017-11-26 RX ADMIN — GABAPENTIN SCH MG: 100 CAPSULE ORAL at 08:26

## 2017-11-26 RX ADMIN — LISINOPRIL SCH MG: 20 TABLET ORAL at 08:26

## 2017-11-26 RX ADMIN — POLYETHYLENE GLYCOL 3350 SCH GM: 17 POWDER, FOR SOLUTION ORAL at 08:25

## 2017-11-26 RX ADMIN — METOPROLOL TARTRATE SCH MG: 50 TABLET, FILM COATED ORAL at 20:12

## 2017-11-26 RX ADMIN — ASPIRIN 81 MG SCH MG: 81 TABLET ORAL at 08:24

## 2017-11-26 RX ADMIN — ENOXAPARIN SODIUM SCH MG: 100 INJECTION SUBCUTANEOUS at 08:30

## 2017-11-26 RX ADMIN — DOXAZOSIN MESYLATE SCH MG: 2 TABLET ORAL at 20:11

## 2017-11-26 RX ADMIN — OXYCODONE HYDROCHLORIDE SCH MG: 10 TABLET, FILM COATED, EXTENDED RELEASE ORAL at 08:33

## 2017-11-26 RX ADMIN — METOPROLOL TARTRATE SCH MG: 50 TABLET, FILM COATED ORAL at 08:27

## 2017-11-26 RX ADMIN — INSULIN GLARGINE SCH UNIT: 100 INJECTION, SOLUTION SUBCUTANEOUS at 20:11

## 2017-11-26 RX ADMIN — NIFEDIPINE SCH MG: 60 TABLET, FILM COATED, EXTENDED RELEASE ORAL at 20:12

## 2017-11-26 RX ADMIN — NIFEDIPINE SCH MG: 60 TABLET, FILM COATED, EXTENDED RELEASE ORAL at 08:25

## 2017-11-26 RX ADMIN — GABAPENTIN SCH MG: 100 CAPSULE ORAL at 20:12

## 2017-11-26 RX ADMIN — DOXAZOSIN MESYLATE SCH MG: 2 TABLET ORAL at 08:27

## 2017-11-26 NOTE — PN
Date/Time of Note


Date/Time of Note


DATE: 11/26/17 


TIME: 10:48





Assessment/Plan


VTE Prophylaxis


VTE Prophylaxis Intervention:  other





Lines/Catheters


IV Catheter Type (from [facility]):  Saline Lock


Urinary Cath still in place:  No





Assessment/Plan


Chief Complaint/Hosp Course


-Cervical radiculopathy with central canal stenosis, Dr. Escoto neurology 

consult is appreciated.  Dr. Pennington is following patient for pain management.


-Hypertensive urgency, continue hydralazine


-Elevated troponin. Dr. Eldridge is following in cardiology consultation.


-Diabetes mellitus, continue Lantus, pre-meal NovoLog and NovoLog per sliding 

scale.


-Obesity with BMI of 36.2


-History of gastritis, continue PPI


Problems:  





Subjective


24 Hr Interval Summary


Free Text/Dictation


Patient has no complaints, able to ambulate with walker





Exam/Review of Systems


Vital Signs


Vitals





 Vital Signs








  Date Time  Temp Pulse Resp B/P Pulse Ox O2 Delivery O2 Flow Rate FiO2


 


11/26/17 07:37 98.2 76 18 121/57 98   


 


11/25/17 01:34      Room Air  


 


11/22/17 20:00       2.0 














 Intake and Output   


 


 11/25/17 11/25/17 11/26/17





 14:59 22:59 06:59


 


Intake Total  960 ml 240 ml


 


Output Total  200 ml 800 ml


 


Balance  760 ml -560 ml











Exam


Constitutional:  well developed


Head:  atraumatic, normocephalic


Neck:  supple


Respiratory:  clear to auscultation


Cardiovascular:  regular rate and rhythm


Gastrointestinal:  non-tender, soft


Extremities:  normal pulses





Results


Result Diagram:  


11/24/17 0641                                                                  

              11/25/17 0511





Results 24 hrs





Laboratory Tests








Test


  11/25/17


12:22 11/25/17


17:25 11/25/17


21:22 11/26/17


08:12


 


Bedside Glucose 128   160   142   120  











Medications


Medications





 Current Medications


Hydralazine HCl (Apresoline) 10 mg Q6H  PRN IV SBP>170 Last administered on 11/ 22/17at 17:48; Admin Dose 10 MG;  Start 11/16/17 at 15:30


Diagnostic Test (Pha) (Accu-Chek) 1 ea 02 XX ;  Start 11/17/17 at 02:00


Donepezil HCl (Aricept) 10 mg DAILY PO  Last administered on 11/26/17at 08:26; 

Admin Dose 10 MG;  Start 11/17/17 at 09:00


Lisinopril (Zestril) 40 mg DAILY PO  Last administered on 11/26/17at 08:26; 

Admin Dose 40 MG;  Start 11/17/17 at 09:00


Ranitidine HCl (Zantac) 300 mg HS PO  Last administered on 11/25/17at 21:25; 

Admin Dose 300 MG;  Start 11/16/17 at 21:00


Ondansetron HCl (Zofran Inj) 4 mg Q6H  PRN IV NAUSEA AND/OR VOMITING Last 

administered on 11/25/17at 21:25; Admin Dose 4 MG;  Start 11/16/17 at 16:00


Aspirin (Aspirin) 81 mg DAILY PO  Last administered on 11/26/17at 08:24; Admin 

Dose 81 MG;  Start 11/17/17 at 09:00


Acetaminophen (Tylenol Tab) 650 mg Q6H  PRN PO PAIN LEVEL 1-3 OR FEVER Last 

administered on 11/22/17at 20:54; Admin Dose 650 MG;  Start 11/16/17 at 16:00


Enoxaparin Sodium (Lovenox) 30 mg DAILY SC  Last administered on 11/26/17at 08:

30; Admin Dose 30 MG;  Start 11/17/17 at 09:00


Miscellaneous Information 1 ea NOTE XX ;  Start 11/16/17 at 16:00


Glucose (Glutose) 15 gm Q15M  PRN PO DECREASED GLUCOSE;  Start 11/16/17 at 16:00


Glucose (Glutose) 22.5 gm Q15M  PRN PO DECREASED GLUCOSE;  Start 11/16/17 at 16:

00


Dextrose (D50w Syringe) 25 ml Q15M  PRN IV DECREASED GLUCOSE;  Start 11/16/17 

at 16:00


Dextrose (D50w Syringe) 50 ml Q15M  PRN IV DECREASED GLUCOSE;  Start 11/16/17 

at 16:00


Glucagon (Glucagen) 1 mg Q15M  PRN IM DECREASED GLUCOSE;  Start 11/16/17 at 16:

00


Glucose (Glutose) 15 gm Q15M  PRN BUCCAL DECREASED GLUCOSE;  Start 11/16/17 at 

16:00


Clonidine (Catapres) 0.1 mg Q4H  PRN PO SBP>170 Last administered on 11/21/17at 

14:54; Admin Dose 0.1 MG;  Start 11/17/17 at 18:00


Oxycodone HCl (Oxycontin) 10 mg AM PO  Last administered on 11/26/17at 08:33; 

Admin Dose 10 MG;  Start 11/18/17 at 09:00


Polyethylene Glycol (Miralax) 17 gm DAILY PO  Last administered on 11/26/17at 08

:25; Admin Dose 17 GM;  Start 11/18/17 at 09:00


Alprazolam (Xanax) 0.5 mg Q8H  PRN PO ANXIETY Last administered on 11/23/17at 21

:15; Admin Dose 0.5 MG;  Start 11/18/17 at 11:30


Phenol (Cepastat Lozenge) 1 lozenge Q1H  PRN MT COUGH;  Start 11/18/17 at 15:30


Gabapentin (Neurontin) 200 mg TID GTB  Last administered on 11/26/17at 08:26; 

Admin Dose 200 MG;  Start 11/19/17 at 15:00


Hydralazine HCl (Apresoline) 100 mg Q8 PO  Last administered on 11/26/17at 05:30

; Admin Dose 100 MG;  Start 11/20/17 at 14:00


Zolpidem Tartrate (Ambien) 5 mg HS  PRN PO INSOMNIA Last administered on 11/25/ 17at 21:25; Admin Dose 5 MG;  Start 11/21/17 at 01:00


Nifedipine (Procardia Xl) 60 mg BID PO  Last administered on 11/26/17at 08:25; 

Admin Dose 60 MG;  Start 11/21/17 at 21:00


Metoprolol Tartrate (Lopressor) 50 mg BID PO  Last administered on 11/26/17at 08

:27; Admin Dose 50 MG;  Start 11/21/17 at 21:00


Doxazosin Mesylate (Cardura) 2 mg BID PO  Last administered on 11/26/17at 08:27

; Admin Dose 2 MG;  Start 11/22/17 at 21:00


Insulin Glargine (Lantus) 24 unit DAILY@20 SC  Last administered on 11/25/17at 

22:06; Admin Dose 24 UNIT;  Start 11/24/17 at 20:00











ALISTAIR ADEN Nov 26, 2017 10:49

## 2017-11-27 VITALS — RESPIRATION RATE: 18 BRPM | DIASTOLIC BLOOD PRESSURE: 58 MMHG | SYSTOLIC BLOOD PRESSURE: 122 MMHG

## 2017-11-27 VITALS — SYSTOLIC BLOOD PRESSURE: 150 MMHG | DIASTOLIC BLOOD PRESSURE: 65 MMHG | RESPIRATION RATE: 16 BRPM

## 2017-11-27 VITALS — DIASTOLIC BLOOD PRESSURE: 56 MMHG | SYSTOLIC BLOOD PRESSURE: 119 MMHG | RESPIRATION RATE: 18 BRPM

## 2017-11-27 VITALS — SYSTOLIC BLOOD PRESSURE: 147 MMHG | RESPIRATION RATE: 17 BRPM | DIASTOLIC BLOOD PRESSURE: 64 MMHG

## 2017-11-27 LAB
ANION GAP SERPL CALC-SCNC: 13 MMOL/L (ref 8–16)
BASOPHILS # BLD AUTO: 0 10^3/UL (ref 0–0.1)
BASOPHILS NFR BLD: 0.5 % (ref 0–2)
BUN SERPL-MCNC: 23 MG/DL (ref 7–20)
CALCIUM SERPL-MCNC: 10.1 MG/DL (ref 8.4–10.2)
CHLORIDE SERPL-SCNC: 100 MMOL/L (ref 97–110)
CO2 SERPL-SCNC: 29 MMOL/L (ref 21–31)
CREAT SERPL-MCNC: 1.03 MG/DL (ref 0.44–1)
EOSINOPHIL # BLD: 0.2 10^3/UL (ref 0–0.5)
EOSINOPHIL NFR BLD: 2.3 % (ref 0–7)
ERYTHROCYTE [DISTWIDTH] IN BLOOD BY AUTOMATED COUNT: 14.9 % (ref 11.5–14.5)
GLUCOSE SERPL-MCNC: 124 MG/DL (ref 70–220)
HCT VFR BLD CALC: 34 % (ref 37–47)
HGB BLD-MCNC: 11.2 G/DL (ref 12–16)
LYMPHOCYTES # BLD AUTO: 1.9 10^3/UL (ref 0.8–2.9)
LYMPHOCYTES NFR BLD AUTO: 30 % (ref 15–51)
MCH RBC QN AUTO: 30 PG (ref 29–33)
MCHC RBC AUTO-ENTMCNC: 32.9 G/DL (ref 32–37)
MCV RBC AUTO: 91.2 FL (ref 82–101)
MONOCYTES # BLD: 0.4 10^3/UL (ref 0.3–0.9)
MONOCYTES NFR BLD: 5.8 % (ref 0–11)
NEUTROPHILS # BLD: 3.9 10^3/UL (ref 1.6–7.5)
NEUTROPHILS NFR BLD AUTO: 61.2 % (ref 39–77)
NRBC # BLD MANUAL: 0 10^3/UL (ref 0–0)
NRBC BLD AUTO-RTO: 0 /100WBC (ref 0–0)
PLATELET # BLD: 226 10^3/UL (ref 140–415)
PMV BLD AUTO: 10.5 FL (ref 7.4–10.4)
POTASSIUM SERPL-SCNC: 4.5 MMOL/L (ref 3.5–5.1)
RBC # BLD AUTO: 3.73 10^6/UL (ref 4.2–5.4)
SODIUM SERPL-SCNC: 137 MMOL/L (ref 135–144)
WBC # BLD AUTO: 6.4 10^3/UL (ref 4.8–10.8)

## 2017-11-27 RX ADMIN — GABAPENTIN SCH MG: 100 CAPSULE ORAL at 12:16

## 2017-11-27 RX ADMIN — RANITIDINE SCH MG: 150 TABLET ORAL at 20:57

## 2017-11-27 RX ADMIN — OXYCODONE HYDROCHLORIDE SCH MG: 10 TABLET, FILM COATED, EXTENDED RELEASE ORAL at 08:22

## 2017-11-27 RX ADMIN — ENOXAPARIN SODIUM SCH MG: 100 INJECTION SUBCUTANEOUS at 08:26

## 2017-11-27 RX ADMIN — METOPROLOL TARTRATE SCH MG: 50 TABLET, FILM COATED ORAL at 08:21

## 2017-11-27 RX ADMIN — DONEPEZIL HYDROCHLORIDE SCH MG: 10 TABLET ORAL at 08:21

## 2017-11-27 RX ADMIN — METOPROLOL TARTRATE SCH MG: 50 TABLET, FILM COATED ORAL at 20:59

## 2017-11-27 RX ADMIN — ZOLPIDEM TARTRATE PRN MG: 5 TABLET, FILM COATED ORAL at 22:00

## 2017-11-27 RX ADMIN — DOXAZOSIN MESYLATE SCH MG: 2 TABLET ORAL at 20:58

## 2017-11-27 RX ADMIN — INSULIN GLARGINE SCH UNIT: 100 INJECTION, SOLUTION SUBCUTANEOUS at 21:11

## 2017-11-27 RX ADMIN — NIFEDIPINE SCH MG: 60 TABLET, FILM COATED, EXTENDED RELEASE ORAL at 20:58

## 2017-11-27 RX ADMIN — LISINOPRIL SCH MG: 20 TABLET ORAL at 08:21

## 2017-11-27 RX ADMIN — GABAPENTIN SCH MG: 100 CAPSULE ORAL at 20:56

## 2017-11-27 RX ADMIN — DOXAZOSIN MESYLATE SCH MG: 2 TABLET ORAL at 08:21

## 2017-11-27 RX ADMIN — ASPIRIN 81 MG SCH MG: 81 TABLET ORAL at 08:20

## 2017-11-27 RX ADMIN — GABAPENTIN SCH MG: 100 CAPSULE ORAL at 08:20

## 2017-11-27 RX ADMIN — POLYETHYLENE GLYCOL 3350 SCH GM: 17 POWDER, FOR SOLUTION ORAL at 08:19

## 2017-11-27 RX ADMIN — NIFEDIPINE SCH MG: 60 TABLET, FILM COATED, EXTENDED RELEASE ORAL at 08:20

## 2017-11-27 NOTE — PN
Date/Time of Note


Date/Time of Note


DATE: 11/27/17 


TIME: 13:17





Assessment/Plan


VTE Prophylaxis


VTE Prophylaxis Intervention:  SCD's





Lines/Catheters


IV Catheter Type (from Nrsg):  Saline Lock


Urinary Cath still in place:  No





Assessment/Plan


Chief Complaint/Hosp Course


DC oxycodone, continue Norco as needed for pain.  If pain is well controlled on 

[place] patient can be discharged home tomorrow.  Patient will benefit from acute 

rehab however family refused ARU, anticipate discharge home with home health 

services.


Assessment/Plan


-Cervical radiculopathy with central canal stenosis, Dr. Escoto neurology 

consult is appreciated.  Dr. Pennington is following patient for pain management.


-Hypertensive urgency, continue hydralazine


-Elevated troponin. Dr. Eldridge is following in cardiology consultation.


-Diabetes mellitus, continue Lantus, pre-meal NovoLog and NovoLog per sliding 

scale.


-Obesity with BMI of 36.2


-History of gastritis, continue PPI





Further recommendations based on clinical course.  Plan of care discussed with 

Dr. Maza


Problems:  





Exam/Review of Systems


Vital Signs


Vitals





 Vital Signs








  Date Time  Temp Pulse Resp B/P Pulse Ox O2 Delivery O2 Flow Rate FiO2


 


11/27/17 08:00 98.2 73 16 150/65 96   


 


11/25/17 01:34      Room Air  














 Intake and Output   


 


 11/26/17 11/26/17 11/27/17





 15:00 23:00 07:00


 


Intake Total  1040 ml 240 ml


 


Output Total  600 ml 300 ml


 


Balance  440 ml -60 ml











Exam


Constitutional:  alert, oriented


Neck:  supple


Respiratory:  normal air movement


Cardiovascular:  nl pulses


Gastrointestinal:  non-tender, soft


Musculoskeletal:  nl extremities to inspection


Neurological:  other (Left upper extremity weakness)





Results


Result Diagram:  


11/27/17 0541                                                                  

              11/27/17 0541





Results 24 hrs





Laboratory Tests








Test


  11/26/17


17:43 11/26/17


20:07 11/27/17


05:41 11/27/17


08:03


 


Bedside Glucose 144   146    138  


 


White Blood Count   6.4   


 


Red Blood Count   3.73  L 


 


Hemoglobin   11.2  L 


 


Hematocrit   34.0  L 


 


Mean Corpuscular Volume   91.2   


 


Mean Corpuscular Hemoglobin   30.0   


 


Mean Corpuscular Hemoglobin


Concent 


  


  32.9  


  


 


 


Red Cell Distribution Width   14.9  H 


 


Platelet Count   226   


 


Mean Platelet Volume   10.5  H 


 


Neutrophils %   61.2   


 


Lymphocytes %   30.0   


 


Monocytes %   5.8   


 


Eosinophils %   2.3   


 


Basophils %   0.5   


 


Nucleated Red Blood Cells %   0.0   


 


Neutrophils #   3.9   


 


Lymphocytes #   1.9   


 


Monocytes #   0.4   


 


Eosinophils #   0.2   


 


Basophils #   0.0   


 


Nucleated Red Blood Cells #   0.0   


 


Sodium Level   137   


 


Potassium Level   4.5   


 


Chloride Level   100   


 


Carbon Dioxide Level   29   


 


Anion Gap   13   


 


Blood Urea Nitrogen   23  H 


 


Creatinine   1.03  H 


 


Glucose Level   124   


 


Calcium Level   10.1   














Test


  11/27/17


12:15 


  


  


 


 


Bedside Glucose 160     











Medications


Medications





 Current Medications


Hydralazine HCl (Apresoline) 10 mg Q6H  PRN IV SBP>170 Last administered on 11/ 22/17at 17:48; Admin Dose 10 MG;  Start 11/16/17 at 15:30


Diagnostic Test (Pha) (Accu-Chek) 1 ea 02 XX ;  Start 11/17/17 at 02:00


Donepezil HCl (Aricept) 10 mg DAILY PO  Last administered on 11/27/17at 08:21; 

Admin Dose 10 MG;  Start 11/17/17 at 09:00


Lisinopril (Zestril) 40 mg DAILY PO  Last administered on 11/27/17at 08:21; 

Admin Dose 40 MG;  Start 11/17/17 at 09:00


Ranitidine HCl (Zantac) 300 mg HS PO  Last administered on 11/26/17at 20:12; 

Admin Dose 300 MG;  Start 11/16/17 at 21:00


Ondansetron HCl (Zofran Inj) 4 mg Q6H  PRN IV NAUSEA AND/OR VOMITING Last 

administered on 11/25/17at 21:25; Admin Dose 4 MG;  Start 11/16/17 at 16:00


Aspirin (Aspirin) 81 mg DAILY PO  Last administered on 11/27/17at 08:20; Admin 

Dose 81 MG;  Start 11/17/17 at 09:00


Acetaminophen (Tylenol Tab) 650 mg Q6H  PRN PO PAIN LEVEL 1-3 OR FEVER Last 

administered on 11/22/17at 20:54; Admin Dose 650 MG;  Start 11/16/17 at 16:00


Enoxaparin Sodium (Lovenox) 30 mg DAILY SC  Last administered on 11/27/17at 08:

26; Admin Dose 30 MG;  Start 11/17/17 at 09:00


Miscellaneous Information 1 ea NOTE XX ;  Start 11/16/17 at 16:00


Glucose (Glutose) 15 gm Q15M  PRN PO DECREASED GLUCOSE;  Start 11/16/17 at 16:00


Glucose (Glutose) 22.5 gm Q15M  PRN PO DECREASED GLUCOSE;  Start 11/16/17 at 16:

00


Dextrose (D50w Syringe) 25 ml Q15M  PRN IV DECREASED GLUCOSE;  Start 11/16/17 

at 16:00


Dextrose (D50w Syringe) 50 ml Q15M  PRN IV DECREASED GLUCOSE;  Start 11/16/17 

at 16:00


Glucagon (Glucagen) 1 mg Q15M  PRN IM DECREASED GLUCOSE;  Start 11/16/17 at 16:

00


Glucose (Glutose) 15 gm Q15M  PRN BUCCAL DECREASED GLUCOSE;  Start 11/16/17 at 

16:00


Clonidine (Catapres) 0.1 mg Q4H  PRN PO SBP>170 Last administered on 11/21/17at 

14:54; Admin Dose 0.1 MG;  Start 11/17/17 at 18:00


Oxycodone HCl (Oxycontin) 10 mg AM PO  Last administered on 11/26/17at 08:33; 

Admin Dose 10 MG;  Start 11/18/17 at 09:00


Polyethylene Glycol (Miralax) 17 gm DAILY PO  Last administered on 11/27/17at 08

:19; Admin Dose 17 GM;  Start 11/18/17 at 09:00


Alprazolam (Xanax) 0.5 mg Q8H  PRN PO ANXIETY Last administered on 11/23/17at 21

:15; Admin Dose 0.5 MG;  Start 11/18/17 at 11:30


Phenol (Cepastat Lozenge) 1 lozenge Q1H  PRN MT COUGH;  Start 11/18/17 at 15:30


Gabapentin (Neurontin) 200 mg TID GTB  Last administered on 11/27/17at 12:16; 

Admin Dose 200 MG;  Start 11/19/17 at 15:00


Hydralazine HCl (Apresoline) 100 mg Q8 PO  Last administered on 11/27/17at 06:01

; Admin Dose 100 MG;  Start 11/20/17 at 14:00


Zolpidem Tartrate (Ambien) 5 mg HS  PRN PO INSOMNIA Last administered on 11/26/ 17at 21:35; Admin Dose 5 MG;  Start 11/21/17 at 01:00


Nifedipine (Procardia Xl) 60 mg BID PO  Last administered on 11/27/17at 08:20; 

Admin Dose 60 MG;  Start 11/21/17 at 21:00


Metoprolol Tartrate (Lopressor) 50 mg BID PO  Last administered on 11/27/17at 08

:21; Admin Dose 50 MG;  Start 11/21/17 at 21:00


Doxazosin Mesylate (Cardura) 2 mg BID PO  Last administered on 11/27/17at 08:21

; Admin Dose 2 MG;  Start 11/22/17 at 21:00


Insulin Glargine (Lantus) 24 unit DAILY@20 SC  Last administered on 11/26/17at 

20:11; Admin Dose 24 UNIT;  Start 11/24/17 at 20:00











ALIRIO MC Nov 27, 2017 13:18

## 2017-11-28 VITALS — SYSTOLIC BLOOD PRESSURE: 133 MMHG | DIASTOLIC BLOOD PRESSURE: 60 MMHG | RESPIRATION RATE: 17 BRPM

## 2017-11-28 VITALS — SYSTOLIC BLOOD PRESSURE: 194 MMHG | RESPIRATION RATE: 20 BRPM | DIASTOLIC BLOOD PRESSURE: 78 MMHG

## 2017-11-28 VITALS — DIASTOLIC BLOOD PRESSURE: 56 MMHG | SYSTOLIC BLOOD PRESSURE: 120 MMHG | RESPIRATION RATE: 18 BRPM

## 2017-11-28 VITALS — RESPIRATION RATE: 18 BRPM | SYSTOLIC BLOOD PRESSURE: 135 MMHG | DIASTOLIC BLOOD PRESSURE: 58 MMHG

## 2017-11-28 RX ADMIN — POLYETHYLENE GLYCOL 3350 SCH GM: 17 POWDER, FOR SOLUTION ORAL at 08:09

## 2017-11-28 RX ADMIN — HYDROCODONE BITARTRATE AND ACETAMINOPHEN PRN TAB: 5; 325 TABLET ORAL at 13:22

## 2017-11-28 RX ADMIN — DONEPEZIL HYDROCHLORIDE SCH MG: 10 TABLET ORAL at 08:08

## 2017-11-28 RX ADMIN — METOPROLOL TARTRATE SCH MG: 50 TABLET, FILM COATED ORAL at 08:08

## 2017-11-28 RX ADMIN — ENOXAPARIN SODIUM SCH MG: 100 INJECTION SUBCUTANEOUS at 08:15

## 2017-11-28 RX ADMIN — ASPIRIN 81 MG SCH MG: 81 TABLET ORAL at 08:09

## 2017-11-28 RX ADMIN — HYDROCODONE BITARTRATE AND ACETAMINOPHEN PRN TAB: 5; 325 TABLET ORAL at 09:29

## 2017-11-28 RX ADMIN — ZOLPIDEM TARTRATE PRN MG: 5 TABLET, FILM COATED ORAL at 21:16

## 2017-11-28 RX ADMIN — LISINOPRIL SCH MG: 20 TABLET ORAL at 09:00

## 2017-11-28 RX ADMIN — DOXAZOSIN MESYLATE SCH MG: 2 TABLET ORAL at 20:21

## 2017-11-28 RX ADMIN — RANITIDINE SCH MG: 150 TABLET ORAL at 20:22

## 2017-11-28 RX ADMIN — NIFEDIPINE SCH MG: 60 TABLET, FILM COATED, EXTENDED RELEASE ORAL at 20:22

## 2017-11-28 RX ADMIN — NIFEDIPINE SCH MG: 60 TABLET, FILM COATED, EXTENDED RELEASE ORAL at 08:09

## 2017-11-28 RX ADMIN — DOXAZOSIN MESYLATE SCH MG: 2 TABLET ORAL at 08:08

## 2017-11-28 RX ADMIN — GABAPENTIN SCH MG: 100 CAPSULE ORAL at 20:22

## 2017-11-28 RX ADMIN — GABAPENTIN SCH MG: 100 CAPSULE ORAL at 08:08

## 2017-11-28 RX ADMIN — GABAPENTIN SCH MG: 100 CAPSULE ORAL at 13:22

## 2017-11-28 RX ADMIN — METOPROLOL TARTRATE SCH MG: 50 TABLET, FILM COATED ORAL at 20:21

## 2017-11-28 RX ADMIN — INSULIN GLARGINE SCH UNIT: 100 INJECTION, SOLUTION SUBCUTANEOUS at 22:09

## 2017-11-28 NOTE — DS
Date/Time of Note


Date/Time of Note


DATE: 11/28/17 


TIME: 15:43





Discharge Summary


Admission/Discharge Info


Admit Date/Time


Nov 16, 2017 at 10:20


Discharge Date/Time





Patient Condition:  Stable


Hx of Present Illness


The patient is 77-year-old  female with history of hypertension 

diabetes obesity, gastritis.  The patient is status post EGD by Dr. Hitchcock in 

August 2017 was notion of gastritis and stomach polyps.  Patient is presented 

with complaints of severe neck pain and left arm weakness.  Most of the history 

was obtained from patient's daughter at the bedside who lives with patient.  

Patient's follow with Dr. Lopez primary doctor and was diagnosed with 

hypothyroidism.  Patient also underwent a steroid injection in the left 

shoulder and left knee by Dr. duke ruiz.  Patient also complains of insomnia 

for which she takes Xanax and Ambien as needed.  Patient denies any fever 

chills denies nausea vomiting, denies diarrhea. Patient underwent CT of the 

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 

frontal periventricular white matter/caudate head, and small old infarct in the 

inferior right cerebellum, no acute infarct.  Patient was diagnosed with 

possible acute stroke however patient is  outside the window for TPA.   

Troponin was negative on admission.  Patient also noted to have blood pressure 

elevated to 237 over 102 on admission.  Patient will be admitted for further 

evaluation and management.


Hospital Course


Patient and patient's family refused acute rehab, she will be discharged home 

with home health services


-Cervical radiculopathy with central canal stenosis, Dr. Escoto neurology 

consult is appreciated.  Dr. Pennington is following patient for pain management.

  She stated that her pain is currently under control


-Hypertensive urgency, continue hydralazine, Toprol and Procardia


-Elevated troponin. Dr. Eldridge is following in cardiology consultation.


-Diabetes mellitus, continue Lantus, pre-meal NovoLog and NovoLog per sliding 

scale.


-Obesity with BMI of 36.2, weight loss advised


-History of gastritis, continue PPI


Home Meds


Active Scripts


Zolpidem Tartrate (Ambien Malik) 5 Mg Tablet, 5 MG PO HS Y for INSOMNIA for 30 

Days, #30 TAB


   Prov:ALIRIO MC         11/28/17


Nifedipine (Afeditab CR) 60 Mg Tablet.sa, 60 MG PO BID for 30 Days


   Prov:RADCHENKO,SVETLANA 11/28/17


Metoprolol Tartrate* (Lopressor*) 50 Mg Tab, 50 MG PO BID for 30 Days, TAB


   Prov:RADCHENKO,SVETLANA 11/28/17


Lisinopril* (Lisinopril*) 20 Mg Tablet, 40 MG PO DAILY for 30 Days, TAB


   Prov:RADCHENKO,SVETLANA 11/28/17


Insulin Glargine* (Lantus*) 100 Unit/Ml Soln, 24 UNIT SC DAILY@20 for 30 Days


   Prov:RADCHENKO,SVETLANA 11/28/17


Insulin Aspart* (Novolog Insulin Pen*) 100 Unit/Ml Soln, 9 UNIT SC WITH MEALS 

for 30 Days


   Prov:RADCHENKO,SVETLANA 11/28/17


Hydrocodone Bit-Acetaminophen (Hydrocodone Bit-APAP) 5-325MG Tablet, 1 TAB PO 

Q4H Y for PAIN, #30 TAB


   Prov:RADCHENKO,SVETLANA 11/28/17


Hydralazine Hcl* (Hydralazine Hcl*) 25 Mg Tab, 100 MG PO Q8 for 30 Days, TAB


   Prov:RADCHENKO,SVETLANA 11/28/17


Gabapentin* (Gabapentin*) 100 Mg Capsule, 200 MG GTB TID for 30 Days, CAP


   Prov:RADCHENKO,SVETLANA 11/28/17


Doxazosin Mesylate* (Cardura*) 2 Mg Tablet, 2 MG PO BID for 30 Days, TAB


   Prov:RADCHENKO,SVETLANA 11/28/17


Aspirin (Aspirin) 81 Mg Chew, 81 MG PO DAILY for 30 Days, TAB


   Prov:[facility]ETLANA 11/28/17


Reported Medications


Gabapentin* (Gabapentin*) 300 Mg Capsule, 600 MG PO QHS, #180 CAP


   11/16/17


Gabapentin* (Gabapentin*) 300 Mg Capsule, 300 MG PO QAM, #60 CAP


   11/16/17


Tramadol Hcl* (Ultram*) 50 Mg Tablet, 50 MG PO Q6H Y for PAIN, TAB


   11/16/17


Hydrocodone/Acetaminophen (Norco  Tablet) 1 Each Tablet, 1 EACH PO DAILY 

Y for SEVERE PAIN LEVEL 7-10, TAB


   11/16/17


Donepezil* (Donepezil*) 10 Mg Tablet, 10 MG PO DAILY, #30 TAB


   11/16/17


Lisinopril* (Lisinopril*) 40 Mg Tablet, 40 MG PO DAILY, #30 TAB


   11/16/17


Dexlansoprazole (Dexilant) 60 Mg Cap., 60 MG PO DAILY, #30 CAP


   11/16/17


Ranitidine Hcl* (Zantac*) 300 Mg Tablet, 300 MG PO HS, #30 TAB


   11/16/17


Alprazolam* (Alprazolam*) 0.5 Mg Tablet, 0.5 MG PO QHS Y for SLEEP, TAB


   7/23/17


Insulin Glargine* (Lantus*) 100 Unit/Ml Soln, 35 UNIT SC QPM, #1 VIAL


   7/23/17


Insulin Glargine* (Lantus*) 100 Unit/Ml Soln, 40 UNIT SC QAM, #1 VIAL


   7/23/17


Ca Carbonate/Vitamin D3/Vit K (VIACTIV SOFT CHEW TABLET) 1 Each Tab.chew, 1 

EACH PO WITH MEALS, TAB.CHEW


   9/21/14


Cholecalciferol (Vitamin D3) (VITAMIN D-3) 2,000 Unit Capsule, 2000 UNIT PO 

DAILY


   9/21/14


Follow-up Plan


Follow-up with PMD in 2 weeks


Primary Care Provider


Prasad Lomeil MD


Time spent on discharge:   > 30 minutes


Pending Labs





Laboratory Tests








Test


  11/27/17


17:35 11/27/17


20:50 11/28/17


07:58 11/28/17


12:02


 


Bedside Glucose


  124mg/dL


() 126mg/dL


() 149mg/dL


() 150mg/dL


()

















ALIRIO MC Nov 28, 2017 15:45

## 2017-11-29 VITALS — RESPIRATION RATE: 18 BRPM | SYSTOLIC BLOOD PRESSURE: 143 MMHG | DIASTOLIC BLOOD PRESSURE: 66 MMHG

## 2017-11-29 VITALS — SYSTOLIC BLOOD PRESSURE: 122 MMHG | DIASTOLIC BLOOD PRESSURE: 59 MMHG | HEART RATE: 69 BPM

## 2017-11-29 VITALS — RESPIRATION RATE: 19 BRPM | DIASTOLIC BLOOD PRESSURE: 65 MMHG | SYSTOLIC BLOOD PRESSURE: 150 MMHG

## 2017-11-29 RX ADMIN — DONEPEZIL HYDROCHLORIDE SCH MG: 10 TABLET ORAL at 08:38

## 2017-11-29 RX ADMIN — ASPIRIN 81 MG SCH MG: 81 TABLET ORAL at 08:37

## 2017-11-29 RX ADMIN — METOPROLOL TARTRATE SCH MG: 50 TABLET, FILM COATED ORAL at 08:38

## 2017-11-29 RX ADMIN — GABAPENTIN SCH MG: 100 CAPSULE ORAL at 12:16

## 2017-11-29 RX ADMIN — ENOXAPARIN SODIUM SCH MG: 100 INJECTION SUBCUTANEOUS at 08:39

## 2017-11-29 RX ADMIN — POLYETHYLENE GLYCOL 3350 SCH GM: 17 POWDER, FOR SOLUTION ORAL at 08:39

## 2017-11-29 RX ADMIN — LISINOPRIL SCH MG: 20 TABLET ORAL at 08:37

## 2017-11-29 RX ADMIN — GABAPENTIN SCH MG: 100 CAPSULE ORAL at 08:37

## 2017-11-29 RX ADMIN — NIFEDIPINE SCH MG: 60 TABLET, FILM COATED, EXTENDED RELEASE ORAL at 08:38

## 2017-11-29 RX ADMIN — DOXAZOSIN MESYLATE SCH MG: 2 TABLET ORAL at 08:38

## 2017-11-29 NOTE — PN
Date/Time of Note


Date/Time of Note


DATE: 11/29/17 


TIME: 13:08





Assessment/Plan


VTE Prophylaxis


VTE Prophylaxis Intervention:  SCD's





Lines/Catheters


IV Catheter Type (from [facility]):  Saline Lock


Urinary Cath still in place:  No





Assessment/Plan


Chief Complaint/Hosp Course


D/ c was held yesterday due to elevated BP and anxiety, today BP is stable, pt 

denies any chest pain, denies SOB, d/w pt's daughter at the bedside, d/c 

planning.


Assessment/Plan


-Cervical radiculopathy with central canal stenosis, Dr. Escoto neurology 

consult is appreciated.  Dr. Pennington is following patient for pain management.


-Hypertensive urgency, continue hydralazine


-Elevated troponin. Dr. Eldridge is following in cardiology consultation.


-Diabetes mellitus, continue Lantus, pre-meal NovoLog and NovoLog per sliding 

scale.


-Obesity with BMI of 36.2


-History of gastritis, continue PPI





Further recommendations based on clinical course.  Plan of care discussed with 

Dr. Maza


Problems:  





Exam/Review of Systems


Vital Signs


Vitals





 Vital Signs








  Date Time  Temp Pulse Resp B/P Pulse Ox O2 Delivery O2 Flow Rate FiO2


 


11/29/17 12:30  69  122/59    


 


11/29/17 08:06 98.7  19  99   














 Intake and Output   


 


 11/28/17 11/28/17 11/29/17





 15:00 23:00 07:00


 


Intake Total  2090 ml 480 ml


 


Output Total  900 ml 1000 ml


 


Balance  1190 ml -520 ml











Exam


Constitutional:  alert, oriented


Neck:  supple


Respiratory:  normal air movement


Cardiovascular:  nl pulses


Gastrointestinal:  non-tender, soft


Musculoskeletal:  nl extremities to inspection


Neurological:  other (Left upper extremity weakness)





Results


Result Diagram:  


11/27/17 0541                                                                  

              11/27/17 0541





Results 24 hrs





Laboratory Tests








Test


  11/28/17


17:26 11/28/17


21:07 11/28/17


22:07 11/29/17


08:12


 


Bedside Glucose 181   96   100   171  














Test


  11/29/17


12:05 


  


  


 


 


Bedside Glucose 156     











Medications


Medications





 Current Medications


Hydralazine HCl (Apresoline) 10 mg Q6H  PRN IV SBP>170 Last administered on 11/ 22/17at 17:48; Admin Dose 10 MG;  Start 11/16/17 at 15:30


Diagnostic Test (Pha) (Accu-Chek) 1 ea 02 XX ;  Start 11/17/17 at 02:00


Donepezil HCl (Aricept) 10 mg DAILY PO  Last administered on 11/29/17at 08:38; 

Admin Dose 10 MG;  Start 11/17/17 at 09:00


Lisinopril (Zestril) 40 mg DAILY PO  Last administered on 11/29/17at 08:37; 

Admin Dose 40 MG;  Start 11/17/17 at 09:00


Ranitidine HCl (Zantac) 300 mg HS PO  Last administered on 11/28/17at 20:22; 

Admin Dose 300 MG;  Start 11/16/17 at 21:00


Ondansetron HCl (Zofran Inj) 4 mg Q6H  PRN IV NAUSEA AND/OR VOMITING Last 

administered on 11/25/17at 21:25; Admin Dose 4 MG;  Start 11/16/17 at 16:00


Aspirin (Aspirin) 81 mg DAILY PO  Last administered on 11/29/17at 08:37; Admin 

Dose 81 MG;  Start 11/17/17 at 09:00


Acetaminophen (Tylenol Tab) 650 mg Q6H  PRN PO PAIN LEVEL 1-3 OR FEVER Last 

administered on 11/22/17at 20:54; Admin Dose 650 MG;  Start 11/16/17 at 16:00


Enoxaparin Sodium (Lovenox) 30 mg DAILY SC  Last administered on 11/29/17at 08:

39; Admin Dose 30 MG;  Start 11/17/17 at 09:00


Miscellaneous Information 1 ea NOTE XX ;  Start 11/16/17 at 16:00


Glucose (Glutose) 15 gm Q15M  PRN PO DECREASED GLUCOSE;  Start 11/16/17 at 16:00


Glucose (Glutose) 22.5 gm Q15M  PRN PO DECREASED GLUCOSE;  Start 11/16/17 at 16:

00


Dextrose (D50w Syringe) 25 ml Q15M  PRN IV DECREASED GLUCOSE;  Start 11/16/17 

at 16:00


Dextrose (D50w Syringe) 50 ml Q15M  PRN IV DECREASED GLUCOSE;  Start 11/16/17 

at 16:00


Glucagon (Glucagen) 1 mg Q15M  PRN IM DECREASED GLUCOSE;  Start 11/16/17 at 16:

00


Glucose (Glutose) 15 gm Q15M  PRN BUCCAL DECREASED GLUCOSE;  Start 11/16/17 at 

16:00


Clonidine (Catapres) 0.1 mg Q4H  PRN PO SBP>170 Last administered on 11/21/17at 

14:54; Admin Dose 0.1 MG;  Start 11/17/17 at 18:00


Polyethylene Glycol (Miralax) 17 gm DAILY PO  Last administered on 11/29/17at 08

:39; Admin Dose 17 GM;  Start 11/18/17 at 09:00


Phenol (Cepastat Lozenge) 1 lozenge Q1H  PRN MT COUGH;  Start 11/18/17 at 15:30


Gabapentin (Neurontin) 200 mg TID GTB  Last administered on 11/29/17at 12:16; 

Admin Dose 200 MG;  Start 11/19/17 at 15:00


Hydralazine HCl (Apresoline) 100 mg Q8 PO  Last administered on 11/29/17at 06:10

; Admin Dose 100 MG;  Start 11/20/17 at 14:00


Zolpidem Tartrate (Ambien) 5 mg HS  PRN PO INSOMNIA Last administered on 11/28/ 17at 21:16; Admin Dose 5 MG;  Start 11/21/17 at 01:00


Nifedipine (Procardia Xl) 60 mg BID PO  Last administered on 11/29/17at 08:38; 

Admin Dose 60 MG;  Start 11/21/17 at 21:00


Metoprolol Tartrate (Lopressor) 50 mg BID PO  Last administered on 11/29/17at 08

:38; Admin Dose 50 MG;  Start 11/21/17 at 21:00


Doxazosin Mesylate (Cardura) 2 mg BID PO  Last administered on 11/29/17at 08:38

; Admin Dose 2 MG;  Start 11/22/17 at 21:00


Insulin Glargine (Lantus) 24 unit DAILY@20 SC  Last administered on 11/28/17at 

22:09; Admin Dose 24 UNIT;  Start 11/24/17 at 20:00


Acetaminophen/ Hydrocodone Bitart (Norco (5/325)) 1 tab Q4H  PRN PO PAIN Last 

administered on 11/28/17at 13:22; Admin Dose 1 TAB;  Start 11/27/17 at 13:30


Alprazolam (Xanax) 0.5 mg Q8H  PRN PO ANXIETY;  Start 11/28/17 at 21:00











ALIRIO MC Nov 29, 2017 13:11

## 2018-07-26 NOTE — CONS
Date/Time of Note


Date/Time of Note


DATE: 11/18/17 


TIME: 15:46





Assessment/Plan


Assessment/Plan


Chief Complaint/Hosp Course


IMPRESSION:


1. Hypertensive urgency/emergency, slowly improving on oral antihypertensives.


2. Possible acute cerebrovascular accident.


3. Diabetes mellitus.


4. Abdominal pain.


5. Arm weakness, left upper extremity.


6. Prerenal ischemia.


7. Mild leukocytosis.


8. Troponin-mildly positive on 3rd draw. No CP. Likely type 2 infarct in 

setting of HTN emergency-with no sig uptrend on serial ecg





Recc:


-Tele


-serial ecg's


-Continue ACEI/BB


--Will f/u echo


-continue asa


-pnding neuro eval


Problems:  





Consultation Date/Type/Reason


Admit Date/Time


Nov 16, 2017 at 10:20


Initial Consult Date


11/16/2017


Type of Consultation:  cardiology


Reason for Consultation


HTN/positive troponin


Referring Provider:  ALIRIO MC





Exam/Review of Systems


Vital Signs


Vitals





 Vital Signs








  Date Time  Temp Pulse Resp B/P Pulse Ox O2 Delivery O2 Flow Rate FiO2


 


11/18/17 15:31      Nasal Cannula 2.0 


 


11/18/17 15:00    162/70    


 


11/18/17 12:49  63      


 


11/18/17 11:07 98.6  17  97   














 Intake and Output   


 


 11/17/17 11/17/17 11/18/17





 15:00 23:00 07:00


 


Intake Total 200 ml  300 ml


 


Balance 200 ml  300 ml











Exam


Review of Systems:


CONSTITUTIONAL:  No fevers, chills.


PULMONARY:  No sob


CARDIOVASCULAR: No chest pain/palpitations


GASTROINTESTINAL:  No nausea/vomiting.


GENITOURINARY:  No hematuria/dysuria.


MUSCULOSKELETAL:  No myagias/arthalgias.


PSYCHIATRIC:  The patient denies depression.


NEUROLOGIC:   No weakness


Constitutional:  alert, oriented


Psych:  no complaints


Head:  normocephalic


ENMT:  mucosa pink and moist


Neck:  jvd (9 cm water), supple


Respiratory:  clear to auscultation


Cardiovascular:  regular rate and rhythm


Gastrointestinal:  non-tender, soft


Musculoskeletal:  muscle tone (normal)


Extremities:  edema (trace/B)


Neurological:  other (No focal deficits)





Results


Result Diagram:  


11/18/17 0813                                                                  

              11/18/17 0813





Results 24 hrs





Laboratory Tests








Test


  11/17/17


16:13 11/17/17


17:44 11/17/17


17:47 11/17/17


20:50


 


Bedside Glucose 163   165    173  


 


Creatine Kinase   40   


 


Creatine Kinase Index   6.6   


 


Creatinine Kinase MB (Mass)   2.63  H 


 


Troponin I   0.280  *H 














Test


  11/18/17


08:01 11/18/17


08:13 11/18/17


12:00 


 


 


Bedside Glucose 138    144   


 


White Blood Count  9.3  #  


 


Red Blood Count  4.58    


 


Hemoglobin  13.4    


 


Hematocrit  40.9    


 


Mean Corpuscular Volume  89.3    


 


Mean Corpuscular Hemoglobin  29.3    


 


Mean Corpuscular Hemoglobin


Concent 


  32.8  


  


  


 


 


Red Cell Distribution Width  14.0    


 


Platelet Count  245    


 


Mean Platelet Volume  10.1    


 


Neutrophils %  73.6    


 


Lymphocytes %  20.4    


 


Monocytes %  4.9    


 


Eosinophils %  0.3    


 


Basophils %  0.3    


 


Nucleated Red Blood Cells %  0.0    


 


Neutrophils #  6.8    


 


Lymphocytes #  1.9    


 


Monocytes #  0.5    


 


Eosinophils #  0.0    


 


Basophils #  0.0    


 


Nucleated Red Blood Cells #  0.0    


 


Sodium Level  139    


 


Potassium Level  4.1    


 


Chloride Level  101    


 


Carbon Dioxide Level  28    


 


Anion Gap  14    


 


Blood Urea Nitrogen  19    


 


Creatinine  0.81    


 


Glucose Level  158    


 


Calcium Level  10.5  H  











Medications


Medications





 Current Medications


Hydralazine HCl (Apresoline) 10 mg Q6H  PRN IV SBP>170 Last administered on 11/ 17/17at 17:21; Admin Dose 10 MG;  Start 11/16/17 at 15:30


Insulin Glargine (Lantus) 21 unit DAILY@20 SC  Last administered on 11/17/17at 

21:02; Admin Dose 21 UNIT;  Start 11/16/17 at 20:00


Diagnostic Test (Pha) (Accu-Chek) 1 ea 02 XX ;  Start 11/17/17 at 02:00


Donepezil HCl (Aricept) 10 mg DAILY PO  Last administered on 11/18/17at 08:54; 

Admin Dose 10 MG;  Start 11/17/17 at 09:00


Lisinopril (Zestril) 40 mg DAILY PO  Last administered on 11/18/17at 08:54; 

Admin Dose 40 MG;  Start 11/17/17 at 09:00


Ranitidine HCl (Zantac) 300 mg HS PO  Last administered on 11/17/17at 20:48; 

Admin Dose 300 MG;  Start 11/16/17 at 21:00


Ondansetron HCl (Zofran Inj) 4 mg Q6H  PRN IV NAUSEA AND/OR VOMITING Last 

administered on 11/18/17at 12:36; Admin Dose 4 MG;  Start 11/16/17 at 16:00


Aspirin (Aspirin) 81 mg DAILY PO  Last administered on 11/18/17at 08:54; Admin 

Dose 81 MG;  Start 11/17/17 at 09:00


Acetaminophen (Tylenol Tab) 650 mg Q6H  PRN PO PAIN LEVEL 1-3 OR FEVER;  Start 

11/16/17 at 16:00


Enoxaparin Sodium (Lovenox) 30 mg DAILY SC  Last administered on 11/18/17at 08:

58; Admin Dose 30 MG;  Start 11/17/17 at 09:00


Miscellaneous Information 1 ea NOTE XX ;  Start 11/16/17 at 16:00


Glucose (Glutose) 15 gm Q15M  PRN PO DECREASED GLUCOSE;  Start 11/16/17 at 16:00


Glucose (Glutose) 22.5 gm Q15M  PRN PO DECREASED GLUCOSE;  Start 11/16/17 at 16:

00


Dextrose (D50w Syringe) 25 ml Q15M  PRN IV DECREASED GLUCOSE;  Start 11/16/17 

at 16:00


Dextrose (D50w Syringe) 50 ml Q15M  PRN IV DECREASED GLUCOSE;  Start 11/16/17 

at 16:00


Glucagon (Glucagen) 1 mg Q15M  PRN IM DECREASED GLUCOSE;  Start 11/16/17 at 16:

00


Glucose (Glutose) 15 gm Q15M  PRN BUCCAL DECREASED GLUCOSE;  Start 11/16/17 at 

16:00


Metoprolol Tartrate 25 mg 25 mg BID PO  Last administered on 11/18/17at 08:55; 

Admin Dose 25 MG;  Start 11/17/17 at 21:00


Ceftriaxone Sodium (Rocephin) 50 ml @  100 mls/hr Q24H IVPB  Last administered 

on 11/17/17at 21:17; Admin Dose 100 MLS/HR;  Start 11/17/17 at 18:00


Clonidine (Catapres) 0.1 mg Q4H  PRN PO SBP>170 Last administered on 11/18/17at 

12:33; Admin Dose 0.1 MG;  Start 11/17/17 at 18:00


Tramadol HCl (Ultram) 50 mg Q6H PO  Last administered on 11/18/17at 09:43; 

Admin Dose 50 MG;  Start 11/18/17 at 10:00


Oxycodone HCl (Oxycontin) 10 mg AM PO  Last administered on 11/18/17at 09:44; 

Admin Dose 10 MG;  Start 11/18/17 at 09:00


Polyethylene Glycol (Miralax) 17 gm DAILY PO  Last administered on 11/18/17at 09

:44; Admin Dose 17 GM;  Start 11/18/17 at 09:00


Hydralazine HCl (Apresoline) 50 mg Q8 PO  Last administered on 11/18/17at 12:32

; Admin Dose 50 MG;  Start 11/18/17 at 12:30


Alprazolam (Xanax) 0.5 mg Q8H  PRN PO ANXIETY;  Start 11/18/17 at 11:30


Phenol (Cepastat Lozenge) 1 lozenge Q1H  PRN MT COUGH;  Start 11/18/17 at 15:30











ZULEIKA DUENAS Nov 18, 2017 15:53 ambulatory

## 2018-07-27 ENCOUNTER — HOSPITAL ENCOUNTER (EMERGENCY)
Dept: HOSPITAL 91 - E/R | Age: 78
Discharge: HOME | End: 2018-07-27
Payer: MEDICARE

## 2018-07-27 ENCOUNTER — HOSPITAL ENCOUNTER (EMERGENCY)
Age: 78
Discharge: HOME | End: 2018-07-27

## 2018-07-27 DIAGNOSIS — E11.9: ICD-10-CM

## 2018-07-27 DIAGNOSIS — I10: ICD-10-CM

## 2018-07-27 DIAGNOSIS — Z79.4: ICD-10-CM

## 2018-07-27 DIAGNOSIS — Z79.82: ICD-10-CM

## 2018-07-27 DIAGNOSIS — R10.12: Primary | ICD-10-CM

## 2018-07-27 LAB
ADD MAN DIFF?: NO
ADD UMIC: YES
ALANINE AMINOTRANSFERASE: 18 IU/L (ref 13–69)
ALBUMIN/GLOBULIN RATIO: 1.05
ALBUMIN: 3.7 G/DL (ref 3.3–4.9)
ALKALINE PHOSPHATASE: 132 IU/L (ref 42–121)
ANION GAP: 11 (ref 8–16)
ASPARTATE AMINO TRANSFERASE: 19 IU/L (ref 15–46)
BASOPHIL #: 0 10^3/UL (ref 0–0.1)
BASOPHILS %: 0.4 % (ref 0–2)
BILIRUBIN,DIRECT: 0 MG/DL (ref 0–0.2)
BILIRUBIN,TOTAL: 0.2 MG/DL (ref 0.2–1.3)
BLOOD UREA NITROGEN: 15 MG/DL (ref 7–20)
CALCIUM: 10.1 MG/DL (ref 8.4–10.2)
CARBON DIOXIDE: 25 MMOL/L (ref 21–31)
CHLORIDE: 108 MMOL/L (ref 97–110)
CREATININE: 0.81 MG/DL (ref 0.44–1)
EOSINOPHILS #: 0.2 10^3/UL (ref 0–0.5)
EOSINOPHILS %: 1.8 % (ref 0–7)
GLOBULIN: 3.5 G/DL (ref 1.3–3.2)
GLUCOSE: 136 MG/DL (ref 70–220)
HEMATOCRIT: 40.3 % (ref 37–47)
HEMOGLOBIN: 13 G/DL (ref 12–16)
LIPASE: 34 U/L (ref 23–300)
LYMPHOCYTES #: 2.7 10^3/UL (ref 0.8–2.9)
LYMPHOCYTES %: 28.7 % (ref 15–51)
MEAN CORPUSCULAR HEMOGLOBIN: 30.2 PG (ref 29–33)
MEAN CORPUSCULAR HGB CONC: 32.3 G/DL (ref 32–37)
MEAN CORPUSCULAR VOLUME: 93.5 FL (ref 82–101)
MEAN PLATELET VOLUME: 10 FL (ref 7.4–10.4)
MONOCYTE #: 0.4 10^3/UL (ref 0.3–0.9)
MONOCYTES %: 4.4 % (ref 0–11)
NEUTROPHIL #: 6 10^3/UL (ref 1.6–7.5)
NEUTROPHILS %: 64.4 % (ref 39–77)
NUCLEATED RED BLOOD CELLS #: 0 10^3/UL (ref 0–0)
NUCLEATED RED BLOOD CELLS%: 0 /100WBC (ref 0–0)
PLATELET COUNT: 239 10^3/UL (ref 140–415)
POTASSIUM: 4.2 MMOL/L (ref 3.5–5.1)
RED BLOOD COUNT: 4.31 10^6/UL (ref 4.2–5.4)
RED CELL DISTRIBUTION WIDTH: 14 % (ref 11.5–14.5)
SODIUM: 140 MMOL/L (ref 135–144)
TOTAL PROTEIN: 7.2 G/DL (ref 6.1–8.1)
UR ASCORBIC ACID: NEGATIVE MG/DL
UR BACTERIA: (no result) /HPF
UR BILIRUBIN (DIP): NEGATIVE MG/DL
UR BLOOD (DIP): (no result) MG/DL
UR CLARITY: CLEAR
UR COLOR: YELLOW
UR GLUCOSE (DIP): NEGATIVE MG/DL
UR KETONES (DIP): NEGATIVE MG/DL
UR LEUKOCYTE ESTERASE (DIP): NEGATIVE LEU/UL
UR NITRITE (DIP): NEGATIVE MG/DL
UR PH (DIP): 7 (ref 5–9)
UR RBC: 0 /HPF (ref 0–5)
UR SPECIFIC GRAVITY (DIP): 1.01 (ref 1–1.03)
UR TOTAL PROTEIN (DIP): NEGATIVE MG/DL
UR UROBILINOGEN (DIP): NEGATIVE MG/DL
UR WBC: 2 /HPF (ref 0–5)
WHITE BLOOD COUNT: 9.4 10^3/UL (ref 4.8–10.8)

## 2018-07-27 PROCEDURE — 96375 TX/PRO/DX INJ NEW DRUG ADDON: CPT

## 2018-07-27 PROCEDURE — 83690 ASSAY OF LIPASE: CPT

## 2018-07-27 PROCEDURE — 96374 THER/PROPH/DIAG INJ IV PUSH: CPT

## 2018-07-27 PROCEDURE — 74177 CT ABD & PELVIS W/CONTRAST: CPT

## 2018-07-27 PROCEDURE — 81001 URINALYSIS AUTO W/SCOPE: CPT

## 2018-07-27 PROCEDURE — 36415 COLL VENOUS BLD VENIPUNCTURE: CPT

## 2018-07-27 PROCEDURE — 85025 COMPLETE CBC W/AUTO DIFF WBC: CPT

## 2018-07-27 PROCEDURE — 80053 COMPREHEN METABOLIC PANEL: CPT

## 2018-07-27 PROCEDURE — 99285 EMERGENCY DEPT VISIT HI MDM: CPT

## 2018-07-27 PROCEDURE — 96361 HYDRATE IV INFUSION ADD-ON: CPT

## 2018-07-27 RX ADMIN — HYDROMORPHONE HYDROCHLORIDE 1 MG: 1 INJECTION, SOLUTION INTRAMUSCULAR; INTRAVENOUS; SUBCUTANEOUS at 10:24

## 2018-07-27 RX ADMIN — ONDANSETRON HYDROCHLORIDE 1 MG: 2 INJECTION, SOLUTION INTRAMUSCULAR; INTRAVENOUS at 10:24

## 2018-07-27 RX ADMIN — IOHEXOL 1 ML: 300 INJECTION, SOLUTION INTRAVENOUS at 12:54

## 2018-07-27 RX ADMIN — LIDOCAINE HYDROCHLORIDE 1 MLS/HR: 10 INJECTION, SOLUTION EPIDURAL; INFILTRATION; INTRACAUDAL; PERINEURAL at 10:24

## 2018-07-27 RX ADMIN — VASOPRESSIN 1 ML/SEC: 20 INJECTION, SOLUTION INTRAMUSCULAR; SUBCUTANEOUS at 12:54

## 2018-08-20 ENCOUNTER — HOSPITAL ENCOUNTER (EMERGENCY)
Dept: HOSPITAL 91 - E/R | Age: 78
Discharge: HOME | End: 2018-08-20
Payer: MEDICARE

## 2018-08-20 ENCOUNTER — HOSPITAL ENCOUNTER (EMERGENCY)
Age: 78
Discharge: HOME | End: 2018-08-20

## 2018-08-20 DIAGNOSIS — I10: ICD-10-CM

## 2018-08-20 DIAGNOSIS — K59.00: Primary | ICD-10-CM

## 2018-08-20 DIAGNOSIS — Z79.4: ICD-10-CM

## 2018-08-20 DIAGNOSIS — E11.9: ICD-10-CM

## 2018-08-20 LAB
ADD MAN DIFF?: NO
ADD UMIC: YES
ALANINE AMINOTRANSFERASE: 11 IU/L (ref 13–69)
ALBUMIN/GLOBULIN RATIO: 1.11
ALBUMIN: 4 G/DL (ref 3.3–4.9)
ALKALINE PHOSPHATASE: 154 IU/L (ref 42–121)
ANION GAP: 16 (ref 8–16)
ASPARTATE AMINO TRANSFERASE: 17 IU/L (ref 15–46)
BASOPHIL #: 0 10^3/UL (ref 0–0.1)
BASOPHILS %: 0.4 % (ref 0–2)
BILIRUBIN,DIRECT: 0 MG/DL (ref 0–0.2)
BILIRUBIN,TOTAL: 0.2 MG/DL (ref 0.2–1.3)
BLOOD UREA NITROGEN: 18 MG/DL (ref 7–20)
CALCIUM: 10.8 MG/DL (ref 8.4–10.2)
CARBON DIOXIDE: 29 MMOL/L (ref 21–31)
CHLORIDE: 104 MMOL/L (ref 97–110)
CREATININE: 0.88 MG/DL (ref 0.44–1)
EOSINOPHILS #: 0.1 10^3/UL (ref 0–0.5)
EOSINOPHILS %: 0.8 % (ref 0–7)
GLOBULIN: 3.6 G/DL (ref 1.3–3.2)
GLUCOSE: 159 MG/DL (ref 70–220)
HEMATOCRIT: 37.1 % (ref 37–47)
HEMOGLOBIN: 11.9 G/DL (ref 12–16)
IMMATURE GRANS #M: 0.06 10^3/UL (ref 0–0.03)
IMMATURE GRANS % (M): 0.6 % (ref 0–0.43)
LIPASE: 40 U/L (ref 23–300)
LYMPHOCYTES #: 2 10^3/UL (ref 0.8–2.9)
LYMPHOCYTES %: 19 % (ref 15–51)
MEAN CORPUSCULAR HEMOGLOBIN: 29.9 PG (ref 29–33)
MEAN CORPUSCULAR HGB CONC: 32.1 G/DL (ref 32–37)
MEAN CORPUSCULAR VOLUME: 93.2 FL (ref 82–101)
MEAN PLATELET VOLUME: 10.4 FL (ref 7.4–10.4)
MONOCYTE #: 0.4 10^3/UL (ref 0.3–0.9)
MONOCYTES %: 4.2 % (ref 0–11)
NEUTROPHIL #: 7.8 10^3/UL (ref 1.6–7.5)
NEUTROPHILS %: 75 % (ref 39–77)
NUCLEATED RED BLOOD CELLS #: 0 10^3/UL (ref 0–0)
NUCLEATED RED BLOOD CELLS%: 0 /100WBC (ref 0–0)
PLATELET COUNT: 211 10^3/UL (ref 140–415)
POTASSIUM: 5.1 MMOL/L (ref 3.5–5.1)
RED BLOOD COUNT: 3.98 10^6/UL (ref 4.2–5.4)
RED CELL DISTRIBUTION WIDTH: 13.4 % (ref 11.5–14.5)
SODIUM: 144 MMOL/L (ref 135–144)
TOTAL PROTEIN: 7.6 G/DL (ref 6.1–8.1)
UR ASCORBIC ACID: NEGATIVE MG/DL
UR BACTERIA: (no result) /HPF
UR BILIRUBIN (DIP): NEGATIVE MG/DL
UR BLOOD (DIP): NEGATIVE MG/DL
UR CLARITY: CLEAR
UR COLOR: (no result)
UR GLUCOSE (DIP): NEGATIVE MG/DL
UR KETONES (DIP): NEGATIVE MG/DL
UR LEUKOCYTE ESTERASE (DIP): NEGATIVE LEU/UL
UR NITRITE (DIP): POSITIVE MG/DL
UR PH (DIP): 7 (ref 5–9)
UR RBC: 0 /HPF (ref 0–5)
UR SPECIFIC GRAVITY (DIP): 1.01 (ref 1–1.03)
UR TOTAL PROTEIN (DIP): NEGATIVE MG/DL
UR UROBILINOGEN (DIP): (no result) MG/DL
UR WBC: 1 /HPF (ref 0–5)
WHITE BLOOD COUNT: 10.4 10^3/UL (ref 4.8–10.8)

## 2018-08-20 PROCEDURE — 80053 COMPREHEN METABOLIC PANEL: CPT

## 2018-08-20 PROCEDURE — 76705 ECHO EXAM OF ABDOMEN: CPT

## 2018-08-20 PROCEDURE — 85025 COMPLETE CBC W/AUTO DIFF WBC: CPT

## 2018-08-20 PROCEDURE — 96375 TX/PRO/DX INJ NEW DRUG ADDON: CPT

## 2018-08-20 PROCEDURE — 83690 ASSAY OF LIPASE: CPT

## 2018-08-20 PROCEDURE — 74178 CT ABD&PLV WO CNTR FLWD CNTR: CPT

## 2018-08-20 PROCEDURE — 81001 URINALYSIS AUTO W/SCOPE: CPT

## 2018-08-20 PROCEDURE — 36415 COLL VENOUS BLD VENIPUNCTURE: CPT

## 2018-08-20 PROCEDURE — 96374 THER/PROPH/DIAG INJ IV PUSH: CPT

## 2018-08-20 PROCEDURE — 99285 EMERGENCY DEPT VISIT HI MDM: CPT

## 2018-08-20 RX ADMIN — IODIXANOL 1 ML: 320 INJECTION, SOLUTION INTRAVASCULAR at 12:40

## 2018-08-20 RX ADMIN — VASOPRESSIN 1 ML/12 SEC: 20 INJECTION, SOLUTION INTRAMUSCULAR; SUBCUTANEOUS at 12:40

## 2018-08-20 RX ADMIN — SODIUM CHLORIDE 1 ML: 9 INJECTION, SOLUTION INTRAMUSCULAR; INTRAVENOUS; SUBCUTANEOUS at 12:39

## 2018-08-20 RX ADMIN — FAMOTIDINE 1 MG: 10 INJECTION, SOLUTION INTRAVENOUS at 10:47

## 2018-08-20 RX ADMIN — LIDOCAINE HYDROCHLORIDE 1 MLS/HR: 10 INJECTION, SOLUTION EPIDURAL; INFILTRATION; INTRACAUDAL; PERINEURAL at 10:47

## 2018-08-20 RX ADMIN — ONDANSETRON HYDROCHLORIDE 1 MG: 2 INJECTION, SOLUTION INTRAMUSCULAR; INTRAVENOUS at 10:47

## 2019-06-22 ENCOUNTER — HOSPITAL ENCOUNTER (EMERGENCY)
Dept: HOSPITAL 10 - E/R | Age: 79
Discharge: HOME | End: 2019-06-22
Payer: MEDICARE

## 2019-06-22 ENCOUNTER — HOSPITAL ENCOUNTER (EMERGENCY)
Dept: HOSPITAL 91 - E/R | Age: 79
Discharge: HOME | End: 2019-06-22
Payer: MEDICARE

## 2019-06-22 VITALS — HEART RATE: 73 BPM | SYSTOLIC BLOOD PRESSURE: 138 MMHG | DIASTOLIC BLOOD PRESSURE: 76 MMHG | RESPIRATION RATE: 18 BRPM

## 2019-06-22 VITALS
WEIGHT: 189.38 LBS | HEIGHT: 65 IN | BODY MASS INDEX: 31.55 KG/M2 | BODY MASS INDEX: 31.55 KG/M2 | WEIGHT: 189.38 LBS | HEIGHT: 65 IN

## 2019-06-22 DIAGNOSIS — Z79.4: ICD-10-CM

## 2019-06-22 DIAGNOSIS — N30.00: Primary | ICD-10-CM

## 2019-06-22 DIAGNOSIS — E83.52: ICD-10-CM

## 2019-06-22 DIAGNOSIS — I10: ICD-10-CM

## 2019-06-22 LAB
ADD MAN DIFF?: NO
ADD UMIC: YES
ANION GAP: 7 (ref 5–13)
BASOPHIL #: 0 10^3/UL (ref 0–0.1)
BASOPHILS %: 0.3 % (ref 0–2)
BLOOD UREA NITROGEN: 21 MG/DL (ref 7–20)
CALCIUM: 11 MG/DL (ref 8.4–10.2)
CARBON DIOXIDE: 28 MMOL/L (ref 21–31)
CHLORIDE: 104 MMOL/L (ref 97–110)
CREATININE: 0.88 MG/DL (ref 0.44–1)
EOSINOPHILS #: 0.2 10^3/UL (ref 0–0.5)
EOSINOPHILS %: 2.1 % (ref 0–7)
GLUCOSE: 121 MG/DL (ref 70–220)
HEMATOCRIT: 38.7 % (ref 37–47)
HEMOGLOBIN: 12.5 G/DL (ref 12–16)
IMMATURE GRANS #M: 0.04 10^3/UL (ref 0–0.03)
IMMATURE GRANS % (M): 0.4 % (ref 0–0.43)
LYMPHOCYTES #: 2.5 10^3/UL (ref 0.8–2.9)
LYMPHOCYTES %: 24.5 % (ref 15–51)
MEAN CORPUSCULAR HEMOGLOBIN: 30.2 PG (ref 29–33)
MEAN CORPUSCULAR HGB CONC: 32.3 G/DL (ref 32–37)
MEAN CORPUSCULAR VOLUME: 93.5 FL (ref 82–101)
MEAN PLATELET VOLUME: 9.9 FL (ref 7.4–10.4)
MONOCYTE #: 0.4 10^3/UL (ref 0.3–0.9)
MONOCYTES %: 4 % (ref 0–11)
NEUTROPHIL #: 7.1 10^3/UL (ref 1.6–7.5)
NEUTROPHILS %: 68.7 % (ref 39–77)
NUCLEATED RED BLOOD CELLS #: 0 10^3/UL (ref 0–0)
NUCLEATED RED BLOOD CELLS%: 0 /100WBC (ref 0–0)
PLATELET COUNT: 225 10^3/UL (ref 140–415)
POTASSIUM: 4.4 MMOL/L (ref 3.5–5.1)
RED BLOOD COUNT: 4.14 10^6/UL (ref 4.2–5.4)
RED CELL DISTRIBUTION WIDTH: 13.8 % (ref 11.5–14.5)
SODIUM: 139 MMOL/L (ref 135–144)
UR ASCORBIC ACID: NEGATIVE MG/DL
UR BACTERIA: (no result) /HPF
UR BILIRUBIN (DIP): NEGATIVE MG/DL
UR BLOOD (DIP): NEGATIVE MG/DL
UR CLARITY: (no result)
UR COLOR: YELLOW
UR GLUCOSE (DIP): NEGATIVE MG/DL
UR KETONES (DIP): NEGATIVE MG/DL
UR LEUKOCYTE ESTERASE (DIP): (no result) LEU/UL
UR MUCUS: (no result) /HPF
UR NITRITE (DIP): NEGATIVE MG/DL
UR PH (DIP): 6 (ref 5–9)
UR RBC: 1 /HPF (ref 0–5)
UR SPECIFIC GRAVITY (DIP): 1.02 (ref 1–1.03)
UR SQUAMOUS EPITHELIAL CELL: (no result) /HPF
UR TOTAL PROTEIN (DIP): NEGATIVE MG/DL
UR UROBILINOGEN (DIP): NEGATIVE MG/DL
UR WBC: 73 /HPF (ref 0–5)
WHITE BLOOD COUNT: 10.3 10^3/UL (ref 4.8–10.8)

## 2019-06-22 PROCEDURE — 85025 COMPLETE CBC W/AUTO DIFF WBC: CPT

## 2019-06-22 PROCEDURE — 81001 URINALYSIS AUTO W/SCOPE: CPT

## 2019-06-22 PROCEDURE — 87086 URINE CULTURE/COLONY COUNT: CPT

## 2019-06-22 PROCEDURE — 36415 COLL VENOUS BLD VENIPUNCTURE: CPT

## 2019-06-22 PROCEDURE — 99284 EMERGENCY DEPT VISIT MOD MDM: CPT

## 2019-06-22 PROCEDURE — 96374 THER/PROPH/DIAG INJ IV PUSH: CPT

## 2019-06-22 PROCEDURE — 80048 BASIC METABOLIC PNL TOTAL CA: CPT

## 2019-06-22 RX ADMIN — THIAMINE HYDROCHLORIDE 1 MLS/HR: 100 INJECTION, SOLUTION INTRAMUSCULAR; INTRAVENOUS at 14:20

## 2019-06-22 RX ADMIN — CEFTRIAXONE 1 MLS/HR: 1 INJECTION, SOLUTION INTRAVENOUS at 16:04

## 2019-06-22 NOTE — ERD
ER Documentation


Chief Complaint


Chief Complaint





painful urination-pelvic, abdominal, flank pain x 1 week





HPI


78-year-old female brought in by daughter for painful urination for the past few


days.  She is complaining of burning with urination and suprapubic pain.  Per 


the daughter, the patient always has chronic pain all over her body for which 


she takes tramadol.  Today she gave her mother Aleve for her headache and 


currently the patient is denying any headache.  She complains of diffuse back 


pain but denies any focal flank pain.  No fevers or chills.  No nausea or 


vomiting.  She has chronic problems with constipation but is passing gas.





ROS


All systems reviewed and are negative except as per history of present illness.





Medications


Home Meds


Active Scripts


Cephalexin* (Keflex*) 500 Mg Capsule, 500 MG PO BID for 7 Days, CAP


   Prov:ESPERANZA LEMA MD         6/22/19


Reported Medications


Levothyroxine Sodium* (Levoxyl*) 50 Mcg Tablet, 50 MCG PO BEFORE BREAKFAST, #30 


TAB


   6/22/19


Hydrochlorothiazide* (Hydrochlorothiazide*) 25 Mg Tab, 25 MG PO DAILY, #30 TAB


   6/22/19


Tramadol Hcl* (Ultram*) 50 Mg Tablet, 50 MG PO Q6H PRN for PAIN, TAB


   6/22/19


Alprazolam* (Xanax*) 0.5 Mg Tab, 0.5 MG PO Q8H PRN for ANXIETY, TAB


   7/27/18


Insulin Glargine* (Lantus*) 100 Unit/Ml Soln, 25 UNIT SC QHS, #1 VIAL


   7/27/18


Insulin Glargine* (Lantus*) 100 Unit/Ml Soln, 40 UNIT SC QAM, #1 VIAL


   7/27/18


Amlodipine Besylate* (Amlodipine Besylate*) 10 Mg Tablet, 10 MG PO DAILY, #30 


TAB


   7/27/18


Metoprolol Succinate* (Toprol XL*) 100 Mg Tab.sr.24h, 100 MG PO DAILY, #30 TAB


   7/27/18


Lisinopril* (Lisinopril*) 40 Mg Tablet, 40 MG PO DAILY, #30 TAB


   7/27/18


Pantoprazole* (Pantoprazole*) 40 Mg Tablet.dr, 40 MG PO AC BREAKFAST, TAB


   7/27/18


Donepezil* (Donepezil*) 10 Mg Tablet, 10 MG PO DAILY, #30 TAB


   7/27/18


Discontinued Reported Medications


Gabapentin* (Gabapentin*) 400 Mg Capsule, 400 MG PO BID, #90 CAP


   7/27/18


Hydrocodone/Acetaminophen (Norco 5-325 Tablet) 1 Each Tablet, 1 EACH PO AS 


NEEDED, TAB


   7/27/18


Discontinued Scripts


Famotidine* (Pepcid*) 20 Mg Tablet, 20 MG PO BID for 10 Days, TAB


   Prov:RAYMUNDO HERBERT MD         8/20/18


Tramadol HCl (Tramadol HCl) 50 Mg Tablet, 50 MG PO Q6, #12 TAB


   Prov:RAYMUNDO HERBERT MD         8/20/18


Polyethylene Glycol* (Miralax*) 17 Gm Powd.pack, 17 GM PO DAILY, #7


   Prov:RAYMUNDO HERBERT MD         8/20/18


Lidocaine (Lidocaine Viscous) 100 Ml Soln, 15 ML MM BEFORE MEALS, #120 ML


   Prov:GUERO NORRIS DO         7/27/18





Allergies


Allergies:  


Coded Allergies:  


     No Known Allergies (Verified  Allergy, Mild, 6/22/19)





PMhx/Soc


History of Surgery:  Yes (CATARACT SURGERY BOTH EYE)


Anesthesia Reaction:  No


Hx Neurological Disorder:  No


Hx Respiratory Disorders:  No


Hx Cardiac Disorders:  Yes (HTN, HIGH CHOLESTEROL)


Hx Psychiatric Problems:  No


Hx Miscellaneous Medical Probl:  Yes (ARTHRITIS, GASTRITIS)


Hx Alcohol Use:  No


Hx Substance Use:  No


Hx Tobacco Use:  No


Smoking Status:  Never smoker





FmHx


Family History:  No diabetes





Physical Exam


Vitals





Vital Signs


  Date      Temp  Pulse  Resp  B/P (MAP)   Pulse Ox  O2          O2 Flow    FiO2


Time                                                 Delivery    Rate


   6/22/19  99.0     78    18      172/72        97


     12:43                          (105)





Physical Exam


Const:   No acute distress, nontoxic.  Obese


Head:   Atraumatic 


Eyes:    Normal Conjunctiva


ENT:    Normal External Ears, Nose and Mouth.


Neck:               Full range of motion. No meningismus.


Resp:   Clear to auscultation bilaterally


Cardio:   Regular rate and rhythm, no murmurs


Abd:    Soft, mild suprapubic tenderness to palpation with no rebound or 


guarding.  No masses. non distended. Normal bowel sounds


Skin:   No petechiae or rashes


Back:   No midline or flank tenderness


Ext:    No cyanosis, or edema


Neur:   Awake and alert


Psych:    Normal Mood and Affect


Result Diagram:  


6/22/19 1410                                                                    


           6/22/19 1410





Results 24 hrs





Laboratory Tests


              Test
                                  6/22/19
14:10


              White Blood Count                      10.3 10^3/ul


              Red Blood Count                        4.14 10^6/ul


              Hemoglobin                                12.5 g/dl


              Hematocrit                                   38.7 %


              Mean Corpuscular Volume                     93.5 fl


              Mean Corpuscular Hemoglobin                 30.2 pg


              Mean Corpuscular Hemoglobin
Concent      32.3 g/dl 



              Red Cell Distribution Width                  13.8 %


              Platelet Count                          225 10^3/UL


              Mean Platelet Volume                         9.9 fl


              Immature Granulocytes %                     0.400 %


              Neutrophils %                                68.7 %


              Lymphocytes %                                24.5 %


              Monocytes %                                   4.0 %


              Eosinophils %                                 2.1 %


              Basophils %                                   0.3 %


              Nucleated Red Blood Cells %             0.0 /100WBC


              Immature Granulocytes #               0.040 10^3/ul


              Neutrophils #                           7.1 10^3/ul


              Lymphocytes #                           2.5 10^3/ul


              Monocytes #                             0.4 10^3/ul


              Eosinophils #                           0.2 10^3/ul


              Basophils #                             0.0 10^3/ul


              Nucleated Red Blood Cells #             0.0 10^3/ul


              Urine Color                          YELLOW


              Urine Clarity                        CLOUDY


              Urine pH                                        6.0


              Urine Specific Gravity                        1.020


              Urine Ketones                        NEGATIVE mg/dL


              Urine Nitrite                        NEGATIVE mg/dL


              Urine Bilirubin                      NEGATIVE mg/dL


              Urine Urobilinogen                   NEGATIVE mg/dL


              Urine Leukocyte Esterase                  3+ Johnnie/ul


              Urine Microscopic RBC                        1 /HPF


              Urine Microscopic WBC                       73 /HPF


              Urine Squamous Epithelial
Cells      MODERATE /HPF 



              Urine Bacteria                       FEW /HPF


              Urine Mucus                          FEW /HPF


              Urine Hemoglobin                     NEGATIVE mg/dL


              Urine Glucose                        NEGATIVE mg/dL


              Urine Total Protein                  NEGATIVE mg/dl


              Sodium Level                             139 mmol/L


              Potassium Level                          4.4 mmol/L


              Chloride Level                           104 mmol/L


              Carbon Dioxide Level                      28 mmol/L


              Anion Gap                                         7


              Blood Urea Nitrogen                        21 mg/dl


              Creatinine                               0.88 mg/dl


              Est Glomerular Filtrat Rate
mL/min    mL/min 



              Glucose Level                             121 mg/dl


              Calcium Level                            11.0 mg/dl





Current Medications


 Medications
   Dose
          Sig/Ac
       Start Time
   Status  Last


 (Trade)       Ordered        Route
 PRN     Stop Time              Admin
Dose


                              Reason                                Admin


 Sodium         1,000 ml @ 
   Q1H STAT
      6/22/19       DC           6/22/19


Chloride       1,000 mls/hr   IV
            13:51
                       14:20



                                             6/22/19 14:50


 Ceftriaxone    50 ml @ 
      ONCE  ONCE
    6/22/19                



Sodium         100 mls/hr     IVPB
          15:30



                                             6/22/19 15:59








Procedures/MDM


EMERGENT LABS AND DIAGNOSTIC STUDIES: 


Lab Results above were reviewed and interpreted by me. 





CBC: no anemia or evidence of infection


BMP: Mild hypercalcemia.  No e/o other electrolyte abnormality, severe acidosis,


alkalosis, renal failure, diabetic ketoacidosis


UA: abnormalities consistent with UTI





___________________________________________________________ 


Initial Nursing notes reviewed. 


Previous Medical Records requested via the Electronic Health Record. 





EMERGENCY DEPARTMENT COURSE / MEDICAL DECISION MAKING: 





Patient is presenting with recurrent UTI.  She is afebrile with normal vitals.  


No evidence of acute surgical abdomen.  I have a low suspicion for 


pyelonephritis.  Her urinalysis was consistent with UTI.  She was noted to have 


mild hypercalcemia for which I instructed she follow-up with her primary care 


doctor.  She was given 1 L of IV fluids here.  Repeat blood work will be 


recommended within the next few days by primary care doctor.  Patient will be 


discharged with Keflex.  1 dose of ceftriaxone given here prior to discharge





Patient's blood pressure was elevated (>120/80) but appears stable without 


evidence of hypertensive emergency or urgency. The patient was counseled about 


the risks of hypertension and urged to pursue outpatient monitoring and therapy 


within a week with their primary care physician.





Departure


Diagnosis:  


   Primary Impression:  


   UTI (urinary tract infection)


   Urinary tract infection type:  acute cystitis  Hematuria presence:  without 


   hematuria  Qualified Codes:  N30.00 - Acute cystitis without hematuria


   Additional Impression:  


   Hypercalcemia


Condition:  Stable


Patient Instructions:  Hypercalcemia, Cystitis





Additional Instructions:  


William lynette javed con garcia medico primario en 2-3 oneill.











ESPERANZA LEMA MD         Jun 22, 2019 15:49

## 2019-07-28 ENCOUNTER — HOSPITAL ENCOUNTER (EMERGENCY)
Dept: HOSPITAL 10 - E/R | Age: 79
Discharge: HOME | End: 2019-07-28
Payer: MEDICARE

## 2019-07-28 ENCOUNTER — HOSPITAL ENCOUNTER (EMERGENCY)
Dept: HOSPITAL 91 - E/R | Age: 79
Discharge: HOME | End: 2019-07-28
Payer: MEDICARE

## 2019-07-28 VITALS — HEART RATE: 70 BPM | RESPIRATION RATE: 17 BRPM | SYSTOLIC BLOOD PRESSURE: 130 MMHG | DIASTOLIC BLOOD PRESSURE: 50 MMHG

## 2019-07-28 VITALS
WEIGHT: 194.01 LBS | HEIGHT: 62 IN | BODY MASS INDEX: 35.7 KG/M2 | WEIGHT: 194.01 LBS | BODY MASS INDEX: 35.7 KG/M2 | HEIGHT: 62 IN

## 2019-07-28 DIAGNOSIS — Z79.4: ICD-10-CM

## 2019-07-28 DIAGNOSIS — E11.9: ICD-10-CM

## 2019-07-28 DIAGNOSIS — I10: ICD-10-CM

## 2019-07-28 DIAGNOSIS — N30.90: Primary | ICD-10-CM

## 2019-07-28 LAB
ADD MAN DIFF?: NO
ADD UMIC: YES
ALANINE AMINOTRANSFERASE: 15 IU/L (ref 13–69)
ALBUMIN/GLOBULIN RATIO: 1.05
ALBUMIN: 4.1 G/DL (ref 3.3–4.9)
ALKALINE PHOSPHATASE: 149 IU/L (ref 42–121)
ANION GAP: 8 (ref 5–13)
ASPARTATE AMINO TRANSFERASE: 19 IU/L (ref 15–46)
BASOPHIL #: 0 10^3/UL (ref 0–0.1)
BASOPHILS %: 0.5 % (ref 0–2)
BILIRUBIN,DIRECT: 0 MG/DL (ref 0–0.2)
BILIRUBIN,TOTAL: 0.3 MG/DL (ref 0.2–1.3)
BLOOD UREA NITROGEN: 23 MG/DL (ref 7–20)
CALCIUM: 11.3 MG/DL (ref 8.4–10.2)
CARBON DIOXIDE: 28 MMOL/L (ref 21–31)
CHLORIDE: 102 MMOL/L (ref 97–110)
CREATININE: 1.07 MG/DL (ref 0.44–1)
EOSINOPHILS #: 0.1 10^3/UL (ref 0–0.5)
EOSINOPHILS %: 1.6 % (ref 0–7)
GLOBULIN: 3.9 G/DL (ref 1.3–3.2)
GLUCOSE: 124 MG/DL (ref 70–220)
HEMATOCRIT: 40.4 % (ref 37–47)
HEMOGLOBIN: 12.9 G/DL (ref 12–16)
IMMATURE GRANS #M: 0.02 10^3/UL (ref 0–0.03)
IMMATURE GRANS % (M): 0.2 % (ref 0–0.43)
LIPASE: 70 U/L (ref 23–300)
LYMPHOCYTES #: 3.2 10^3/UL (ref 0.8–2.9)
LYMPHOCYTES %: 37.2 % (ref 15–51)
MEAN CORPUSCULAR HEMOGLOBIN: 29.9 PG (ref 29–33)
MEAN CORPUSCULAR HGB CONC: 31.9 G/DL (ref 32–37)
MEAN CORPUSCULAR VOLUME: 93.7 FL (ref 82–101)
MEAN PLATELET VOLUME: 9.9 FL (ref 7.4–10.4)
MONOCYTE #: 0.5 10^3/UL (ref 0.3–0.9)
MONOCYTES %: 5.5 % (ref 0–11)
NEUTROPHIL #: 4.7 10^3/UL (ref 1.6–7.5)
NEUTROPHILS %: 55 % (ref 39–77)
NUCLEATED RED BLOOD CELLS #: 0 10^3/UL (ref 0–0)
NUCLEATED RED BLOOD CELLS%: 0 /100WBC (ref 0–0)
PLATELET COUNT: 220 10^3/UL (ref 140–415)
POTASSIUM: 4 MMOL/L (ref 3.5–5.1)
RED BLOOD COUNT: 4.31 10^6/UL (ref 4.2–5.4)
RED CELL DISTRIBUTION WIDTH: 13.5 % (ref 11.5–14.5)
SODIUM: 138 MMOL/L (ref 135–144)
TOTAL PROTEIN: 8 G/DL (ref 6.1–8.1)
TROPONIN-I: < 0.012 NG/ML (ref 0–0.12)
UR ASCORBIC ACID: NEGATIVE MG/DL
UR BACTERIA: (no result) /HPF
UR BILIRUBIN (DIP): NEGATIVE MG/DL
UR BLOOD (DIP): NEGATIVE MG/DL
UR CLARITY: (no result)
UR COLOR: YELLOW
UR GLUCOSE (DIP): NEGATIVE MG/DL
UR KETONES (DIP): NEGATIVE MG/DL
UR LEUKOCYTE ESTERASE (DIP): (no result) LEU/UL
UR MUCUS: (no result) /HPF
UR NITRITE (DIP): NEGATIVE MG/DL
UR PH (DIP): 6 (ref 5–9)
UR RBC: 0 /HPF (ref 0–5)
UR SPECIFIC GRAVITY (DIP): 1.01 (ref 1–1.03)
UR SQUAMOUS EPITHELIAL CELL: (no result) /HPF
UR TOTAL PROTEIN (DIP): NEGATIVE MG/DL
UR UROBILINOGEN (DIP): NEGATIVE MG/DL
UR WBC: 26 /HPF (ref 0–5)
WHITE BLOOD COUNT: 8.6 10^3/UL (ref 4.8–10.8)

## 2019-07-28 PROCEDURE — 74176 CT ABD & PELVIS W/O CONTRAST: CPT

## 2019-07-28 PROCEDURE — 83690 ASSAY OF LIPASE: CPT

## 2019-07-28 PROCEDURE — 81001 URINALYSIS AUTO W/SCOPE: CPT

## 2019-07-28 PROCEDURE — 93005 ELECTROCARDIOGRAM TRACING: CPT

## 2019-07-28 PROCEDURE — 99285 EMERGENCY DEPT VISIT HI MDM: CPT

## 2019-07-28 PROCEDURE — 84484 ASSAY OF TROPONIN QUANT: CPT

## 2019-07-28 PROCEDURE — 80053 COMPREHEN METABOLIC PANEL: CPT

## 2019-07-28 PROCEDURE — 36415 COLL VENOUS BLD VENIPUNCTURE: CPT

## 2019-07-28 PROCEDURE — 85025 COMPLETE CBC W/AUTO DIFF WBC: CPT

## 2019-07-28 RX ADMIN — Medication 1 MG: at 10:39

## 2019-07-28 RX ADMIN — FLUCONAZOLE 1 MG: 200 TABLET ORAL at 10:39

## 2019-07-28 RX ADMIN — NITROFURANTOIN MONOHYDRATE/MACROCRYSTALLINE 1 MG: 25; 75 CAPSULE ORAL at 11:59

## 2019-07-28 RX ADMIN — LACTULOSE 1 GM: 20 SOLUTION ORAL at 10:39

## 2019-08-18 ENCOUNTER — HOSPITAL ENCOUNTER (EMERGENCY)
Dept: HOSPITAL 91 - FTE | Age: 79
Discharge: HOME | End: 2019-08-18
Payer: MEDICARE

## 2019-08-18 ENCOUNTER — HOSPITAL ENCOUNTER (EMERGENCY)
Dept: HOSPITAL 10 - FTE | Age: 79
Discharge: HOME | End: 2019-08-18
Payer: MEDICARE

## 2019-08-18 VITALS — DIASTOLIC BLOOD PRESSURE: 80 MMHG | RESPIRATION RATE: 16 BRPM | SYSTOLIC BLOOD PRESSURE: 196 MMHG | HEART RATE: 81 BPM

## 2019-08-18 VITALS
BODY MASS INDEX: 33.87 KG/M2 | HEIGHT: 64 IN | BODY MASS INDEX: 33.87 KG/M2 | HEIGHT: 64 IN | WEIGHT: 198.42 LBS | WEIGHT: 198.42 LBS
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
## 2019-08-18 DIAGNOSIS — S52.611A: Primary | ICD-10-CM

## 2019-08-18 DIAGNOSIS — Z79.4: ICD-10-CM

## 2019-08-18 DIAGNOSIS — I10: ICD-10-CM
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
## 2019-08-18 PROCEDURE — 93005 ELECTROCARDIOGRAM TRACING: CPT

## 2019-08-18 PROCEDURE — 29125 APPL SHORT ARM SPLINT STATIC: CPT

## 2019-08-18 PROCEDURE — 73080 X-RAY EXAM OF ELBOW: CPT

## 2019-08-18 PROCEDURE — 73030 X-RAY EXAM OF SHOULDER: CPT

## 2019-08-18 PROCEDURE — 73010 X-RAY EXAM OF SHOULDER BLADE: CPT

## 2019-08-18 PROCEDURE — 99284 EMERGENCY DEPT VISIT MOD MDM: CPT

## 2019-08-18 PROCEDURE — 72170 X-RAY EXAM OF PELVIS: CPT

## 2019-08-18 PROCEDURE — 73110 X-RAY EXAM OF WRIST: CPT

## 2019-08-18 RX ADMIN — ACETAMINOPHEN 1 MG: 325 TABLET, FILM COATED ORAL at 14:37

## 2019-08-18 RX ADMIN — LABETALOL HYDROCHLORIDE 1 MG: 100 TABLET, FILM COATED ORAL at 19:28

## 2019-08-18 NOTE — ERD
ER Documentation


Chief Complaint


Chief Complaint





pt lost balance - denies dizziness/ko- c/o R arm pain





ROS


All systems reviewed and are negative except as per history of present illness.





Medications


Home Meds


Active Scripts


Acetaminophen* (Acetaminophen*) 500 MG Extra Strength Tablet, 500 MG PO Q4H PRN 


for PAIN AND OR ELEVATED TEMP, #30 TAB


   Prov:HAIDER PEREA DO         8/18/19


Ibuprofen* (Motrin*) 600 Mg Tab, 600 MG PO Q6H PRN for PAIN AND OR ELEVATED 


TEMP, #30 TAB


   Prov:DONALD HATHAWAY MD         7/28/19


Nitrofurantoin Monohyd Macrocr* (Macrobid*) 100 Mg Capsr, 100 MG PO BID for 7 


Days, CAP


   Prov:DONALD HATHAWAY MD         7/28/19


Reported Medications


Levothyroxine Sodium* (Levoxyl*) 50 Mcg Tablet, 50 MCG PO BEFORE BREAKFAST, #30 


TAB


   6/22/19


Hydrochlorothiazide* (Hydrochlorothiazide*) 25 Mg Tab, 25 MG PO DAILY, #30 TAB


   6/22/19


Tramadol Hcl* (Ultram*) 50 Mg Tablet, 50 MG PO Q6H PRN for PAIN, TAB


   6/22/19


Alprazolam* (Xanax*) 0.5 Mg Tab, 0.5 MG PO Q8H PRN for ANXIETY, TAB


   7/27/18


Insulin Glargine* (Lantus*) 100 Unit/Ml Soln, 25 UNIT SC QHS, #1 VIAL


   7/27/18


Insulin Glargine* (Lantus*) 100 Unit/Ml Soln, 40 UNIT SC QAM, #1 VIAL


   7/27/18


Amlodipine Besylate* (Amlodipine Besylate*) 10 Mg Tablet, 10 MG PO DAILY, #30 


TAB


   7/27/18


Metoprolol Succinate* (Toprol XL*) 100 Mg Tab.sr.24h, 100 MG PO DAILY, #30 TAB


   7/27/18


Lisinopril* (Lisinopril*) 40 Mg Tablet, 40 MG PO DAILY, #30 TAB


   7/27/18


Pantoprazole* (Pantoprazole*) 40 Mg Tablet.dr, 40 MG PO AC BREAKFAST, TAB


   7/27/18


Donepezil* (Donepezil*) 10 Mg Tablet, 10 MG PO DAILY, #30 TAB


   7/27/18





Allergies


Allergies:  


Coded Allergies:  


     No Known Allergies (Verified  Allergy, Mild, 7/28/19)





PMhx/Soc


History of Surgery:  Yes (CATARACT SURGERY BOTH EYE)


Anesthesia Reaction:  No


Hx Neurological Disorder:  No


Hx Respiratory Disorders:  No


Hx Cardiac Disorders:  Yes (HTN, HIGH CHOLESTEROL)


Hx Psychiatric Problems:  No


Hx Miscellaneous Medical Probl:  Yes (ARTHRITIS, GASTRITIS)


Hx Alcohol Use:  No


Hx Substance Use:  No


Hx Tobacco Use:  No


Smoking Status:  Never smoker





Physical Exam


Vitals





Vital Signs


  Date      Temp  Pulse  Resp  B/P (MAP)   Pulse Ox  O2          O2 Flow    FiO2


Time                                                 Delivery    Rate


   8/18/19                         168/81


     13:35                          (110)


   8/18/19  98.6     86    20     191/106        98


     13:09                          (134)





Physical Exam


Const:   No acute distress


Head:   Atraumatic 


Eyes:    Normal Conjunctiva


ENT:    Normal External Ears, Nose and Mouth.


Neck:               Full range of motion. No meningismus.


Resp:   Clear to auscultation bilaterally


Cardio:   Regular rate and rhythm, no murmurs


Abd:    Soft, non tender, non distended. Normal bowel sounds


Skin:   No petechiae or rashes


Back:   No midline or flank tenderness


Ext:    No cyanosis, or edema


Neur:   Awake and alert


Psych:    Normal Mood and Affect


Results 24 hrs





Current Medications


 Medications
   Dose
          Sig/Ac
       Start Time
   Status  Last


 (Trade)       Ordered        Route
 PRN     Stop Time              Admin
Dose


                              Reason                                Admin


                650 mg         ONCE  ONCE
    8/18/19       DC           8/18/19


Acetaminophen                 PO
            14:30
                       14:37




  (Tylenol                                  8/18/19 14:32


Tab)








Departure


Diagnosis:  


   Primary Impression:  


   Right wrist pain


Condition:  Fair


Patient Instructions:  Fracture, Wrist [General]


Referrals:  


CORIN SMITH MD








[facility]


YOU HAVE RECEIVED A MEDICAL SCREENING EXAM AND THE RESULTS INDICATE THAT YOU DO 


NOT HAVE A CONDITION THAT REQUIRES URGENT TREATMENT IN THE EMERGENCY DEPARTMENT.





FURTHER EVALUATION AND TREATMENT OF YOUR CONDITION CAN WAIT UNTIL YOU ARE SEEN 


IN YOUR DOCTORS OFFICE WITHIN THE NEXT 1-2 DAYS. IT IS YOUR RESPONSIBILITY TO 


MAKE AN APPOINTMENT FOR FOLOW-UP CARE.





IF YOU HAVE A PRIMARY DOCTOR


--you should call your primary doctor and schedule an appointment





IF YOU DO NOT HAVE A PRIMARY DOCTOR YOU CAN CALL OUR PHYSICIAN REFERRAL HOTLINE 


AT


 [phone] 





IF YOU CAN NOT AFFORD TO SEE A PHYSICIAN YOU CAN CHOSE FROM THE FOLLOWING 


[facility]





[facility] [phone] [address] [facility] [phone] [facility]. [facility] [phone] [facility] [phone] [facility] [phone] [address] [facility]. [facility] [phone] [address] [facility]. [facility]


Urgent Care





7 a.m.- 11 p.m.


Every Day of the Week





NO APPOINTMENT OR AUTHORIZATION NEEDED


[phone]





Additional Instructions:  


Call your primary care doctor TOMORROW for an appointment during the next 1-2 


days.See the doctor sooner or return here if your condition worsens before your 


appointment time.





Follow up with orthopedic surgery HAIDER Marlow DO                 Aug 18, 2019 18:01

## 2019-08-25 NOTE — CONS
Date/Time of Note


Date/Time of Note


DATE: 11/17/17 


TIME: 10:31





Assessment/Plan


Assessment/Plan


Chief Complaint/Hosp Course


IMPRESSION:


1. Hypertensive urgency/emergency, slowly improving on oral antihypertensives.


2. Possible acute cerebrovascular accident.


3. Diabetes mellitus.


4. Abdominal pain.


5. Arm weakness, left upper extremity.


6. Prerenal ischemia.


7. Mild leukocytosis.


8. Troponin-mildly positive on 3rd draw. No CP. Likely typr 2 infarct in 

setting of HTN emergency





Recc:


-Tele


-serial ecg's


-Continue ACEI and add BB


--Will f/u echo


-continue asa


-pnding neuro eval


Problems:  





Consultation Date/Type/Reason


Admit Date/Time


Nov 16, 2017 at 10:20


Initial Consult Date


11/16/2017


Type of Consultation:  cardiology


Reason for Consultation


HTN


Referring Provider:  CHARANJIT ALANIZ MD





Exam/Review of Systems


Vital Signs


Vitals





 Vital Signs








  Date Time  Temp Pulse Resp B/P Pulse Ox O2 Delivery O2 Flow Rate FiO2


 


11/17/17 08:00  114      


 


11/17/17 07:28 98.0  19 140/75 98   


 


11/17/17 00:00      Nasal Cannula 2.0 














 Intake and Output   


 


 11/16/17 11/16/17 11/17/17





 15:00 23:00 07:00


 


Intake Total  150 ml 


 


Balance  150 ml 











Exam


Review of Systems:


CONSTITUTIONAL:  No fevers, chills.


PULMONARY:  No sob


CARDIOVASCULAR: No chest pain/palpitations


GASTROINTESTINAL:  No nausea/vomiting.


GENITOURINARY:  No hematuria/dysuria.


MUSCULOSKELETAL:  No myagias/arthalgias.


PSYCHIATRIC:  The patient denies depression.


NEUROLOGIC:   No weakness


Constitutional:  alert


Psych:  no complaints


Head:  normocephalic


Neck:  jvd (9 cm water), supple


Respiratory:  diminished breath sounds (at bases/B)


Cardiovascular:  regular rate and rhythm


Gastrointestinal:  non-tender


Musculoskeletal:  muscle tone (normal)


Extremities:  edema (none)


Neurological:  other (No focal deficits)





Results


Result Diagram:  


11/17/17 0616                                                                  

              11/17/17 0616





Results 24 hrs





Laboratory Tests








Test


  11/16/17


17:01 11/16/17


20:51 11/17/17


00:43 11/17/17


01:00


 


Bedside Glucose 141   122    


 


Troponin I   0.117   


 


Urine Color    YELLOW  


 


Urine Clarity    CLOUDY  A


 


Urine pH    5.0  


 


Urine Specific Gravity    1.024  


 


Urine Ketones    NEGATIVE  


 


Urine Nitrite    NEGATIVE  


 


Urine Bilirubin    NEGATIVE  


 


Urine Urobilinogen    1+  H


 


Urine Leukocyte Esterase    2+  H


 


Urine Microscopic RBC    5  


 


Urine Microscopic WBC    108  H


 


Urine Squamous Epithelial


Cells 


  


  


  FEW  


 


 


Urine Bacteria    FEW  A


 


Urine Mucus    FEW  A


 


Urine Hemoglobin    NEGATIVE  


 


Urine Glucose    NEGATIVE  


 


Urine Total Protein    1+  H


 


Urine Opiates Screen    Positive  


 


Urine Barbiturates    Negative  


 


Urine Amphetamines Screen    Negative  


 


Urine Benzodiazepines Screen    Positive  


 


Urine Cocaine Screen    Negative  


 


Urine Cannabinoids    Negative  














Test


  11/17/17


06:16 11/17/17


08:36 


  


 


 


White Blood Count 12.6  H   


 


Red Blood Count 4.53     


 


Hemoglobin 13.5     


 


Hematocrit 41.1     


 


Mean Corpuscular Volume 90.7     


 


Mean Corpuscular Hemoglobin 29.8     


 


Mean Corpuscular Hemoglobin


Concent 32.8  


  


  


  


 


 


Red Cell Distribution Width 14.5     


 


Platelet Count 242     


 


Mean Platelet Volume 10.4     


 


Neutrophils % 79.3  H   


 


Lymphocytes % 15.4     


 


Monocytes % 4.4     


 


Eosinophils % 0.2     


 


Basophils % 0.2     


 


Nucleated Red Blood Cells % 0.0     


 


Neutrophils # 10.0  H   


 


Lymphocytes # 1.9     


 


Monocytes # 0.6     


 


Eosinophils # 0.0     


 


Basophils # 0.0     


 


Nucleated Red Blood Cells # 0.0     


 


Sodium Level 138     


 


Potassium Level 4.6     


 


Chloride Level 103     


 


Carbon Dioxide Level 27     


 


Anion Gap 13     


 


Blood Urea Nitrogen 27  H   


 


Creatinine 0.95     


 


Glucose Level 179     


 


Calcium Level 10.4  H   


 


Troponin I 0.139  *H   


 


Triglycerides Level 76     


 


Cholesterol Level 163     


 


LDL Cholesterol, Calculated 106     


 


HDL Cholesterol 42     


 


Cholesterol/HDL Ratio 3.8     


 


Thyroid Stimulating Hormone


(TSH) 2.020  


  


  


  


 


 


Free Thyroxine 1.24     


 


Bedside Glucose  166    











Medications


Medications





 Current Medications


Hydralazine HCl (Apresoline) 10 mg Q6H  PRN IV SBP>170 Last administered on 11/ 17/17at 03:40; Admin Dose 10 MG;  Start 11/16/17 at 15:30


Insulin Glargine (Lantus) 21 unit DAILY@20 SC  Last administered on 11/16/17at 

20:58; Admin Dose 21 UNIT;  Start 11/16/17 at 20:00


Diagnostic Test (Pha) (Accu-Chek) 1 ea 02 XX ;  Start 11/17/17 at 02:00


Donepezil HCl (Aricept) 10 mg DAILY PO  Last administered on 11/17/17at 09:36; 

Admin Dose 10 MG;  Start 11/17/17 at 09:00


Lisinopril (Zestril) 40 mg DAILY PO  Last administered on 11/17/17at 09:36; 

Admin Dose 40 MG;  Start 11/17/17 at 09:00


Ranitidine HCl (Zantac) 300 mg HS PO  Last administered on 11/16/17at 20:52; 

Admin Dose 300 MG;  Start 11/16/17 at 21:00


Tramadol HCl (Ultram) 50 mg Q6H  PRN PO PAIN;  Start 11/16/17 at 16:00


Ondansetron HCl (Zofran Inj) 4 mg Q6H  PRN IV NAUSEA AND/OR VOMITING;  Start 11/ 16/17 at 16:00


Aspirin (Aspirin) 81 mg DAILY PO  Last administered on 11/17/17at 09:36; Admin 

Dose 81 MG;  Start 11/17/17 at 09:00


Acetaminophen (Tylenol Tab) 650 mg Q6H  PRN PO PAIN LEVEL 1-3 OR FEVER;  Start 

11/16/17 at 16:00


Morphine Sulfate (morphine) 2 mg Q4H  PRN IV PAIN LEVEL 7-10;  Start 11/16/17 

at 16:00


Enoxaparin Sodium (Lovenox) 30 mg DAILY SC  Last administered on 11/17/17at 09:

43; Admin Dose 30 MG;  Start 11/17/17 at 09:00


Miscellaneous Information 1 ea NOTE XX ;  Start 11/16/17 at 16:00


Glucose (Glutose) 15 gm Q15M  PRN PO DECREASED GLUCOSE;  Start 11/16/17 at 16:00


Glucose (Glutose) 22.5 gm Q15M  PRN PO DECREASED GLUCOSE;  Start 11/16/17 at 16:

00


Dextrose (D50w Syringe) 25 ml Q15M  PRN IV DECREASED GLUCOSE;  Start 11/16/17 

at 16:00


Dextrose (D50w Syringe) 50 ml Q15M  PRN IV DECREASED GLUCOSE;  Start 11/16/17 

at 16:00


Glucagon (Glucagen) 1 mg Q15M  PRN IM DECREASED GLUCOSE;  Start 11/16/17 at 16:

00


Glucose (Glutose) 15 gm Q15M  PRN BUCCAL DECREASED GLUCOSE;  Start 11/16/17 at 

16:00











ZULEIKA DUENAS 17, 2017 10:34 - - -

## 2019-09-07 ENCOUNTER — HOSPITAL ENCOUNTER (EMERGENCY)
Dept: HOSPITAL 91 - E/R | Age: 79
Discharge: HOME | End: 2019-09-07
Payer: MEDICARE

## 2019-09-07 ENCOUNTER — HOSPITAL ENCOUNTER (EMERGENCY)
Dept: HOSPITAL 10 - E/R | Age: 79
Discharge: HOME | End: 2019-09-07
Payer: MEDICARE

## 2019-09-07 VITALS
BODY MASS INDEX: 40.57 KG/M2 | WEIGHT: 220.46 LBS | HEIGHT: 62 IN | BODY MASS INDEX: 40.57 KG/M2 | WEIGHT: 220.46 LBS | HEIGHT: 62 IN

## 2019-09-07 VITALS — RESPIRATION RATE: 20 BRPM | HEART RATE: 81 BPM | SYSTOLIC BLOOD PRESSURE: 153 MMHG | DIASTOLIC BLOOD PRESSURE: 72 MMHG

## 2019-09-07 DIAGNOSIS — N30.90: Primary | ICD-10-CM

## 2019-09-07 DIAGNOSIS — Z79.4: ICD-10-CM

## 2019-09-07 DIAGNOSIS — I10: ICD-10-CM

## 2019-09-07 DIAGNOSIS — E11.9: ICD-10-CM

## 2019-09-07 LAB
URINE LEUKOCYTE EST (DIP) POC: (no result)
URINE PH (DIP) POC: 7 (ref 5–8.5)

## 2019-09-07 PROCEDURE — 87086 URINE CULTURE/COLONY COUNT: CPT

## 2019-09-07 PROCEDURE — 81003 URINALYSIS AUTO W/O SCOPE: CPT

## 2019-09-07 PROCEDURE — 99283 EMERGENCY DEPT VISIT LOW MDM: CPT

## 2019-09-07 RX ADMIN — CEPHALEXIN 1 MG: 500 CAPSULE ORAL at 15:29

## 2019-09-07 RX ADMIN — PHENAZOPYRIDINE HYDROCHLORIDE 1 MG: 100 TABLET ORAL at 15:29

## 2025-05-06 NOTE — ERD
ER Documentation


Chief Complaint


Chief Complaint





lt side abd pain radiating to back





HPI


Patient is an 78-year-old female with a history of hypertension, diabetes, and 


UTI who presents with abdominal pain.  Please note that a  


was used for the entire history and physical exam.  The patient has abdominal 


pain which radiates to her left side.  She has bilateral legs that feel like 


they are falling asleep.  She notes white discharge from her vagina as well.  


Upon review of old medical record the patient has multiple visits and symptoms 


for months and she feels like this is the same as before.  She has no fevers.  


She has nausea but no vomiting.  She saw her primary doctor on Thursday.  She 


has no diarrhea.  She does have a primary doctor.





ROS


All systems reviewed and are negative except as per history of present illness.





Medications


Home Meds


Active Scripts


Ibuprofen* (Motrin*) 600 Mg Tab, 600 MG PO Q6H PRN for PAIN AND OR ELEVATED 


TEMP, #30 TAB


   Prov:DONALD HATHAWAY MD         7/28/19


Nitrofurantoin Monohyd Macrocr* (Macrobid*) 100 Mg Capsr, 100 MG PO BID for 7 


Days, CAP


   Prov:DONALD HATHAWAY MD         7/28/19


Reported Medications


Levothyroxine Sodium* (Levoxyl*) 50 Mcg Tablet, 50 MCG PO BEFORE BREAKFAST, #30 


TAB


   6/22/19


Hydrochlorothiazide* (Hydrochlorothiazide*) 25 Mg Tab, 25 MG PO DAILY, #30 TAB


   6/22/19


Tramadol Hcl* (Ultram*) 50 Mg Tablet, 50 MG PO Q6H PRN for PAIN, TAB


   6/22/19


Alprazolam* (Xanax*) 0.5 Mg Tab, 0.5 MG PO Q8H PRN for ANXIETY, TAB


   7/27/18


Insulin Glargine* (Lantus*) 100 Unit/Ml Soln, 25 UNIT SC QHS, #1 VIAL


   7/27/18


Insulin Glargine* (Lantus*) 100 Unit/Ml Soln, 40 UNIT SC QAM, #1 VIAL


   7/27/18


Amlodipine Besylate* (Amlodipine Besylate*) 10 Mg Tablet, 10 MG PO DAILY, #30 


TAB


   7/27/18


Metoprolol Succinate* (Toprol XL*) 100 Mg Tab.sr.24h, 100 MG PO DAILY, #30 TAB


   7/27/18


Lisinopril* (Lisinopril*) 40 Mg Tablet, 40 MG PO DAILY, #30 TAB


   7/27/18


Pantoprazole* (Pantoprazole*) 40 Mg Tablet.dr, 40 MG PO AC BREAKFAST, TAB


   7/27/18


Donepezil* (Donepezil*) 10 Mg Tablet, 10 MG PO DAILY, #30 TAB


   7/27/18


Discontinued Scripts


Cephalexin* (Keflex*) 500 Mg Capsule, 500 MG PO BID for 7 Days, CAP


   Prov:ESPERANZA LEMA MD         6/22/19





Allergies


Allergies:  


Coded Allergies:  


     No Known Allergies (Verified  Allergy, Mild, 7/28/19)





PMhx/Soc


History of Surgery:  Yes (CATARACT SURGERY BOTH EYE)


Anesthesia Reaction:  No


Hx Neurological Disorder:  No


Hx Respiratory Disorders:  No


Hx Cardiac Disorders:  Yes (HTN, HIGH CHOLESTEROL)


Hx Psychiatric Problems:  No


Hx Miscellaneous Medical Probl:  Yes (ARTHRITIS, GASTRITIS)


Hx Alcohol Use:  No


Hx Substance Use:  No


Hx Tobacco Use:  No


Smoking Status:  Never smoker





FmHx


Family History:  diabetes





Physical Exam


Vitals





Vital Signs


  Date      Temp  Pulse  Resp  B/P (MAP)   Pulse Ox  O2          O2 Flow    FiO2


Time                                                 Delivery    Rate


   7/28/19  98.0     67    18      136/47        96  Room Air


     12:38                           (76)


   7/28/19  97.8     73    18      122/66        98


     09:55                           (84)





Physical Exam


Const:   No acute distress


Head:   Atraumatic 


Eyes:    Normal Conjunctiva


ENT:    Normal External Ears, Nose and Mouth.


Neck:               Full range of motion. No meningismus.


Resp:   Clear to auscultation bilaterally


Cardio:   Regular rate and rhythm, no murmurs


Abd:    Soft, left upper quadrant tenderness to palpation without rebound or 


guarding


Skin:   No petechiae or rashes


Back:   No midline or flank tenderness


Ext:    No cyanosis, or edema


Neur:   Awake and alert


Psych:    Normal Mood and Affect


Result Diagram:  


7/28/19 1027                                                                    


           7/28/19 1027





Results 24 hrs





Laboratory Tests


      Test
                                 7/28/19
10:27    7/28/19
10:30


      White Blood Count                      8.6 10^3/ul


      Red Blood Count                       4.31 10^6/ul


      Hemoglobin                               12.9 g/dl


      Hematocrit                                  40.4 %


      Mean Corpuscular Volume                    93.7 fl


      Mean Corpuscular Hemoglobin                29.9 pg


      Mean Corpuscular Hemoglobin
Concent     31.9 g/dl 
  



      Red Cell Distribution Width                 13.5 %


      Platelet Count                         220 10^3/UL


      Mean Platelet Volume                        9.9 fl


      Immature Granulocytes %                    0.200 %


      Neutrophils %                               55.0 %


      Lymphocytes %                               37.2 %


      Monocytes %                                  5.5 %


      Eosinophils %                                1.6 %


      Basophils %                                  0.5 %


      Nucleated Red Blood Cells %            0.0 /100WBC


      Immature Granulocytes #              0.020 10^3/ul


      Neutrophils #                          4.7 10^3/ul


      Lymphocytes #                          3.2 10^3/ul


      Monocytes #                            0.5 10^3/ul


      Eosinophils #                          0.1 10^3/ul


      Basophils #                            0.0 10^3/ul


      Nucleated Red Blood Cells #            0.0 10^3/ul


      Sodium Level                            138 mmol/L


      Potassium Level                         4.0 mmol/L


      Chloride Level                          102 mmol/L


      Carbon Dioxide Level                     28 mmol/L


      Anion Gap                                        8


      Blood Urea Nitrogen                       23 mg/dl


      Creatinine                              1.07 mg/dl


      Est Glomerular Filtrat Rate
mL/min    mL/min 
       



      Glucose Level                            124 mg/dl


      Calcium Level                           11.3 mg/dl


      Total Bilirubin                          0.3 mg/dl


      Direct Bilirubin                        0.00 mg/dl


      Indirect Bilirubin                       0.3 mg/dl


      Aspartate Amino Transf
(AST/SGOT)         19 IU/L 
  



      Alanine Aminotransferase
(ALT/SGPT)       15 IU/L 
  



      Alkaline Phosphatase                      149 IU/L


      Troponin I                           < 0.012 ng/ml


      Total Protein                             8.0 g/dl


      Albumin                                   4.1 g/dl


      Globulin                                 3.90 g/dl


      Albumin/Globulin Ratio                        1.05


      Lipase                                      70 U/L


      Urine Color                                          YELLOW


      Urine Clarity
                       
               SLIGHTLY
CLOUDY


      Urine pH                                                        6.0


      Urine Specific Gravity                                        1.009


      Urine Ketones                                        NEGATIVE mg/dL


      Urine Nitrite                                        NEGATIVE mg/dL


      Urine Bilirubin                                      NEGATIVE mg/dL


      Urine Urobilinogen                                   NEGATIVE mg/dL


      Urine Leukocyte Esterase                                  1+ Johnnie/ul


      Urine Microscopic RBC                                        0 /HPF


      Urine Microscopic WBC                                       26 /HPF


      Urine Squamous Epithelial
Cells      
               FEW /HPF 



      Urine Bacteria                                       FEW /HPF


      Urine Mucus                                          FEW /HPF


      Urine Hemoglobin                                     NEGATIVE mg/dL


      Urine Glucose                                        NEGATIVE mg/dL


      Urine Total Protein                                  NEGATIVE mg/dl





Current Medications


 Medications
   Dose
          Sig/Ac
       Start Time
   Status  Last


 (Trade)       Ordered        Route
 PRN     Stop Time              Admin
Dose


                              Reason                                Admin


 Bisacodyl
     10 mg          ONCE  ONCE
    7/28/19       DC           7/28/19


(Dulcolax                     MO
            10:30
                       10:39



Supp)                                        7/28/19 10:31


 Lactulose
     20 gm          ONCE  ONCE
    7/28/19       DC           7/28/19


(Enulose)                     PO
            10:30
                       10:39



                                             7/28/19 10:31


 Fluconazole
   200 mg         ONCE  ONCE
    7/28/19       DC           7/28/19


 (Diflucan)                   PO
            10:30
                       10:39



                                             7/28/19 10:31


                100 mg         ONCE  ONCE
    7/28/19       DC           7/28/19


Nitrofurantoi                 PO
            11:30
                       11:59



n
                                           7/28/19 11:44


Macrocrystals



  (Macrobid)








Procedures/MDM


EKG read by me: 


Rate/Rhythm: Regular rate and rhythm at a rate of 76


Intervals:    Normal


Impression:     No evidence of ischemia or arrhythmia





CT abdomen pelvis read by radiology.





Patient is an 78-year-old female who presents with abdominal pain.  The patient 


has left-sided upper abdominal pain which radiates to her flank.  The patient is


otherwise well-appearing.  Laboratory studies were done as well as a CT scan of 


the abdomen and pelvis.  CT scan shows no surgical process.  The patient was 


found to have acute cystitis.  I doubt pyonephritis or sepsis.  I doubt 


appendicitis, cholecystitis, pancreatitis, or bowel obstruction.  I believe 


outpatient management is appropriate.  However the patient will need close 


follow-up with her primary doctor within 24 to 48 hours.  She will be given a 


prescription for Macrobid.  She can return for any worsening symptoms.





Departure


Diagnosis:  


   Primary Impression:  


   Cystitis


   Additional Impression:  


   Abdominal pain


   Abdominal location:  left upper quadrant  Qualified Codes:  R10.12 - Left 


   upper quadrant pain


Condition:  Fair


Patient Instructions:  Abdominal Pain, Cystitis





Additional Instructions:  


Llame al doctor MAANA y ryan lynette AGUSTIN PARA DENTRO DE 1-2 CRUZ.Dgale a la 


secretaria que nosotros le instruimos hacer esta agustin.Avise o llame si garcia 


condicin se empeora antes de la agustin. Regresa aqui si peor o no mejor.











DONALD HATHAWAY MD                Jul 28, 2019 12:50 27.9